# Patient Record
Sex: FEMALE | Race: WHITE | Employment: OTHER | URBAN - METROPOLITAN AREA
[De-identification: names, ages, dates, MRNs, and addresses within clinical notes are randomized per-mention and may not be internally consistent; named-entity substitution may affect disease eponyms.]

---

## 2017-02-07 ENCOUNTER — TELEPHONE (OUTPATIENT)
Dept: NEUROLOGY | Age: 67
End: 2017-02-07

## 2017-02-07 DIAGNOSIS — G35 MULTIPLE SCLEROSIS (HCC): Primary | ICD-10-CM

## 2017-02-07 RX ORDER — DIMETHYL FUMARATE 240 MG/1
240 CAPSULE ORAL 2 TIMES DAILY
Qty: 60 CAP | Refills: 0 | Status: SHIPPED | OUTPATIENT
Start: 2017-02-07 | End: 2017-02-28 | Stop reason: SDUPTHER

## 2017-02-07 NOTE — TELEPHONE ENCOUNTER
Patient has follow up 2/28  Please review script and sign if approved      Fax to [address] [phone]

## 2017-02-07 NOTE — TELEPHONE ENCOUNTER
Pt will need to get labs checked before her visit. Placed those orders. Please print and mail to patient or she can  from office.   Will authorize 1 month Rx Tecfidera

## 2017-02-08 ENCOUNTER — TELEPHONE (OUTPATIENT)
Dept: NEUROLOGY | Age: 67
End: 2017-02-08

## 2017-02-08 NOTE — TELEPHONE ENCOUNTER
Called pharmacy gave refill  Called patient will send labs via mail  Someone will call to get mri scheduled   Patient states understanding

## 2017-02-08 NOTE — TELEPHONE ENCOUNTER
Patient wants to know if she can get her MRI at [facility] since she is closer to there

## 2017-02-21 ENCOUNTER — TELEPHONE (OUTPATIENT)
Dept: NEUROLOGY | Age: 67
End: 2017-02-21

## 2017-02-21 NOTE — TELEPHONE ENCOUNTER
Refaxed MRI order to [address] [place] [address] Coordinator with demographics attached.  Contacted patient and made her aware order was resent, also provided her with the service coordinators phone # [phone]

## 2017-02-28 ENCOUNTER — OFFICE VISIT (OUTPATIENT)
Dept: NEUROLOGY | Age: 67
End: 2017-02-28

## 2017-02-28 ENCOUNTER — DOCUMENTATION ONLY (OUTPATIENT)
Dept: NEUROLOGY | Age: 67
End: 2017-02-28

## 2017-02-28 VITALS
WEIGHT: 144 LBS | BODY MASS INDEX: 27.19 KG/M2 | HEIGHT: 61 IN | OXYGEN SATURATION: 98 % | DIASTOLIC BLOOD PRESSURE: 70 MMHG | HEART RATE: 62 BPM | SYSTOLIC BLOOD PRESSURE: 128 MMHG

## 2017-02-28 DIAGNOSIS — G35 MULTIPLE SCLEROSIS (HCC): Primary | ICD-10-CM

## 2017-02-28 DIAGNOSIS — R20.0 NUMBNESS IN BOTH LEGS: ICD-10-CM

## 2017-02-28 DIAGNOSIS — R25.2 MUSCLE CRAMPING: ICD-10-CM

## 2017-02-28 RX ORDER — BUSPIRONE HYDROCHLORIDE 5 MG/1
TABLET ORAL
Refills: 0 | COMMUNITY
Start: 2016-12-26 | End: 2018-04-10

## 2017-02-28 RX ORDER — LOPERAMIDE HYDROCHLORIDE 2 MG/1
CAPSULE ORAL
Refills: 0 | COMMUNITY
Start: 2016-12-26

## 2017-02-28 RX ORDER — DIMETHYL FUMARATE 240 MG/1
240 CAPSULE ORAL 2 TIMES DAILY
Qty: 60 CAP | Refills: 6 | Status: SHIPPED | OUTPATIENT
Start: 2017-02-28 | End: 2017-10-31 | Stop reason: SDUPTHER

## 2017-02-28 RX ORDER — HYDROCHLOROTHIAZIDE 12.5 MG/1
CAPSULE ORAL
Refills: 5 | COMMUNITY
Start: 2017-02-10 | End: 2020-12-22 | Stop reason: SDUPTHER

## 2017-02-28 RX ORDER — MONTELUKAST SODIUM 10 MG/1
TABLET ORAL
Refills: 3 | COMMUNITY
Start: 2017-02-19 | End: 2020-12-22 | Stop reason: SDUPTHER

## 2017-02-28 RX ORDER — POTASSIUM CHLORIDE 20 MEQ/1
TABLET, EXTENDED RELEASE ORAL
Refills: 0 | COMMUNITY
Start: 2017-02-10 | End: 2018-11-20

## 2017-02-28 RX ORDER — BACLOFEN 10 MG/1
TABLET ORAL
Qty: 40 TAB | Refills: 2 | Status: SHIPPED | OUTPATIENT
Start: 2017-02-28 | End: 2017-03-31 | Stop reason: SDUPTHER

## 2017-02-28 NOTE — PROGRESS NOTES
Interval HPI: This is a 61 y.o. female who is following up for MS    Chief Complaint   Patient presents with    Multiple Sclerosis     having leg cramps in thighs 2-3 times a week     Last visit: August 2016    Describes having frequent muscle cramping in left anterior thigh, can happen either when sitting or walking, seems worse at night, waking her up out of sleep. She recalls that Baclofen helped her calf cramps and lower back spasms in the past.  Otherwise, no interval episodes of vision loss, eye pain, new extremity weakness or numbness, or worsening of gait. Had MRI Brain (+/- contrast) done at [facility] few days ago, but no report or CD available today to review. Also reports having her CBC, CMP done at [facility] about 1 month ago. Continues taking Tecfidera 240 mg BID. Had MRI Brain in March 2016 (+/- contrast), no change in supratentorial lesion burden. Separately, patient asking to discontinue Keppra as she has not had her staring spells in at least 6 years and the episodes she had in the past (\"mini-seizures, spacing-out episodes) were never proven to be true seizures (multiple routine EEGs have been normal). She reports that she was off her keppra for months recently and didn't have any recurrence of staring episodes. Chronic MS symptoms:   gait unsteadiness/ falls  bladder urgency  bowel urgency   spasticity (treated in past with baclofen)  \"mini-seizure\" (episodes where she \"spaces out\", EEG in past normal)    MS Flare Hx:  : Pseudo-exacerbation due to UTI    Labs:  6-25-16: CMP (normal Cr 0.85, Na 141)  7-1-16: WBC 5.2k, abs Lymphocyte 1.4k, % lymphocyte 27.8 (all within normal range)      =====================================  Brief Hx:   61 y.o. female with MS, Hep C, dx with MS around 2530-2725. Initial symptoms was numbness in legs, unsteady gait, speech difficulty.  Recalls having MRI Brain and LP that were abnormal. Tried Rebif, Avonex, Copaxone, and Betaseron in the past, but she's not sure why they were discontinued. She denies having any side effects to any of the injections, she's not sure if they were changed because of disease progression or relapses. She recalls being hospitalized in March of 2014 for an MS relapse (confirmed by MRI Brain, and ? C-spine) and getting a course of IV solumedrol. The last MS med she was started on was Tecfidera and says no problems with that. Prior testing  EEG: normal awake/ drowsy  MRI Brain 3-2016: no significant changes from prior MRI Brain  MRI Brain 5-13-15: moderate white matter lesion burden consistent with Multiple Sclerosis  MRI C-spine 5-13-15: no cord lesions; moderate stenosis at C5-6  MRI T-spine done in 11/ 2013 (outside facility; see media section) didn't show any spinal stenosis or cord lesions  NMO antibodies were negative. Brief ROS: as above  There have been no significant changes in PMHx, PSHx, SHx except as noted above. Allergies   Allergen Reactions    Iodinated Contrast Media - Oral And Iv Dye Anaphylaxis     Current Outpatient Prescriptions   Medication Sig Dispense Refill    busPIRone (BUSPAR) 5 mg tablet TK 1 T PO TID  0    montelukast (SINGULAIR) 10 mg tablet TK 1 T PO  D  3    hydroCHLOROthiazide (MICROZIDE) 12.5 mg capsule TK 1 C PO D  5    loperamide (IMODIUM) 2 mg capsule TK ONE C PO  BID PRN  0    potassium chloride (K-DUR, KLOR-CON) 20 mEq tablet TK 1 T PO  DAILY PRN. TAKE WITH LASIX  0    dimethyl fumarate (TECFIDERA) 240 mg cpDR Take 240 mg by mouth two (2) times a day. 60 Cap 6    baclofen (LIORESAL) 10 mg tablet Can take one tab up to 2 times a day if needed for muscle cramping. May cause fatigue or general muscle weakness. 40 Tab 2    omeprazole (PRILOSEC) 20 mg capsule Take 20 mg by mouth daily.  albuterol (PROAIR HFA) 90 mcg/actuation inhaler Take  by inhalation.       fluticasone-salmeterol (ADVAIR DISKUS) 250-50 mcg/dose diskus inhaler Take 1 Puff by inhalation every twelve (12) hours.  conjugated estrogens (PREMARIN) 0.625 mg tablet Take  by mouth.  metoprolol succinate (TOPROL-XL) 50 mg XL tablet Take  by mouth daily.  amLODIPine (NORVASC) 5 mg tablet Take 5 mg by mouth daily. Physical Exam  Blood pressure 128/70, pulse 62, height 5' 1\" (1.549 m), weight 65.3 kg (144 lb), SpO2 98 %. No acute distress  Neck: no stiffness  CV: regular heart rate  Lungs: clear    Focused Neurological Exam     Mental status: Alert and oriented to person, place situation. Language: normal fluency and comprehension; no dysarthria. CNs:   Visual fields grossly normal  Extraocular movements intact, no nystagmus  Face appears symmetric and facial strength normal.    Hearing is intact to casual conversation. Sensory:  Reduced pinprick in both feet and up slightly above both ankles  Intact temperature in both feet and legs    Motor:   Upper extremities: 5/ 5 bilaterally  Right lower ext: 5/ 5  Left lower ext: 5/ 5     Reflexes:  2+ patellars    Gait: stands independently, ambulates with slight gait unsteadiness    Impression    ICD-10-CM ICD-9-CM    1. Multiple sclerosis (HCC) G35 340    2. Numbness in both legs R20.0 782. 0 EMG LIMITED      NCV SENSORY OR MIXED   3. Muscle cramping R25.2 729.82 baclofen (LIORESAL) 10 mg tablet        61 y.o. yo female with MS, Hepatitis C (seeing GI/ hepatology)    Pt signed BELINDA so I can get most recent MRI Brain report and most recent labs. She will also drop off the MRI CD so I can review the images. Renewed Rx for Tecfidera 240 mg BID. Check NCV both legs, EMG left leg for c/o left leg cramping and finding of sensory loss in both lower legs. Rx'd Baclofen 10 mg up to twice a day prn for muscle cramping (back, calves, and left thigh). Common SEFx discussed. Hx of staring, unresponsive episodes in the remote past, and EEGs have been normal (done at times outside of her spells).   Patient wishes to stop her anti-seizure medication since it's been more than 5 years since a spell. We discussed the pros/ cons of doing so but because she hasn't had a spell in so many years and it's not clear they are/ were true seizures to begin with, she is comfortable with the decision to stop Keppra. Advised her to only take 1 tab at bedtime x 2 weeks, then to stop taking it. She understands that if she has a seizure then she would not be able to drive for 6 month from that point.       Follow up in 6 months

## 2017-02-28 NOTE — MR AVS SNAPSHOT
Visit Information Date & Time Provider Department Dept. Phone Encounter #  
 2/28/2017  9:40 AM Maddison Fleming MD [address] [place] [address] Neurology Clinic [phone] [id number] Follow-up Instructions Return in about 6 months (around 8/28/2017). Upcoming Health Maintenance Date Due Hepatitis C Screening 1950 DTaP/Tdap/Td series (1 - Tdap) 9/19/1973 PAP AKA CERVICAL CYTOLOGY 9/19/1973 BREAST CANCER SCRN MAMMOGRAM 9/19/2002 FOBT Q 1 YEAR AGE 50-75 9/19/2002 ZOSTER VACCINE AGE 60> 9/19/2012 INFLUENZA AGE 9 TO ADULT 8/1/2016 Allergies as of 2/28/2017  Review Complete On: 2/28/2017 By: Gary Mensah LPN Severity Noted Reaction Type Reactions Iodinated Contrast Media - Oral And Iv Dye High 04/23/2015    Anaphylaxis Current Immunizations  Never Reviewed No immunizations on file. Not reviewed this visit You Were Diagnosed With   
  
 Codes Comments Multiple sclerosis ([facility]    -  Primary ICD-10-CM: G35 
ICD-9-CM: 497 Numbness in both legs     ICD-10-CM: R20.0 ICD-9-CM: 782.0 Muscle cramping     ICD-10-CM: R25.2 ICD-9-CM: 729.82 Vitals BP  
  
  
  
  
  
 128/70 (BP 1 Location: Left arm, BP Patient Position: Sitting) BMI and BSA Data Body Mass Index Body Surface Area  
 27.21 kg/m 2 1.68 m 2 Preferred Pharmacy Pharmacy Name Phone [facility] [address] ([phone] Your Updated Medication List  
  
   
This list is accurate as of: 2/28/17 10:10 AM.  Always use your most recent med list.  
  
  
  
  
 [facility] [place] [facility] 250-50 mcg/dose diskus inhaler Generic drug:  fluticasone-salmeterol Take 1 Puff by inhalation every twelve (12) hours. amLODIPine 5 mg tablet Commonly known as:  Jone Alstrom Take 5 mg by mouth daily. baclofen 10 mg tablet Commonly known as:  LIORESAL  
 Can take one tab up to 2 times a day if needed for muscle cramping. May cause fatigue or general muscle weakness. busPIRone 5 mg tablet Commonly known as:  BUSPAR TK 1 T PO TID  
  
 dimethyl fumarate 240 mg Cpdr  
Commonly known as:  Sade Flicker Take 240 mg by mouth two (2) times a day. hydroCHLOROthiazide 12.5 mg capsule Commonly known as:  [place] TK 1 C PO D  
  
 loperamide 2 mg capsule Commonly known as:  IMODIUM TK ONE C PO  BID PRN  
  
 metoprolol succinate 50 mg XL tablet Commonly known as:  TOPROL-XL Take  by mouth daily. montelukast 10 mg tablet Commonly known as:  SINGULAIR TK 1 T PO  D  
  
 omeprazole 20 mg capsule Commonly known as:  PRILOSEC Take 20 mg by mouth daily. potassium chloride 20 mEq tablet Commonly known as:  K-DUR, KLOR-CON TK 1 T PO  DAILY PRN. TAKE WITH LASIX PREMARIN 0.625 mg tablet Generic drug:  conjugated estrogens Take  by mouth. PROAIR HFA 90 mcg/actuation inhaler Generic drug:  albuterol Take  by inhalation. Prescriptions Printed Refills  
 dimethyl fumarate (TECFIDERA) 240 mg cpDR 6 Sig: Take 240 mg by mouth two (2) times a day. Class: Print Route: Oral  
  
Prescriptions Sent to Pharmacy Refills  
 baclofen (LIORESAL) 10 mg tablet 2 Sig: Can take one tab up to 2 times a day if needed for muscle cramping. May cause fatigue or general muscle weakness. Class: Normal  
 Pharmacy: [facility] [address] #: [phone] Follow-up Instructions Return in about 6 months (around 8/28/2017). To-Do List   
 02/28/2017 Neurology:  EMG LIMITED   
  
 02/28/2017 Neurology:  NCV SENSORY OR MIXED Patient Instructions Take Keppra 500 mg at night for 2 weeks, then stop taking it.  
 
Please drop off MRI Brain CD 
 
 Can take Baclofen 10 mg up to twice a day for the thigh cramping Will check nerve test of both legs regarding numbness in feet and cramping in left leg We will get your last MRI Brain report and labwork from [facility] See you back in 6 months Introducing [facility] [place] [facility]! Joann Fu introduces [employer] patient portal. Now you can access parts of your medical record, email your doctor's office, and request medication refills online. 1. In your internet browser, go to [email or web address] 2. Click on the First Time User? Click Here link in the Sign In box. You will see the New Member Sign Up page. 3. Enter your [employer] Access Code exactly as it appears below. You will not need to use this code after youve completed the sign-up process. If you do not sign up before the expiration date, you must request a new code. · [employer] Access Code: [email or web address] Expires: 5/9/2017 10:48 AM 
 
4. Enter the last four digits of your Social Security Number (xxxx) and Date of Birth (mm/dd/yyyy) as indicated and click Submit. You will be taken to the next sign-up page. 5. Create a [employer] ID. This will be your [employer] login ID and cannot be changed, so think of one that is secure and easy to remember. 6. Create a [employer] password. You can change your password at any time. 7. Enter your Password Reset Question and Answer. This can be used at a later time if you forget your password. 8. Enter your e-mail address. You will receive e-mail notification when new information is available in [address] 9. Click Sign Up. You can now view and download portions of your medical record. 10. Click the Download Summary menu link to download a portable copy of your medical information. If you have questions, please visit the Frequently Asked Questions section of the [employer] website. Remember, [employer] is NOT to be used for urgent needs. For medical emergencies, dial 911. Now available from your iPhone and Android! Please provide this summary of care documentation to your next provider. Your primary care clinician is listed as Stephie ARRINGTON. If you have any questions after today's visit, please call [phone].

## 2017-02-28 NOTE — PATIENT INSTRUCTIONS
Take Keppra 500 mg at night for 2 weeks, then stop taking it.     Please drop off MRI Brain CD    Can take Baclofen 10 mg up to twice a day for the thigh cramping    Will check nerve test of both legs regarding numbness in feet and cramping in left leg    We will get your last MRI Brain report and labwork from [address]    See you back in 6 months

## 2017-02-28 NOTE — PROGRESS NOTES
Faxed release of medical records to [facility] records department to have them send MRI report from [facility].

## 2017-03-08 ENCOUNTER — TELEPHONE (OUTPATIENT)
Dept: NEUROLOGY | Age: 67
End: 2017-03-08

## 2017-03-08 NOTE — TELEPHONE ENCOUNTER
Aurora [address] back and spoke with pharmacist Amirah Orr. Provided verbal order for patients tecfidera.

## 2017-03-15 DIAGNOSIS — G35 MULTIPLE SCLEROSIS (HCC): ICD-10-CM

## 2017-03-20 ENCOUNTER — OFFICE VISIT (OUTPATIENT)
Dept: NEUROLOGY | Age: 67
End: 2017-03-20

## 2017-03-20 DIAGNOSIS — M54.16 CHRONIC LEFT LUMBAR RADICULOPATHY: Primary | ICD-10-CM

## 2017-03-20 NOTE — PROCEDURES
EMG/ NCS Report  [facility]  [address]  Ph. [phone]  Test Date:  3/20/2017    Patient: Niurka Pettit : 1950 Physician: Amirah Abdullahi M.D.   ID#: [id number] SEX: Female Ref. Phys: Amirah Abdullahi M.D. Patient History  CC: left lateral leg numbness/ pain, tingling in feet    EMG & NCV Findings:  Evaluation of the Left peroneal motor nerve showed normal distal onset latency (3.9 ms), reduced amplitude (1.5 mV), decreased conduction velocity (B Fib-Ankle, 39 m/s), and normal conduction velocity (Poplt-B Fib, 42 m/s). The Right peroneal motor nerve showed normal distal onset latency (3.9 ms), reduced amplitude (1.1 mV), normal conduction velocity (B Fib-Ankle, 43 m/s), and decreased conduction velocity (Poplt-B Fib, 34 m/s). The Left peroneal (TA) motor and the Right peroneal (TA) motor nerves showed normal distal onset latency (L3.4, R3.4 ms) and normal amplitude (L3.7, R4.3 mV). The Left tibial motor nerve showed normal distal onset latency (4.1 ms), normal amplitude (4.4 mV), and normal conduction velocity (Knee-Ankle, 40 m/s). The Right tibial motor nerve showed normal distal onset latency (3.7 ms), normal amplitude (4.8 mV), and decreased conduction velocity (Knee-Ankle, 38 m/s). The Left median sensory nerve showed normal distal peak latency (3.5 ms), normal amplitude (35.7 µV), and decreased conduction velocity (Wrist-2nd Digit, 46 m/s). The Left radial sensory nerve showed normal distal peak latency (2.3 ms), normal amplitude (11.6 µV), and normal conduction velocity [facility] 1st Digit, 53 m/s). The Left superficial peroneal sensory nerve showed normal distal peak latency (3.7 ms), normal amplitude (8.3 µV), and decreased conduction velocity (Lower leg-Lat ankle, 32 m/s).   The Right superficial peroneal sensory nerve showed normal distal peak latency (2.8 ms), normal amplitude (8.6 µV), and normal conduction velocity (Lower leg-Lat ankle, 40 m/s). The Left sural sensory nerve showed normal distal peak latency (3.7 ms), reduced amplitude (2.0 µV), and normal conduction velocity (Calf-Lat Mall, 44 m/s). The Right sural sensory nerve showed no response (Calf) and no response (Site 2). All F Wave latencies were within normal limits. Needle evaluation of the Left extensor digitorum brevis and the Left posterior tibialis muscles showed increased motor unit amplitude and slightly increased polyphasic potentials. The Left peroneus longus muscle showed diminished recruitment, increased motor unit amplitude, and slightly increased polyphasic potentials. All remaining muscles (as indicated in the following table) showed no evidence of electrical instability. Reduced peroneal motor response at EDBs (low amplitude) but normal at tibialis anterior, consistent with axonal loss. Impression:  Mild chronic left L5 radiculopathy. No electrodiagnostic findings of a symmetric length-dependent peripheral neuropathy, though that does not exclude the possibility of a small fiber neuropathy.   Consider skin-punch biopsy if indicated.      __________________________  Gilbert Ahumada M.D.      Nerve Conduction Studies  Anti Sensory Summary Table     Site NR Peak (ms) Norm Peak (ms) P-T Amp (µV) Norm P-T Amp Site1 Site2 Dist (cm)   Left Median Anti Sensory (2nd Digit)  30.3°C   Wrist    3.5 <3.8 35.7 >10 Wrist 2nd Digit 13.0   Elbow    3.4  37.8  Elbow Wrist 0.0   Left Radial Anti Sensory (Base 1st Digit)  30.5°C   Wrist    2.3 <2.8 11.6 >10 Wrist Base 1st Digit 10.0   Site 2    2.5  5.5       Site 3    2.3  9.5       Left Sup Peron Anti Sensory (Lat ankle)  32°C   Lower leg    3.7 <4.6 8.3 >3 Lower leg Lat ankle 10.0   Site 2    3.3  4.1       Site 3    3.2  6.5       Site 4    3.3  5.5       Right Sup Peron Anti Sensory (Lat ankle)  33.8°C   Lower leg    2.8 <4.6 8.6 >3 Lower leg Lat ankle 10.0   Site 2    2.8  10.8       Site 3    2.8  5.7       Left Sural Anti Sensory (Lat Mall)  31.5°C   Calf    3.7 <4.6 2.0 >3.0 Calf Lat Mall 14.0   Site 2    3.7  0.1       Site 3    3.8  0.5       Right Sural Anti Sensory (Lat Mall)  34.9°C   Calf NR  <4.6  >3.0 Calf Lat Mall 14.0   Site 2 NR            Motor Summary Table     Site NR Onset (ms) Norm Onset (ms) O-P Amp (mV) Norm O-P Amp Site1 Site2 Dist (cm) Jimmie (m/s)   Left Peroneal Motor (Ext Dig Brev)  32°C   Ankle    3.9 <6 1.5 >2.5 Ankle Ext Dig Brev 7.0    B Fib    11.4  1.3  B Fib Ankle 29.0 39   Poplt    13.8  1.2  Poplt B Fib 10.0 42   Right Peroneal Motor (Ext Dig Brev)  33.5°C   Ankle    3.9 <6 1.1 >2.5 Ankle Ext Dig Brev 7.0    B Fib    10.8  1.0  B Fib Ankle 29.5 43   Poplt    13.7  1.0  Poplt B Fib 10.0 34   Left Peroneal TA Motor (Tib Ant)  32°C   Fib Head    3.4 <4.5 3.7 >3.0 Fib Head Poplit 10.0 91   Poplit    4.5  6.3        Right Peroneal TA Motor (Tib Ant)  33°C   Fib Head    3.4 <4.5 4.3 >3.0 Fib Head Poplit 10.0 91   Poplit    4.5  6.6        Left Tibial Motor (Abd Peace Brev)  32°C   Ankle    4.1 <6.0 4.4 >4.0 Ankle Abd Peace Brev 8.0    Knee    13.0  3.6  Knee Ankle 36.0 40   Right Tibial Motor (Abd Peace Brev)  32.8°C   Ankle    3.7 <6.0 4.8 >4.0 Ankle Abd Peace Brev 8.0    Knee    13.4  3.2  Knee Ankle 36.5 38     F Wave Studies     NR F-Lat (ms) Lat Norm (ms) L-R F-Lat (ms) L-R Lat Norm   Left Tibial (Mrkrs) (Abd Hallucis)  31.9°C      53.96 <56 0.00 <5.7   Right Tibial (Mrkrs) (Abd Hallucis)  32.4°C      53.96 <56 0.00 <5.7     H Reflex Studies     NR H-Lat (ms) L-R H-Lat (ms) L-R Lat Norm   Left Tibial (Gastroc)  31.8°C      47.45 0.00 <2.0   Right Tibial (Gastroc)  32.3°C      47.45 0.00 <2.0       EMG     Side Muscle Nerve Root Ins Act Fibs Psw Recrt Duration Amp Poly Comment   Left Ext Dig Brev Dp Br Peron L5, S1 Nml Nml Nml Nml Nml Incr 1+    Left PostTibialis Tibial L5, S1 Nml Nml Nml Nml Nml Incr 1+    Left MedGastroc Tibial S1-2 Nml Nml Nml Nml Nml Nml Nml    Left AntTibialis Dp Br Peron L4-5 Nml Nml Nml Nml Nml Nml Nml    Left GluteusMax InfGluteal L5-S2 Nml Nml Nml Nml Nml Nml Nml    Left GluteusMed SupGluteal L4-S1 Nml Nml Nml Nml Nml Nml Nml    Left Mid Lumb Parasp Rami L4,5 Nml Nml Nml Nml Nml Nml Nml    Left Lower Lumb Parasp Rami L5,S1 Nml Nml Nml Nml Nml Nml Nml    Left Peroneus Long Sup Br Peron L5-S1 Nml Nml Nml Reduced Nml Incr 1+      Waveforms:

## 2017-03-21 PROBLEM — M54.16 CHRONIC LEFT LUMBAR RADICULOPATHY: Status: ACTIVE | Noted: 2017-03-21

## 2017-03-23 ENCOUNTER — TELEPHONE (OUTPATIENT)
Dept: NEUROLOGY | Age: 67
End: 2017-03-23

## 2017-03-23 NOTE — TELEPHONE ENCOUNTER
----- Message from Harish Pardo sent at 3/23/2017  2:42 PM EDT -----  Regarding: Dr. Earl Litten, (P) [phone], was in on 03/20/17 for EMG and was inquiring about the results. Also needed to let staff know that the \"Baclofen 10 mg\" was not working. [facility] in [place]) [phone].

## 2017-03-24 NOTE — TELEPHONE ENCOUNTER
Contacted patient and informed her of her EMG results per Dr. Alma Russell and advised her to [facility] 2 tablets, two to 3 times a day for the muscle cramps. If that dose is helping, then she can call back and I'll send her in a new prescription\". Mrs. Lexus Rodrigez voiced understanding and will call back if any further questions or concerns.

## 2017-03-31 ENCOUNTER — TELEPHONE (OUTPATIENT)
Dept: NEUROLOGY | Age: 67
End: 2017-03-31

## 2017-03-31 DIAGNOSIS — R25.2 MUSCLE CRAMPING: ICD-10-CM

## 2017-03-31 DIAGNOSIS — G35 MULTIPLE SCLEROSIS (HCC): Primary | ICD-10-CM

## 2017-03-31 RX ORDER — BACLOFEN 20 MG/1
TABLET ORAL
Qty: 90 TAB | Refills: 1 | Status: SHIPPED | OUTPATIENT
Start: 2017-03-31 | End: 2017-05-18 | Stop reason: SDUPTHER

## 2017-03-31 NOTE — TELEPHONE ENCOUNTER
Contacted patient back and informed her the pinched nerve was at the left L5. Also informed her the baclofen was sent to her pharmacy. Patient voiced understanding and will call back if any further questions or concerns.

## 2017-03-31 NOTE — TELEPHONE ENCOUNTER
----- Message from Shilo Law sent at 3/31/2017 12:01 PM EDT -----  Regarding: Dr. Efrain Guerrero  Pt stated she was told her \"Bacaflin\" was supposed to be doubled. She stated she only has a 10 day supply if she doubles it, she is requesting her medication to be called into the pharmacy because she won't have enough. Pt stated she also wants to know where her pinced nerve is. Best contact [phone].

## 2017-04-03 ENCOUNTER — TELEPHONE (OUTPATIENT)
Dept: NEUROLOGY | Age: 67
End: 2017-04-03

## 2017-04-03 NOTE — TELEPHONE ENCOUNTER
----- Message from [facility] sent at 3/31/2017  2:59 PM EDT -----  Regarding: /Refill  Pt was told her \"Bacaflin\" was supposed to be doubled. Pt only has a 10 day supply if she doubles it, pt requesting her medication be called into the pharmacy because she will not have enough. Pt also wants to know where her pinched nerve is. Best contact [phone].

## 2017-06-27 ENCOUNTER — OFFICE VISIT (OUTPATIENT)
Dept: NEUROLOGY | Age: 67
End: 2017-06-27

## 2017-06-27 VITALS
HEART RATE: 97 BPM | WEIGHT: 144 LBS | DIASTOLIC BLOOD PRESSURE: 80 MMHG | SYSTOLIC BLOOD PRESSURE: 138 MMHG | BODY MASS INDEX: 27.19 KG/M2 | HEIGHT: 61 IN | OXYGEN SATURATION: 63 %

## 2017-06-27 DIAGNOSIS — G35 MULTIPLE SCLEROSIS (HCC): Primary | ICD-10-CM

## 2017-06-27 DIAGNOSIS — M62.838 MUSCLE SPASM: ICD-10-CM

## 2017-06-27 RX ORDER — CHOLESTYRAMINE 4 G/9G
POWDER, FOR SUSPENSION ORAL
Refills: 0 | COMMUNITY
Start: 2017-06-12 | End: 2018-11-20

## 2017-06-27 RX ORDER — CLONAZEPAM 0.25 MG/1
0.25 TABLET, ORALLY DISINTEGRATING ORAL
Qty: 30 TAB | Refills: 0 | Status: SHIPPED | OUTPATIENT
Start: 2017-06-27 | End: 2017-07-24 | Stop reason: SDUPTHER

## 2017-06-27 NOTE — PROGRESS NOTES
Patient is here to discuss issues shes been experiencing related to her MS. Shes been on tecfidera for 2 years. Patient states for the past 5-6 days her stomach has been hurting bad. She states she saw in her MS magazine the ad for Tecfidera, and it had side effects listed. She is curious whether the stomach issues are stemming from the [address] since it is listed as a side effect. Another issue she is having are muscle spasms. She states the spasms start from her neck to her tail bone. She also gets them in her legs but she is \"able to walk them out\".

## 2017-06-27 NOTE — MR AVS SNAPSHOT
Visit Information Date & Time Provider Department Dept. Phone Encounter #  
 6/27/2017 11:40 AM Dimitry Koenig [place] [address] [facility] [phone] [id number] Your Appointments 10/24/2017 11:40 AM  
Follow Up with Ritika Campos MD  
[address] [facility] [address]) Appt Note: follow up MS; follow up [facility] [phone] [address]  
Rossiter Luis [address] [address] Upcoming Health Maintenance Date Due Hepatitis C Screening 1950 DTaP/Tdap/Td series (1 - Tdap) 9/19/1973 PAP AKA CERVICAL CYTOLOGY 9/19/1973 BREAST CANCER SCRN MAMMOGRAM 9/19/2002 FOBT Q 1 YEAR AGE 50-75 9/19/2002 ZOSTER VACCINE AGE 60> 9/19/2012 INFLUENZA AGE 9 TO ADULT 8/1/2017 Allergies as of 6/27/2017  Review Complete On: 6/27/2017 By: Josie Wyatt LPN Severity Noted Reaction Type Reactions Iodinated Contrast- Oral And Iv Dye High 04/23/2015    Anaphylaxis Current Immunizations  Never Reviewed No immunizations on file. Not reviewed this visit You Were Diagnosed With   
  
 Codes Comments [facility]    -  Primary ICD-10-CM: G35 
ICD-9-CM: 537 Muscle spasm     ICD-10-CM: X10.643 ICD-9-CM: 728.85 Vitals BP Pulse Height(growth percentile) Weight(growth percentile) SpO2 BMI  
 138/80 (BP 1 Location: Left arm, BP Patient Position: Sitting) 97 5' 1\" (1.549 m) 144 lb (65.3 kg) (!) 63% 27.21 kg/m2 OB Status Smoking Status Hysterectomy Never Smoker BMI and BSA Data Body Mass Index Body Surface Area  
 27.21 kg/m 2 1.68 m 2 Preferred Pharmacy Pharmacy Name Phone [facility] [address], [address] [facility] AT [address] [phone] Your Updated Medication List  
  
   
 This list is accurate as of: 6/27/17 12:42 PM.  Always use your most recent med list.  
  
  
  
  
 Indira Gaster 250-50 mcg/dose diskus inhaler Generic drug:  fluticasone-salmeterol Take 1 Puff by inhalation every twelve (12) hours. amLODIPine 5 mg tablet Commonly known as:  Christiano Cordial Take 5 mg by mouth daily. busPIRone 5 mg tablet Commonly known as:  BUSPAR TK 1 T PO TID  
  
 cholestyramine-sucrose 4 gram powder MIX AND DRINK 4 GRAMS PO BID  
  
 clonazePAM 0.25 mg disintegrating tablet Commonly known as:  Thelma Payer Take 1 Tab by mouth nightly. Max Daily Amount: 0.25 mg. For muscle spasms. dimethyl fumarate 240 mg Cpdr  
Commonly known as:  Dexter Loots Take 240 mg by mouth two (2) times a day. hydroCHLOROthiazide 12.5 mg capsule Commonly known as:  Gustavo Speaks TK 1 C PO D  
  
 loperamide 2 mg capsule Commonly known as:  IMODIUM TK ONE C PO  BID PRN  
  
 metoprolol succinate 50 mg XL tablet Commonly known as:  TOPROL-XL Take  by mouth daily. montelukast 10 mg tablet Commonly known as:  SINGULAIR TK 1 T PO  D  
  
 omeprazole 20 mg capsule Commonly known as:  PRILOSEC Take 20 mg by mouth daily. potassium chloride 20 mEq tablet Commonly known as:  K-DUR, KLOR-CON TK 1 T PO  DAILY PRN. TAKE WITH LASIX PREMARIN 0.625 mg tablet Generic drug:  conjugated estrogens Take  by mouth. PROAIR HFA 90 mcg/actuation inhaler Generic drug:  albuterol Take  by inhalation. SPIRIVA WITH HANDIHALER 18 mcg inhalation capsule Generic drug:  tiotropium Take 1 Cap by inhalation daily. Prescriptions Printed Refills  
 clonazePAM (KLONOPIN) 0.25 mg disintegrating tablet 0 Sig: Take 1 Tab by mouth nightly. Max Daily Amount: 0.25 mg. For muscle spasms. Class: Print Route: Oral  
  
We Performed the Following CBC WITH AUTOMATED DIFF [49843 CPT(R)] METABOLIC PANEL, COMPREHENSIVE [15389 CPT(R)] Introducing [facility]! Jae Jose introduces [employer] patient portal. Now you can access parts of your medical record, email your doctor's office, and request medication refills online. 1. In your internet browser, go to [email or web address] 2. Click on the First Time User? Click Here link in the Sign In box. You will see the New Member Sign Up page. 3. Enter your [employer] Access Code exactly as it appears below. You will not need to use this code after youve completed the sign-up process. If you do not sign up before the expiration date, you must request a new code. · [employer] Access Code: [email or web address] Expires: 9/25/2017 12:15 PM 
 
4. Enter the last four digits of your Social Security Number (xxxx) and Date of Birth (mm/dd/yyyy) as indicated and click Submit. You will be taken to the next sign-up page. 5. Create a [employer] ID. This will be your [employer] login ID and cannot be changed, so think of one that is secure and easy to remember. 6. Create a [employer] password. You can change your password at any time. 7. Enter your Password Reset Question and Answer. This can be used at a later time if you forget your password. 8. Enter your e-mail address. You will receive e-mail notification when new information is available in [address] 9. Click Sign Up. You can now view and download portions of your medical record. 10. Click the Download Summary menu link to download a portable copy of your medical information. If you have questions, please visit the Frequently Asked Questions section of the [employer] website. Remember, [employer] is NOT to be used for urgent needs. For medical emergencies, dial 911. Now available from your iPhone and Android! Please provide this summary of care documentation to your next provider. Your primary care clinician is listed as Na Nagy. If you have any questions after today's visit, please call [phone].

## 2017-06-27 NOTE — PROGRESS NOTES
Interval HPI: This is a 61 y.o. female who is following up for MS    Chief Complaint   Patient presents with    Multiple Sclerosis     Interval Hx    Having abdominal pain x 5-6 days has plans to see GI about that. Had EMG (left leg) for c/o left lateral leg numbness, tingling. Shows a mild, chronic left L5 radiculopathy. No electrodiagnostic evidence of typical length-dependent peripheral neuropathy, thought that doesn't exclude possibility of small fiber neuropathy. Consider skin-punch biopsy if indicated. Still having frequent muscle cramping, not alleviated by Baclofen. Doesn't recall trying other muscle relaxers (flexeril or zanaflex) or clonazepam.    Continues taking Tecfidera 240 mg BID. No interval episodes of vision loss, eye pain, new extremity weakness or numbness, or worsening of gait. Reviewed report of MRI Brain (+/- contrast) done at [facility] on 2-23-17. They didn't have a prior MRI to compare it to (compared to a prior CT head) but report indicates scattered areas of T2 hyperintensity consistent with hx of MS, no abnormal enhancement. Requested labs done at [facility] a few months back but never got any lab results. Chronic MS symptoms:   gait unsteadiness/ falls  bladder urgency  bowel urgency   spasticity (treated in past with baclofen)  \"mini-seizure\" (episodes where she \"spaces out\", EEG in past normal, Keppra d/c'd, no recent spells)    MS Flare Hx:  : Pseudo-exacerbation due to UTI    Labs:  6-25-16: CMP (normal Cr 0.85, Na 141)  7-1-16: WBC 5.2k, abs Lymphocyte 1.4k, % lymphocyte 27.8 (all within normal range)      =====================================  Brief Hx:   61 y.o. female with MS, Hep C, dx with MS around 9696-1366. Initial symptoms was numbness in legs, unsteady gait, speech difficulty. Recalls having MRI Brain and LP that were abnormal. Tried Rebif, Avonex, Copaxone, and Betaseron in the past, but she's not sure why they were discontinued.  She denies having any side effects to any of the injections, she's not sure if they were changed because of disease progression or relapses. She recalls being hospitalized in March of 2014 for an MS relapse (confirmed by MRI Brain, and ? C-spine) and getting a course of IV solumedrol. The last MS med she was started on was Tecfidera and says no problems with that. Prior testing  EEG: normal awake/ drowsy  MRI Brain 3-2016: no significant changes from prior MRI Brain  MRI Brain 5-13-15: moderate white matter lesion burden consistent with Multiple Sclerosis  MRI C-spine 5-13-15: no cord lesions; moderate stenosis at C5-6  MRI T-spine done in 11/ 2013 (outside facility; see media section) didn't show any spinal stenosis or cord lesions  NMO antibodies were negative. Brief ROS: as above  There have been no significant changes in PMHx, PSHx, SHx except as noted above. Allergies   Allergen Reactions    Iodinated Contrast- Oral And Iv Dye Anaphylaxis     Current Outpatient Prescriptions   Medication Sig Dispense Refill    cholestyramine-sucrose 4 gram powder MIX AND DRINK 4 GRAMS PO BID  0    tiotropium (SPIRIVA WITH HANDIHALER) 18 mcg inhalation capsule Take 1 Cap by inhalation daily.  clonazePAM (KLONOPIN) 0.25 mg disintegrating tablet Take 1 Tab by mouth nightly. Max Daily Amount: 0.25 mg. For muscle spasms. 30 Tab 0    busPIRone (BUSPAR) 5 mg tablet TK 1 T PO TID  0    montelukast (SINGULAIR) 10 mg tablet TK 1 T PO  D  3    hydroCHLOROthiazide (MICROZIDE) 12.5 mg capsule TK 1 C PO D  5    loperamide (IMODIUM) 2 mg capsule TK ONE C PO  BID PRN  0    potassium chloride (K-DUR, KLOR-CON) 20 mEq tablet TK 1 T PO  DAILY PRN. TAKE WITH LASIX  0    dimethyl fumarate (TECFIDERA) 240 mg cpDR Take 240 mg by mouth two (2) times a day. 60 Cap 6    omeprazole (PRILOSEC) 20 mg capsule Take 20 mg by mouth daily.  albuterol (PROAIR HFA) 90 mcg/actuation inhaler Take  by inhalation.       fluticasone-salmeterol (ADVAIR DISKUS) 250-50 mcg/dose diskus inhaler Take 1 Puff by inhalation every twelve (12) hours.  conjugated estrogens (PREMARIN) 0.625 mg tablet Take  by mouth.  metoprolol succinate (TOPROL-XL) 50 mg XL tablet Take  by mouth daily.  amLODIPine (NORVASC) 5 mg tablet Take 5 mg by mouth daily. Physical Exam  Blood pressure 138/80, pulse 97, height 5' 1\" (1.549 m), weight 65.3 kg (144 lb), SpO2 (!) 63 %. No acute distress  Neck: no stiffness  CV: regular heart rate  Lungs: clear    Focused Neurological Exam     Mental status: Alert and oriented to person, place situation. Language: normal fluency and comprehension; no dysarthria. CNs:   Visual fields grossly normal  Extraocular movements intact, no nystagmus  Face appears symmetric and facial strength normal.    Hearing is intact to casual conversation. Sensory: Mild reduced pinprick in both feet, intact in lower legs  Motor: 5/ 5 in all extremities    Reflexes:  2+ patellars    Gait: slight unsteadiness    Impression    ICD-10-CM ICD-9-CM    1. Multiple sclerosis (HCC) G35 340 CBC WITH AUTOMATED DIFF      METABOLIC PANEL, COMPREHENSIVE      clonazePAM (KLONOPIN) 0.25 mg disintegrating tablet   2. Muscle spasm M62.838 728. 85 clonazePAM (KLONOPIN) 0.25 mg disintegrating tablet        61 y.o. yo female with MS, Hepatitis C (seeing GI/ hepatology)    1) Multiple Sclerosis  No recent MS exacerbations; continue on Tecfidera one cap BID. Most recent Brain MRI done about 4 months ago didn't show any active lesions. Hard to comment about new lesions as pt had it done at a different facility, and no comparison. Asked her to get scans [facility] at one [facility] in the future so I and Radiology can compare to prior studies to see if any new lesions. Given lab slips to check CBC, CMP as haven't gotten any lab results from [facility] despite release of information at last visit.       2) Muscle cramping  D/c baclofen. Try clonazepam 0.25 mg QHS prn muscle cramps. Given 1 month Rx. Pt to call back if it's helping and she wants a refill.     3) Follow-up in 4 months

## 2017-07-24 DIAGNOSIS — M62.838 MUSCLE SPASM: ICD-10-CM

## 2017-07-24 DIAGNOSIS — G35 MULTIPLE SCLEROSIS (HCC): ICD-10-CM

## 2017-07-24 RX ORDER — CLONAZEPAM 0.25 MG/1
0.25 TABLET, ORALLY DISINTEGRATING ORAL
Qty: 30 TAB | Refills: 2 | Status: SHIPPED | OUTPATIENT
Start: 2017-07-24 | End: 2017-10-24 | Stop reason: SDUPTHER

## 2017-07-24 NOTE — TELEPHONE ENCOUNTER
Requested Prescriptions     Pending Prescriptions Disp Refills    clonazePAM (KLONOPIN) 0.25 mg disintegrating tablet 30 Tab 0     Sig: Take 1 Tab by mouth nightly. Max Daily Amount: 0.25 mg. For muscle spasms.

## 2017-10-24 ENCOUNTER — OFFICE VISIT (OUTPATIENT)
Dept: NEUROLOGY | Age: 67
End: 2017-10-24

## 2017-10-24 VITALS
OXYGEN SATURATION: 96 % | BODY MASS INDEX: 27.19 KG/M2 | DIASTOLIC BLOOD PRESSURE: 78 MMHG | HEART RATE: 69 BPM | WEIGHT: 144 LBS | SYSTOLIC BLOOD PRESSURE: 134 MMHG | HEIGHT: 61 IN

## 2017-10-24 DIAGNOSIS — M62.838 MUSCLE SPASM: ICD-10-CM

## 2017-10-24 DIAGNOSIS — G35 MULTIPLE SCLEROSIS (HCC): Primary | ICD-10-CM

## 2017-10-24 RX ORDER — CLONAZEPAM 0.25 MG/1
0.25 TABLET, ORALLY DISINTEGRATING ORAL
Qty: 30 TAB | Refills: 0 | Status: SHIPPED | OUTPATIENT
Start: 2017-10-24 | End: 2017-11-21 | Stop reason: SDUPTHER

## 2017-10-24 NOTE — PROGRESS NOTES
Interval HPI: This is a 74 y.o. female who is following up for MS    Chief Complaint   Patient presents with    Multiple Sclerosis     Interval Hx    Last visit was c/o frequent muscle cramping in left leg. Baclofen didn't help. Tried Clonazepam 0.25 mg QHS and she says not as much cramping in legs (was left >> right). Today she reports having chronic intermittent spasms from top of spine to bottom, happens suddenly, takes her breath away, describes it as something like severe tightness or maybe electric shock, not related to movement or turning her neck in any direction. She recalls trying Flexeril in the past, not sure if it helped. Never tried Zanaflex or Robaxin. She is planning on moving to [facility] in the next 2 weeks. Continues taking Tecfidera 240 mg BID. No interval episodes of vision loss, eye pain, new extremity weakness or numbness, or worsening of gait. She signed BELINDA at last visit so I could look at most recent lab work from [facility] but didn't get any records. Chronic MS symptoms:   gait unsteadiness/ falls  bladder urgency  bowel urgency   spasticity (treated in past with baclofen)  \"mini-seizure\" (episodes where she \"spaces out\", EEG in past normal, Keppra d/c'd, no recent spells)    MS Flare Hx:  : Pseudo-exacerbation due to UTI    Labs (last reviewed):  6-25-16: CMP (normal Cr 0.85, Na 141)  7-1-16: WBC 5.2k, abs Lymphocyte 1.4k, % lymphocyte 27.8 (all within normal range)      =====================================  Brief Hx:   74 y.o. female with MS, Hep C, dx with MS around 7929-1497 while living in [facility]. Initial symptoms was numbness in legs, unsteady gait, speech difficulty. Recalls having MRI Brain and LP that were abnormal. Tried Rebif, Avonex, Copaxone, and Betaseron in the past, but she's not sure why they were discontinued.  She denies having any side effects to any of the injections, she's not sure if they were changed because of disease progression or relapses. She recalls being hospitalized in March of 2014 for an MS relapse (confirmed by MRI Brain, and ? C-spine) and getting a course of IV solumedrol. The last MS med she was started on was Tecfidera and says no problems with that. Prior testing  EEG: normal awake/ drowsy. MRI Brain 3-2016: no significant changes from prior MRI Brain. MRI Brain 5-13-15: moderate white matter lesion burden consistent with Multiple Sclerosis. MRI C-spine 5-13-15: no cord lesions; moderate stenosis at C5-6. MRI T-spine done in 11/ 2013 (outside facility; see media section) didn't show any spinal stenosis or cord lesions. NMO antibodies were negative. MRI Brain (+/- contrast) done at [facility] on 2-23-17. They didn't have a prior MRI to compare it to (compared to a prior CT head) but report indicates scattered areas of T2 hyperintensity consistent with hx of MS, no abnormal enhancement. EMG (left leg) for c/o left lateral leg numbness, tingling. Shows a mild, chronic left L5 radiculopathy. No electrodiagnostic evidence of typical length-dependent peripheral neuropathy, thought that doesn't exclude possibility of small fiber neuropathy. Consider skin-punch biopsy if indicated. Brief ROS: as above  There have been no significant changes in PMHx, PSHx, SHx except as noted above. Allergies   Allergen Reactions    Iodinated Contrast- Oral And Iv Dye Anaphylaxis     Current Outpatient Prescriptions   Medication Sig Dispense Refill    clonazePAM (KLONOPIN) 0.25 mg disintegrating tablet Take 1 Tab by mouth nightly. Max Daily Amount: 0.25 mg. For MS related muscle spasm 30 Tab 0    tiotropium (SPIRIVA WITH HANDIHALER) 18 mcg inhalation capsule Take 1 Cap by inhalation daily.       busPIRone (BUSPAR) 5 mg tablet TK 1 T PO TID  0    montelukast (SINGULAIR) 10 mg tablet TK 1 T PO  D  3    hydroCHLOROthiazide (MICROZIDE) 12.5 mg capsule TK 1 C PO D  5    loperamide (IMODIUM) 2 mg capsule TK ONE C PO  BID PRN  0    potassium chloride (K-DUR, KLOR-CON) 20 mEq tablet TK 1 T PO  DAILY PRN. TAKE WITH LASIX  0    dimethyl fumarate (TECFIDERA) 240 mg cpDR Take 240 mg by mouth two (2) times a day. 60 Cap 6    omeprazole (PRILOSEC) 20 mg capsule Take 20 mg by mouth daily.  albuterol (PROAIR HFA) 90 mcg/actuation inhaler Take  by inhalation.  fluticasone-salmeterol (ADVAIR DISKUS) 250-50 mcg/dose diskus inhaler Take 1 Puff by inhalation every twelve (12) hours.  metoprolol succinate (TOPROL-XL) 50 mg XL tablet Take  by mouth daily.  amLODIPine (NORVASC) 5 mg tablet Take 5 mg by mouth daily.  cholestyramine-sucrose 4 gram powder MIX AND DRINK 4 GRAMS PO BID  0    conjugated estrogens (PREMARIN) 0.625 mg tablet Take  by mouth. Physical Exam  Blood pressure 134/78, pulse 69, height 5' 1\" (1.549 m), weight 65.3 kg (144 lb), SpO2 96 %. No acute distress  Neck: no stiffness  No shooting pain down spine with neck flexion/ extension/ facet loading to either side    CV: regular heart rate  Lungs: clear    Focused Neurological Exam     Mental status: Alert and oriented to person, place situation. Language: normal fluency and comprehension; no dysarthria. CNs:   Visual fields grossly normal  Extraocular movements intact, no nystagmus  Face appears symmetric and facial strength normal.    Hearing is intact to casual conversation. Sensory: intact LT in both legs  Motor: 5/ 5 in all extremities    Reflexes:  2+ patellars    Gait: slight unsteadiness    Impression    ICD-10-CM ICD-9-CM    1. Multiple sclerosis (HCC) G35 340 clonazePAM (KLONOPIN) 0.25 mg disintegrating tablet   2. Muscle spasm M62.838 728. 85 clonazePAM (KLONOPIN) 0.25 mg disintegrating tablet        74 y.o. yo female with MS, Hepatitis C (followed by GI/ hepatology)    1) Multiple Sclerosis  No recent exacerbations; continue on Tecfidera one cap BID.      Pt to bring copy of lab results so I can addend this clinic note with those results  She will also sign BELINDA at that time to get her neurology notes for her next provider    2) Muscle cramping  Continue Clonazepam 0.25 mg QHS for left leg cramping. Didn't increase dose today as pt moving soon. The pain shooting down her spine may be MS related and her next Neurologist can consider how to address that (possibly increase clonazepam if they think that's appropriate). 3) No follow up as pt moving.

## 2017-10-24 NOTE — MR AVS SNAPSHOT
Visit Information Date & Time Provider Department Dept. Phone Encounter #  
 10/24/2017 11:40 AM Gilbert Ahumada, [address] [facility] [phone] [id number] Follow-up Instructions Return if symptoms worsen or fail to improve. Upcoming Health Maintenance Date Due Hepatitis C Screening 1950 DTaP/Tdap/Td series (1 - Tdap) 9/19/1973 BREAST CANCER SCRN MAMMOGRAM 9/19/2002 FOBT Q 1 YEAR AGE 50-75 9/19/2002 ZOSTER VACCINE AGE 60> 7/19/2012 INFLUENZA AGE 9 TO ADULT 8/1/2017 GLAUCOMA SCREENING Q2Y 9/19/2017 OSTEOPOROSIS SCREENING (DEXA) 9/19/2017 Pneumococcal 65+ Low/Medium Risk (1 of 2 - PCV13) 9/19/2017 MEDICARE YEARLY EXAM 9/19/2017 Allergies as of 10/24/2017  Review Complete On: 10/24/2017 By: Manjula Magallanes LPN Severity Noted Reaction Type Reactions Iodinated Contrast- Oral And Iv Dye High 04/23/2015    Anaphylaxis Current Immunizations  Never Reviewed No immunizations on file. Not reviewed this visit You Were Diagnosed With   
  
 Codes Comments Multiple sclerosis ([facility] [address]    -  Primary ICD-10-CM: G35 
ICD-9-CM: 088 Muscle spasm     ICD-10-CM: I08.949 ICD-9-CM: 728.85 Vitals BP Pulse Height(growth percentile) Weight(growth percentile) SpO2 BMI  
 134/78 (BP 1 Location: Left arm, BP Patient Position: Sitting) 69 5' 1\" (1.549 m) 144 lb (65.3 kg) 96% 27.21 kg/m2 OB Status Smoking Status Hysterectomy Never Smoker BMI and BSA Data Body Mass Index Body Surface Area  
 27.21 kg/m 2 1.68 m 2 Preferred Pharmacy Pharmacy Name Phone [facility] [address], [address] [facility] Kristen Hankins AT [address] [phone] Your Updated Medication List  
  
   
This list is accurate as of: 10/24/17 12:05 PM.  Always use your most recent med list.  
  
  
  
  
 Luis Antonio Hoyles 250-50 mcg/dose diskus inhaler Generic drug:  fluticasone-salmeterol Take 1 Puff by inhalation every twelve (12) hours. amLODIPine 5 mg tablet Commonly known as:  Alejandra Pace Take 5 mg by mouth daily. busPIRone 5 mg tablet Commonly known as:  BUSPAR TK 1 T PO TID  
  
 cholestyramine-sucrose 4 gram powder MIX AND DRINK 4 GRAMS PO BID  
  
 clonazePAM 0.25 mg disintegrating tablet Commonly known as:  Namita Hunter Take 1 Tab by mouth nightly. Max Daily Amount: 0.25 mg. For MS related muscle spasm  
  
 dimethyl fumarate 240 mg Cpdr  
Commonly known as:  Duke Mote Take 240 mg by mouth two (2) times a day. hydroCHLOROthiazide 12.5 mg capsule Commonly known as:  Dominga Sensor TK 1 C PO D  
  
 loperamide 2 mg capsule Commonly known as:  IMODIUM TK ONE C PO  BID PRN  
  
 metoprolol succinate 50 mg XL tablet Commonly known as:  TOPROL-XL Take  by mouth daily. montelukast 10 mg tablet Commonly known as:  SINGULAIR TK 1 T PO  D  
  
 omeprazole 20 mg capsule Commonly known as:  PRILOSEC Take 20 mg by mouth daily. potassium chloride 20 mEq tablet Commonly known as:  K-DUR, KLOR-CON TK 1 T PO  DAILY PRN. TAKE WITH LASIX PREMARIN 0.625 mg tablet Generic drug:  conjugated estrogens Take  by mouth. PROAIR HFA 90 mcg/actuation inhaler Generic drug:  albuterol Take  by inhalation. SPIRIVA WITH HANDIHALER 18 mcg inhalation capsule Generic drug:  tiotropium Take 1 Cap by inhalation daily. Prescriptions Printed Refills  
 clonazePAM (KLONOPIN) 0.25 mg disintegrating tablet 0 Sig: Take 1 Tab by mouth nightly. Max Daily Amount: 0.25 mg. For MS related muscle spasm Class: Print Route: Oral  
  
Follow-up Instructions Return if symptoms worsen or fail to improve. Patient Instructions 1. Get a copy of your last labs from [address] for both your own records, and drop me off a copy so I can enter it your clinic note. 2. Stop by neurology office on 10-31-17 to  your neurology notes to take to your next neurologist.   
 
3. Good luck in [facility] Introducing [facility]! Katrin Rudolph introduces [employer] patient portal. Now you can access parts of your medical record, email your doctor's office, and request medication refills online. 1. In your internet browser, go to [email or web address]. [email or web address] 2. Click on the First Time User? Click Here link in the Sign In box. You will see the New Member Sign Up page. 3. Enter your [employer] Access Code exactly as it appears below. You will not need to use this code after youve completed the sign-up process. If you do not sign up before the expiration date, you must request a new code. · [employer] Access Code: [email or web address] Expires: 1/22/2018 11:40 AM 
 
4. Enter the last four digits of your Social Security Number (xxxx) and Date of Birth (mm/dd/yyyy) as indicated and click Submit. You will be taken to the next sign-up page. 5. Create a [employer] ID. This will be your [employer] login ID and cannot be changed, so think of one that is secure and easy to remember. 6. Create a [employer] password. You can change your password at any time. 7. Enter your Password Reset Question and Answer. This can be used at a later time if you forget your password. 8. Enter your e-mail address. You will receive e-mail notification when new information is available in [address] 9. Click Sign Up. You can now view and download portions of your medical record. 10. Click the Download Summary menu link to download a portable copy of your medical information. If you have questions, please visit the Frequently Asked Questions section of the [employer] website. Remember, [employer] is NOT to be used for urgent needs. For medical emergencies, dial 911. Now available from your iPhone and Android! Please provide this summary of care documentation to your next provider. Your primary care clinician is listed as Miguel Ruiz. If you have any questions after today's visit, please call [phone].

## 2017-10-24 NOTE — PATIENT INSTRUCTIONS
1. Get a copy of your last labs from [facility] for both your own records, and drop me off a copy so I can enter it your clinic note.     2. Stop by neurology office on 10-31-17 to  your neurology notes to take to your next neurologist.      3. Good luck in [facility]

## 2017-10-31 RX ORDER — DIMETHYL FUMARATE 240 MG/1
240 CAPSULE ORAL 2 TIMES DAILY
Qty: 60 CAP | Refills: 2 | Status: SHIPPED | OUTPATIENT
Start: 2017-10-31 | End: 2018-04-26 | Stop reason: SDUPTHER

## 2017-10-31 NOTE — TELEPHONE ENCOUNTER
Requested Prescriptions     Pending Prescriptions Disp Refills    dimethyl fumarate (TECFIDERA) 240 mg cpDR 60 Cap 6     Sig: Take 240 mg by mouth two (2) times a day.

## 2017-11-10 RX ORDER — BACLOFEN 10 MG/1
TABLET ORAL
Refills: 2 | COMMUNITY
Start: 2017-10-19 | End: 2017-11-21

## 2017-11-10 NOTE — TELEPHONE ENCOUNTER
Requested Prescriptions     Pending Prescriptions Disp Refills    baclofen (LIORESAL) 10 mg tablet 40 Tab 0     Sig: TK 1 TAB UP TO BID PRN MUSCLE CRAMPING . Rutledge Junk MAY CAUSE FATIGUE OR GENERAL MUSCLE WEAKNESS

## 2017-11-13 RX ORDER — BACLOFEN 10 MG/1
TABLET ORAL
Qty: 40 TAB | Refills: 0 | OUTPATIENT
Start: 2017-11-13

## 2017-11-17 ENCOUNTER — TELEPHONE (OUTPATIENT)
Dept: NEUROLOGY | Age: 67
End: 2017-11-17

## 2017-11-20 ENCOUNTER — TELEPHONE (OUTPATIENT)
Dept: NEUROLOGY | Age: 67
End: 2017-11-20

## 2017-11-20 NOTE — TELEPHONE ENCOUNTER
----- Message from Noemí Bustos sent at 11/20/2017  2:40 PM EST -----  Regarding: Dr. Ramu Rivera: [phone]  Pt is requesting that the office call [facility] to obtain test results. Pt also stated that she is not moving, and would like medication \"Zaxaphram\" refills to start at [facility] in [facility] ( [phone]) . The best contact number for the pt is [phone].

## 2017-11-21 DIAGNOSIS — G35 MULTIPLE SCLEROSIS (HCC): ICD-10-CM

## 2017-11-21 DIAGNOSIS — M62.838 MUSCLE SPASM: ICD-10-CM

## 2017-11-21 RX ORDER — CLONAZEPAM 0.5 MG/1
0.5 TABLET, ORALLY DISINTEGRATING ORAL
Qty: 30 TAB | Refills: 0 | Status: SHIPPED | OUTPATIENT
Start: 2017-11-21 | End: 2018-01-16

## 2017-11-21 NOTE — TELEPHONE ENCOUNTER
Contacted patient. She states she is no longer moving to [facility]. She needs a refill on baclofen sent to her new pharmacy in [facility]. She stated she thinks you all discussed increasing the dose. Please advise.

## 2017-11-21 NOTE — TELEPHONE ENCOUNTER
She had tried Baclofen but said it didn't help so I had given her the clonazepam instead. We may have talked about increasing that but didn't because she was moving. Does she want to increase it now?

## 2017-11-21 NOTE — TELEPHONE ENCOUNTER
Increased dose to 0.5 mg, printed Rx (you may need to reprint). Will need to see her within 30 days to for further renewals.

## 2017-11-22 ENCOUNTER — TELEPHONE (OUTPATIENT)
Dept: NEUROLOGY | Age: 67
End: 2017-11-22

## 2017-11-22 NOTE — TELEPHONE ENCOUNTER
Faxed [email or web address] order to pharmacy. Attempted to contact patient by phone. No answer, left call back number on voice mail.

## 2017-11-22 NOTE — TELEPHONE ENCOUNTER
Contacted patient and scheduled her follow up Friday, December 15, 2017 11:00 AM. Provided her the address for the [facility] office. Patient voiced understanding and will call back if any further questions or concerns.

## 2017-11-22 NOTE — TELEPHONE ENCOUNTER
----- Message from Sis Pablo sent at 11/22/2017 10:57 AM EST -----  Regarding: /Telephone  Pt returning call from office. Best contact number is [phone].

## 2018-01-15 ENCOUNTER — TELEPHONE (OUTPATIENT)
Dept: NEUROLOGY | Age: 68
End: 2018-01-15

## 2018-01-15 NOTE — TELEPHONE ENCOUNTER
----- Message from Leydi Aguila sent at 1/15/2018 10:33 AM EST -----  Regarding: Dr. Stephy Singh would like a call back regarding pain in her upper arm. Pt has had this pain for 3 weeks now. Pt needs a call back today. Pt can be reached at [phone].

## 2018-01-15 NOTE — TELEPHONE ENCOUNTER
Contacted patient. She states she has been having muscle pain in her left arm. She states she went to the ED and it was not heart related.  Scheduled her follow up for 1/16/18 at 8:40am

## 2018-01-16 ENCOUNTER — OFFICE VISIT (OUTPATIENT)
Dept: NEUROLOGY | Age: 68
End: 2018-01-16

## 2018-01-16 VITALS
BODY MASS INDEX: 27.19 KG/M2 | WEIGHT: 144 LBS | HEART RATE: 83 BPM | DIASTOLIC BLOOD PRESSURE: 80 MMHG | HEIGHT: 61 IN | SYSTOLIC BLOOD PRESSURE: 142 MMHG | OXYGEN SATURATION: 95 %

## 2018-01-16 DIAGNOSIS — Z91.199 NO-SHOW FOR APPOINTMENT: Primary | ICD-10-CM

## 2018-01-16 DIAGNOSIS — G35 MULTIPLE SCLEROSIS (HCC): Primary | ICD-10-CM

## 2018-01-16 DIAGNOSIS — M79.2 RADICULAR PAIN IN LEFT ARM: ICD-10-CM

## 2018-01-16 DIAGNOSIS — M79.602 LEFT ARM PAIN: ICD-10-CM

## 2018-01-16 RX ORDER — CLONAZEPAM 0.5 MG/1
0.5 TABLET ORAL
Qty: 60 TAB | Refills: 2 | Status: SHIPPED | OUTPATIENT
Start: 2018-01-16 | End: 2018-01-22 | Stop reason: SDUPTHER

## 2018-01-16 RX ORDER — ESCITALOPRAM OXALATE 20 MG/1
TABLET ORAL
Refills: 0 | COMMUNITY
Start: 2017-10-23 | End: 2018-04-10

## 2018-01-16 RX ORDER — BACLOFEN 20 MG/1
20 TABLET ORAL 3 TIMES DAILY
COMMUNITY
End: 2018-01-16

## 2018-01-16 RX ORDER — MELOXICAM 15 MG/1
15 TABLET ORAL DAILY
COMMUNITY
End: 2018-11-20

## 2018-01-16 RX ORDER — FUROSEMIDE 20 MG/1
TABLET ORAL
Refills: 3 | COMMUNITY
Start: 2017-10-19 | End: 2018-11-20

## 2018-01-16 NOTE — MR AVS SNAPSHOT
[address] [address] 
[phone] Patient: Charlotte White MRN: [id number] WHF:[id number] Visit Information Date & Time Provider Department Dept. Phone Encounter #  
 1/16/2018  9:20 AM Jorge Moore, [address] Neurology Clinic [phone] [id number] Follow-up Instructions Return in about 3 months (around 4/16/2018). Upcoming Health Maintenance Date Due Hepatitis C Screening 1950 DTaP/Tdap/Td series (1 - Tdap) 9/19/1973 BREAST CANCER SCRN MAMMOGRAM 9/19/2002 FOBT Q 1 YEAR AGE 50-75 9/19/2002 ZOSTER VACCINE AGE 60> 7/19/2012 Influenza Age 5 to Adult 8/1/2017 GLAUCOMA SCREENING Q2Y 9/19/2017 OSTEOPOROSIS SCREENING (DEXA) 9/19/2017 Pneumococcal 65+ Low/Medium Risk (1 of 2 - PCV13) 9/19/2017 MEDICARE YEARLY EXAM 9/19/2017 Allergies as of 1/16/2018  Review Complete On: 1/16/2018 By: Berhane Dawson LPN Severity Noted Reaction Type Reactions Iodinated Contrast- Oral And Iv Dye High 04/23/2015    Anaphylaxis Current Immunizations  Never Reviewed No immunizations on file. Not reviewed this visit You Were Diagnosed With   
  
 Codes Comments Multiple sclerosis ([facility] [address]    -  Primary ICD-10-CM: G35 
ICD-9-CM: 250 Radicular pain in left arm     ICD-10-CM: M79.2 ICD-9-CM: 729.2 Left arm pain     ICD-10-CM: M79.602 ICD-9-CM: 729.5 Vitals BP Pulse Height(growth percentile) Weight(growth percentile) SpO2 BMI  
 142/80 (BP 1 Location: Right arm, BP Patient Position: Sitting) 83 5' 1\" (1.549 m) 144 lb (65.3 kg) 95% 27.21 kg/m2 OB Status Smoking Status Hysterectomy Never Smoker BMI and BSA Data Body Mass Index Body Surface Area  
 27.21 kg/m 2 1.68 m 2 Preferred Pharmacy Pharmacy Name Phone  [facility] [address] [address] [address] [facility] [address] [place] [address] [address] [phone] Your Updated Medication List  
  
   
This list is accurate as of: 1/16/18 10:16 AM.  Always use your most recent med list.  
  
  
  
  
 Indy Arciniegaa 250-50 mcg/dose diskus inhaler Generic drug:  fluticasone-salmeterol Take 1 Puff by inhalation every twelve (12) hours. amLODIPine 5 mg tablet Commonly known as:  Allena Jennifer Take 5 mg by mouth daily. busPIRone 5 mg tablet Commonly known as:  BUSPAR TK 1 T PO TID  
  
 cholestyramine-sucrose 4 gram powder MIX AND DRINK 4 GRAMS PO BID  
  
 clonazePAM 0.5 mg tablet Commonly known as:  Raymona Chao Take 1 Tab by mouth nightly. Max Daily Amount: 0.5 mg. Spasms related to Multiple Sclerosis  
  
 dimethyl fumarate 240 mg Cpdr  
Commonly known as:  Laverta Andes Take 240 mg by mouth two (2) times a day. escitalopram oxalate 20 mg tablet Commonly known as:  Rawland Palestine TK 1 T PO D  
  
 furosemide 20 mg tablet Commonly known as:  LASIX TK 1 T PO QD PRN  
  
 hydroCHLOROthiazide 12.5 mg capsule Commonly known as:  Catherne Afshan TK 1 C PO D  
  
 loperamide 2 mg capsule Commonly known as:  IMODIUM TK ONE C PO  BID PRN  
  
 meloxicam 15 mg tablet Commonly known as:  MOBIC Take 15 mg by mouth daily. metoprolol succinate 50 mg XL tablet Commonly known as:  TOPROL-XL Take  by mouth daily. montelukast 10 mg tablet Commonly known as:  SINGULAIR TK 1 T PO  D  
  
 omeprazole 20 mg capsule Commonly known as:  PRILOSEC Take 20 mg by mouth daily. potassium chloride 20 mEq tablet Commonly known as:  K-DUR, KLOR-CON TK 1 T PO  DAILY PRN. TAKE WITH LASIX PREMARIN 0.625 mg tablet Generic drug:  conjugated estrogens Take  by mouth. PROAIR HFA 90 mcg/actuation inhaler Generic drug:  albuterol Take  by inhalation. SPIRIVA WITH HANDIHALER 18 mcg inhalation capsule Generic drug:  tiotropium Take 1 Cap by inhalation daily. Prescriptions Printed Refills  
 clonazePAM (KLONOPIN) 0.5 mg tablet 2 Sig: Take 1 Tab by mouth nightly. Max Daily Amount: 0.5 mg. Spasms related to Multiple Sclerosis Class: Print Route: Oral  
  
Follow-up Instructions Return in about 3 months (around 4/16/2018). To-Do List   
 01/16/2018 Neurology:  EMG LIMITED   
  
 01/16/2018 Imaging:  MRI BRAIN W WO CONT   
  
 01/16/2018 Imaging:  MRI CERV SPINE WO CONT Introducing [facility]! [employer] introduces [employer] patient portal. Now you can access parts of your medical record, email your doctor's office, and request medication refills online. 1. In your internet browser, go to [email or web address] 2. Click on the First Time User? Click Here link in the Sign In box. You will see the New Member Sign Up page. 3. Enter your [employer] Access Code exactly as it appears below. You will not need to use this code after youve completed the sign-up process. If you do not sign up before the expiration date, you must request a new code. · [employer] Access Code: [email or web address] Expires: 1/22/2018 10:40 AM 
 
4. Enter the last four digits of your Social Security Number (xxxx) and Date of Birth (mm/dd/yyyy) as indicated and click Submit. You will be taken to the next sign-up page. 5. Create a [employer] ID. This will be your [employer] login ID and cannot be changed, so think of one that is secure and easy to remember. 6. Create a [employer] password. You can change your password at any time. 7. Enter your Password Reset Question and Answer. This can be used at a later time if you forget your password. 8. Enter your e-mail address. You will receive e-mail notification when new information is available in [address] 9. Click Sign Up. You can now view and download portions of your medical record. 10. Click the Download Summary menu link to download a portable copy of your medical information. If you have questions, please visit the Frequently Asked Questions section of the [employer] website. Remember, [employer] is NOT to be used for urgent needs. For medical emergencies, dial 911. Now available from your iPhone and Android! Please provide this summary of care documentation to your next provider. Your primary care clinician is listed as Aubrey Segovia. If you have any questions after today's visit, please call [phone].

## 2018-01-19 DIAGNOSIS — G35 MULTIPLE SCLEROSIS (HCC): ICD-10-CM

## 2018-01-21 ENCOUNTER — HOSPITAL ENCOUNTER (OUTPATIENT)
Dept: MRI IMAGING | Age: 68
Discharge: HOME | End: 2018-01-21
Attending: PSYCHIATRY & NEUROLOGY
Payer: MEDICARE

## 2018-01-21 VITALS — WEIGHT: 160 LBS | BODY MASS INDEX: 30.23 KG/M2

## 2018-01-21 DIAGNOSIS — G35 MULTIPLE SCLEROSIS (HCC): ICD-10-CM

## 2018-01-21 DIAGNOSIS — M79.2 RADICULAR PAIN IN LEFT ARM: ICD-10-CM

## 2018-01-21 LAB — CREAT BLD-MCNC: 0.7 MG/DL (ref 0.6–1.3)

## 2018-01-21 PROCEDURE — 82565 ASSAY OF CREATININE: CPT

## 2018-01-21 PROCEDURE — 72156 MRI NECK SPINE W/O & W/DYE: CPT

## 2018-01-21 PROCEDURE — 70553 MRI BRAIN STEM W/O & W/DYE: CPT

## 2018-01-21 PROCEDURE — A9576 INJ PROHANCE MULTIPACK: HCPCS | Performed by: PSYCHIATRY & NEUROLOGY

## 2018-01-21 PROCEDURE — 74011250636 HC RX REV CODE- 250/636: Performed by: PSYCHIATRY & NEUROLOGY

## 2018-01-21 RX ADMIN — GADOTERIDOL 15 ML: 279.3 INJECTION, SOLUTION INTRAVENOUS at 15:00

## 2018-01-22 ENCOUNTER — OFFICE VISIT (OUTPATIENT)
Dept: NEUROLOGY | Age: 68
End: 2018-01-22

## 2018-01-22 DIAGNOSIS — G35 MULTIPLE SCLEROSIS (HCC): Primary | ICD-10-CM

## 2018-01-22 DIAGNOSIS — M79.602 LEFT ARM PAIN: Primary | ICD-10-CM

## 2018-01-22 RX ORDER — CLONAZEPAM 0.5 MG/1
TABLET, ORALLY DISINTEGRATING ORAL
Qty: 30 TAB | Refills: 0 | OUTPATIENT
Start: 2018-01-22

## 2018-01-22 RX ORDER — CLONAZEPAM 0.5 MG/1
0.5 TABLET ORAL
Qty: 60 TAB | Refills: 2 | Status: SHIPPED | OUTPATIENT
Start: 2018-01-22 | End: 2018-04-10 | Stop reason: SDUPTHER

## 2018-01-22 NOTE — PROCEDURES
EMG/ NCS Report  [facility], [address]  Ph: [phone]/ [phone]  [address] [facility] [address]: [phone]/ [phone]  Test Date:  2018    Patient: Zenaida Matias : 1950 Physician: Monse Hammond M.D. Sex: Female Height: ' \" Ref Phys: Monse Hammond M.D.   ID#: [id number] Weight:  lbs. Technician: Chevy Calabrese     Patient History:  cc: left arm pain x 3 weeks    EMG & NCV Findings:  Evaluation of the left median motor nerve showed normal distal onset latency (4.3 ms), normal amplitude (5.1 mV), and decreased conduction velocity (Elbow-Wrist, 44 m/s). The left ulnar motor nerve showed no response (Med Elbow), normal distal onset latency (2.9 ms), reduced amplitude (6.4 mV), normal conduction velocity (B Elbow-Wrist, 57 m/s), and normal conduction velocity (A Elbow-B Elbow, 53 m/s). The left median sensory and the left ulnar sensory nerves showed normal distal peak latency (L3.7, L3.1 ms) and normal amplitude (L31.5, L29.3 µV). The left radial sensory nerve showed no response (Wrist). All F Wave latencies were within normal limits. Needle evaluation of the left first dorsal interosseous muscle showed diminished recruitment and increased motor unit amplitude. All remaining muscles (as indicated in the following table) showed no evidence of electrical instability. Impression:  No left median neuropathy, ulnar neuropathy, or cervical radiculopathy. Absent left radial sensory response of unclear clinical significance. Will have patient return to do repeat left radial sensory testing, and do left radial motor as well as right radial sensory/ motor response to evaluate for possible left radial neuropathy.         ___________________________  Monse Hammond M.D.      Nerve Conduction Studies  Anti Sensory Summary Table     Stim Site NR Peak (ms) Norm Peak (ms) P-T Amp (µV) Norm P-T Amp Site1 Site2 Dist (cm)   Left Median Anti Sensory (2nd Digit)  33.7°C   Wrist 3.7 <4 31.5 >13 Wrist 2nd Digit 14.0   Elbow    3.9  29.2  Elbow Wrist 0.0   Left Radial Anti Sensory (Base 1st Digit)  31.9°C   Wrist NR  <2.8  >11 Wrist Base 1st Digit 10.0   Left Ulnar Anti Sensory (5th Digit)  34.4°C   Wrist    3.1 <4.0 29.3 >9 Wrist 5th Digit 14.0   B Elbow    3.1  27.3  B Elbow Wrist 0.0     Motor Summary Table     Stim Site NR Onset (ms) Norm Onset (ms) O-P Amp (mV) Norm O-P Amp Amp (Prev) (%) Site1 Site2 Dist (cm) Jimmie (m/s) Norm Jimmie (m/s)   Left Median Motor (Abd Poll Brev)  32.2°C   Wrist    4.3 <4.5 5.1 >4.1 100.0 Wrist Abd Poll Brev 8.0     Elbow    7.9  4.7  92.2 Elbow Wrist 16.0 44 >49   Left Ulnar Motor (Abd Dig Minimi)  31.3°C   Wrist    2.9 <3.1 6.4 >7.0 100.0 Wrist Abd Dig Minimi 8.0  >50   B Elbow    5.9  6.1  95.3 B Elbow Wrist 17.0 57 >50   A Elbow    7.8  6.1  100.0 A Elbow B Elbow 10.0 53 >50     F Wave Studies     NR F-Lat (ms) Lat Norm (ms) L-R F-Lat (ms) L-R Lat Norm   Left Ulnar (Mrkrs) (Abd Dig Min)  31.5°C      25.73 <32  <2.5     EMG     Side Muscle Nerve Root Ins Act Fibs Psw Recrt Duration Amp Poly Comment   Left 1stDorInt Ulnar C8-T1 Nml Nml Nml Reduced Nml Incr Nml mild reduced rcruit   Left ExtIndicis Radial (Post Int) C7-8 Nml Nml Nml Nml Nml Nml Nml    Left PronatorTeres Median C6-7 Nml Nml Nml Nml Nml Nml Nml    Left Biceps Musculocut C5-6 Nml Nml Nml Nml Nml Nml Nml    Left Triceps Radial C6-7-8 Nml Nml Nml Nml Nml Nml Nml    Left Supraspinatus SupraScap C5-6 Nml Nml Nml Nml Nml Nml Nml    Left Deltoid Axillary C5-6 Nml Nml Nml Nml Nml Nml Nml    Left Mid Cerv Parasp Rami C5,6 Nml Nml Nml Nml Nml Nml Nml    Left Lower Cerv Parasp Rami C7,T1 Nml Nml Nml Nml Nml Nml Nml      Waveforms:

## 2018-01-23 ENCOUNTER — DOCUMENTATION ONLY (OUTPATIENT)
Dept: NEUROLOGY | Age: 68
End: 2018-01-23

## 2018-01-31 PROBLEM — M79.602 LEFT ARM PAIN: Status: ACTIVE | Noted: 2018-01-31

## 2018-02-15 ENCOUNTER — TELEPHONE (OUTPATIENT)
Dept: NEUROLOGY | Age: 68
End: 2018-02-15

## 2018-02-15 NOTE — TELEPHONE ENCOUNTER
Patient's daughter (HIPAA verified) called asking if she can be seen by Dr. Roxane Ibrahim or NP as soon as possible. Patient is experience severe muscle spasms that render her unable to walk. I stated Dr. Saurabh Colbert availability as well as the NP's. Earliest appt would be Feb 26th with NP Adalgisa, but told patient I would send message to nurse in the event Dr. Roxane Ibrahim may have room to fit her into schedule. Told patient I could not guarantee Dr Roxane Ibrahim or NP could see her any sooner than Feb 26th date. Daughter stated understanding.  Also stated patient may need to go to ER if symptoms persist. Daughter can be reached at [phone]

## 2018-02-16 ENCOUNTER — OFFICE VISIT (OUTPATIENT)
Dept: NEUROLOGY | Age: 68
End: 2018-02-16

## 2018-02-16 VITALS
HEIGHT: 61 IN | HEART RATE: 87 BPM | WEIGHT: 160 LBS | BODY MASS INDEX: 30.21 KG/M2 | SYSTOLIC BLOOD PRESSURE: 142 MMHG | OXYGEN SATURATION: 96 % | DIASTOLIC BLOOD PRESSURE: 90 MMHG

## 2018-02-16 DIAGNOSIS — M79.602 LEFT ARM PAIN: ICD-10-CM

## 2018-02-16 DIAGNOSIS — G35 MULTIPLE SCLEROSIS (HCC): Primary | ICD-10-CM

## 2018-02-16 DIAGNOSIS — M79.2 NEUROPATHIC PAIN: ICD-10-CM

## 2018-02-16 DIAGNOSIS — M79.602 LEFT ARM PAIN: Primary | ICD-10-CM

## 2018-02-16 RX ORDER — GABAPENTIN 100 MG/1
200 CAPSULE ORAL 3 TIMES DAILY
Qty: 180 CAP | Refills: 1 | Status: SHIPPED | OUTPATIENT
Start: 2018-02-16 | End: 2018-03-18

## 2018-02-16 NOTE — PROGRESS NOTES
Patient reports increased muscle spasms in her neck down to tail bone that have worsened in the past few weeks. She states she has been having trouble walking too.

## 2018-02-16 NOTE — PROGRESS NOTES
Interval HPI: This is a 74 y.o. female who is following up for MS    Chief Complaint   Patient presents with    Multiple Sclerosis    Spasms     Interval Hx    1) Left arm pain:  Underwent EMG NCS left arm: no CTS, ulnar neuropathy, or cervical radiculopathy. Repeated MRI C-spine as prior study was > 1 year ago (and had showed disc bulge/ extrusion at C5-6 causing bilateral foraminal narrowing). The updated MRI C-spine shows mild disc-osteophyte complex with mild central stenosis and mild narrowing of neural foramina at C5-6, similar to prior study. No other significant abnormalites of C-spine, no lesions in spinal cord. Pt says the left arm pain (lateral arm/ triceps area) is much better compared to last visit, but still there to some degree. 2) Muscle spasms  Reports leg cramping not as bad as last visit. Now taking Klonopin 0.5 mg QHS  Tried/ failed: baclofen, flexeril    3) Multiple sclerosis  Daughter accompanies her today and pt denies any recent flares/ MS exacerbation. Continues taking Tecfidera 240 mg BID. Reports having recent labs done with PCP (will have them sent). No interval episodes of vision loss, eye pain, or extremity weakness. Says she loses her balance more easily    Chronic MS symptoms:   gait unsteadiness/ falls  bladder urgency  bowel urgency   spasticity (treated in past with baclofen)  \"mini-seizure\" (episodes where she \"spaces out\", EEG in past normal, Keppra d/c'd, no recent spells)    4) Spine pain  Patient reports that since last visit, having sudden severe pain shooting from neck down her spine, lasting for few minutes at a time, happening 1-3 times a day. Occurs in any position. Not clearly triggered by head/ neck position.   Doesn't think it's same as her leg cramping or back spasms she was describing in the past.  She has describe this pain in the past.      MS Flare Hx:  : Pseudo-exacerbation due to UTI    Labs (last reviewed):  6-25-16: CMP (normal Cr 0.85, Na 141)  7-1-16: WBC 5.2k, abs Lymphocyte 1.4k, % lymphocyte 27.8 (all within normal range)      =====================================  Brief Hx:   74 y.o. female with MS, Hep C, dx with MS around 9148-9836 while living in [place]. Initial symptoms was numbness in legs, unsteady gait, speech difficulty. Recalls having MRI Brain and LP that were abnormal. Tried Rebif, Avonex, Copaxone, and Betaseron in the past, but she's not sure why they were discontinued. She denies having any side effects to any of the injections, she's not sure if they were changed because of disease progression or relapses. She recalls being hospitalized in March of 2014 for an MS relapse (confirmed by MRI Brain, and ? C-spine) and getting a course of IV solumedrol. The last MS med she was started on was Tecfidera and says no problems with that. Prior testing  EEG: normal awake/ drowsy. MRI Brain 3-2016: no significant changes from prior MRI Brain. MRI Brain 5-13-15: moderate white matter lesion burden consistent with Multiple Sclerosis. MRI C-spine 5-13-15: no cord lesions; moderate stenosis at C5-6. MRI T-spine done in 11/ 2013 (outside facility; see media section) didn't show any spinal stenosis or cord lesions. NMO antibodies were negative. MRI Brain (+/- contrast) done at [facility] on 2-23-17. They didn't have a prior MRI to compare it to (compared to a prior CT head) but report indicates scattered areas of T2 hyperintensity consistent with hx of MS, no abnormal enhancement. EMG (left leg) for c/o left lateral leg numbness, tingling. Shows a mild, chronic left L5 radiculopathy. No electrodiagnostic evidence of typical length-dependent peripheral neuropathy, thought that doesn't exclude possibility of small fiber neuropathy. Consider skin-punch biopsy if indicated. Brief ROS: as above  There have been no significant changes in PMHx, PSHx, SHx except as noted above.      Allergies   Allergen Reactions    Iodinated Contrast- Oral And Iv Dye Anaphylaxis     Current Outpatient Prescriptions   Medication Sig Dispense Refill    gabapentin (NEURONTIN) 100 mg capsule Take 2 Caps by mouth three (3) times daily for 30 days. Indications: NEUROPATHIC PAIN 180 Cap 1    clonazePAM (KLONOPIN) 0.5 mg tablet Take 1 Tab by mouth nightly. Max Daily Amount: 0.5 mg. Spasms related to Multiple Sclerosis 60 Tab 2    escitalopram oxalate (LEXAPRO) 20 mg tablet TK 1 T PO D  0    furosemide (LASIX) 20 mg tablet TK 1 T PO QD PRN  3    meloxicam (MOBIC) 15 mg tablet Take 15 mg by mouth daily.  dimethyl fumarate (TECFIDERA) 240 mg cpDR Take 240 mg by mouth two (2) times a day. 60 Cap 2    tiotropium (SPIRIVA WITH HANDIHALER) 18 mcg inhalation capsule Take 1 Cap by inhalation daily.  montelukast (SINGULAIR) 10 mg tablet TK 1 T PO  D  3    hydroCHLOROthiazide (MICROZIDE) 12.5 mg capsule TK 1 C PO D  5    loperamide (IMODIUM) 2 mg capsule TK ONE C PO  BID PRN  0    potassium chloride (K-DUR, KLOR-CON) 20 mEq tablet TK 1 T PO  DAILY PRN. TAKE WITH LASIX  0    omeprazole (PRILOSEC) 20 mg capsule Take 20 mg by mouth daily.  albuterol (PROAIR HFA) 90 mcg/actuation inhaler Take  by inhalation.  fluticasone-salmeterol (ADVAIR DISKUS) 250-50 mcg/dose diskus inhaler Take 1 Puff by inhalation every twelve (12) hours.  metoprolol succinate (TOPROL-XL) 50 mg XL tablet Take  by mouth daily.  amLODIPine (NORVASC) 5 mg tablet Take 5 mg by mouth daily.  cholestyramine-sucrose 4 gram powder MIX AND DRINK 4 GRAMS PO BID  0    busPIRone (BUSPAR) 5 mg tablet TK 1 T PO TID  0    conjugated estrogens (PREMARIN) 0.625 mg tablet Take  by mouth. Physical Exam  Blood pressure 142/90, pulse 87, height 5' 1\" (1.549 m), weight 72.6 kg (160 lb), SpO2 96 %. No acute distress  Neck: no stiffness  Exts: no edema    Focused Neurological Exam     Mental status: Alert and oriented to person, place situation.     Language: normal fluency and comprehension; no dysarthria. CNs:   Visual fields grossly normal  Extraocular movements intact, no nystagmus  Face appears symmetric and facial strength normal.    Hearing is intact to casual conversation. Sensory: intact LT in both legs  Motor: 5/5 in arms, 4+/ 5 in legs (reduced muscle mass)    Reflexes:  2+ patellars    Gait: mild unsteady    Impression    ICD-10-CM ICD-9-CM    1. Multiple sclerosis (HCC) G35 340    2. Neuropathic pain M79.2 729.2 gabapentin (NEURONTIN) 100 mg capsule   3. Left arm pain M79.602 729.5 gabapentin (NEURONTIN) 100 mg capsule        74 y.o. yo female with MS, Hepatitis C (followed by GI/ hepatology)    1) Multiple Sclerosis  No recent exacerbations  MRI C-spine without lesions, MRI Brain with unchanged lesion burden compared to 3-2016.     Continue on Tecfidera one cap BID    2) Leg cramping, insomnia  Continue Klonopin 0.5 mg QHS     3) Pain radiating down spine  Non-positional, not triggered by anything pt can tell  Likely neuropathic in nature  Try Gabapentin 100 mg capsule, 2 caps TID   If no SEFx but not helping, will increase it to 300 TID  Common SEFx discussed with pt-daughter    4) Left arm radicular pain  Normal left upper extremity EMG  MRI C-spine with disc bulge-osteophyte complex at C5-6, unchanged compared to last scan    5) F/u in 3 months

## 2018-02-16 NOTE — MR AVS SNAPSHOT
[facility] 
 
 
 [place] [address] [place] [address] [phone] Patient: Preeti Santillan MRN: [id number] SAP:[id number] Visit Information Date & Time Provider Department Dept. Phone Encounter #  
 2/16/2018  9:00 AM Eduard Alonzo MD [facility] Neurology [facility] [phone] [id number] Follow-up Instructions Return in about 3 months (around 5/16/2018). Your Appointments 2/16/2018  2:00 PM  
PROCEDURE with EMG SCHEDULE 1991 [facility] Appt Note: EMG  
 [place] [address] [place] [address] [phone]  
  
   
 [place] [address] [address] [address] [place] 2/19/2018  2:00 PM  
PROCEDURE with EMG SCHEDULE 1991 [facility] ([facility]) Appt Note: lt. radial sensory/radial motor; lt. radial sensory/radial motor [place] [address] [place] [address] [phone] 4/17/2018 10:40 AM  
Follow Up with Eduard Alonzo MD  
[address] [place] [address] Neurology Clinic [facility]) Appt Note: f/up results/MS  
 [address] [address]  
[facility] [address] [address] [facility] [address] Upcoming Health Maintenance Date Due Hepatitis C Screening 1950 DTaP/Tdap/Td series (1 - Tdap) 9/19/1973 BREAST CANCER SCRN MAMMOGRAM 9/19/2002 FOBT Q 1 YEAR AGE 50-75 9/19/2002 ZOSTER VACCINE AGE 60> 7/19/2012 Influenza Age 5 to Adult 8/1/2017 GLAUCOMA SCREENING Q2Y 9/19/2017 OSTEOPOROSIS SCREENING (DEXA) 9/19/2017 Pneumococcal 65+ Low/Medium Risk (1 of 2 - PCV13) 9/19/2017 MEDICARE YEARLY EXAM 9/19/2017 Allergies as of 2/16/2018  Review Complete On: 2/16/2018 By: Marcelle Person, LPN Severity Noted Reaction Type Reactions Iodinated Contrast- Oral And Iv Dye High 04/23/2015    Anaphylaxis Current Immunizations  Never Reviewed No immunizations on file. Not reviewed this visit You Were Diagnosed With   
  
 Codes Comments [facility] sclerosis ([facility]    -  Primary ICD-10-CM: G35 
ICD-9-CM: 995 Neuropathic pain     ICD-10-CM: M79.2 ICD-9-CM: 729.2 Left arm pain     ICD-10-CM: M79.602 ICD-9-CM: 729.5 Vitals BP Pulse Height(growth percentile) Weight(growth percentile) SpO2 BMI  
 142/90 (BP 1 Location: Left arm, BP Patient Position: Sitting) 87 5' 1\" (1.549 m) 160 lb (72.6 kg) 96% 30.23 kg/m2 OB Status Smoking Status Hysterectomy Never Smoker BMI and BSA Data Body Mass Index Body Surface Area  
 30.23 kg/m 2 1.77 m 2 Preferred Pharmacy Pharmacy Name Phone [address] [place] [address] [facility]  [facility] [phone] Your Updated Medication List  
  
   
This list is accurate as of: 2/16/18 10:52 AM.  Always use your most recent med list.  
  
  
  
  
 ADVAIR DISKUS 250-50 mcg/dose diskus inhaler Generic drug:  fluticasone-salmeterol Take 1 Puff by inhalation every twelve (12) hours. amLODIPine 5 mg tablet Commonly known as:  Joel Tejeda Take 5 mg by mouth daily. busPIRone 5 mg tablet Commonly known as:  BUSPAR TK 1 T PO TID  
  
 cholestyramine-sucrose 4 gram powder MIX AND DRINK 4 GRAMS PO BID  
  
 clonazePAM 0.5 mg tablet Commonly known as:  Yen Morgan Take 1 Tab by mouth nightly. Max Daily Amount: 0.5 mg. Spasms related to Multiple Sclerosis  
  
 dimethyl fumarate 240 mg Cpdr  
Commonly known as:  Bradford Raspberry Take 240 mg by mouth two (2) times a day. escitalopram oxalate 20 mg tablet Commonly known as:  Lopez Peters TK 1 T PO D  
  
 furosemide 20 mg tablet Commonly known as:  LASIX TK 1 T PO QD PRN  
  
 gabapentin 100 mg capsule Commonly known as:  NEURONTIN Take 2 Caps by mouth three (3) times daily for 30 days. Indications: NEUROPATHIC PAIN  
  
 hydroCHLOROthiazide 12.5 mg capsule Commonly known as:  Trula  TK 1 C PO D  
  
 loperamide 2 mg capsule Commonly known as:  IMODIUM TK ONE C PO  BID PRN  
  
 meloxicam 15 mg tablet Commonly known as:  MOBIC Take 15 mg by mouth daily. metoprolol succinate 50 mg XL tablet Commonly known as:  TOPROL-XL Take  by mouth daily. montelukast 10 mg tablet Commonly known as:  SINGULAIR TK 1 T PO  D  
  
 omeprazole 20 mg capsule Commonly known as:  PRILOSEC Take 20 mg by mouth daily. potassium chloride 20 mEq tablet Commonly known as:  K-DUR, KLOR-CON TK 1 T PO  DAILY PRN. TAKE WITH LASIX PREMARIN 0.625 mg tablet Generic drug:  conjugated estrogens Take  by mouth. PROAIR HFA 90 mcg/actuation inhaler Generic drug:  albuterol Take  by inhalation. SPIRIVA WITH HANDIHALER 18 mcg inhalation capsule Generic drug:  tiotropium Take 1 Cap by inhalation daily. Prescriptions Sent to Pharmacy Refills  
 gabapentin (NEURONTIN) 100 mg capsule 1 Sig: Take 2 Caps by mouth three (3) times daily for 30 days. Indications: NEUROPATHIC PAIN Class: Normal  
 Pharm[facility] [place] [address] #: [phone] Route: Oral  
  
Follow-up Instructions Return in about 3 months (around 5/16/2018). Introducing [facility]! [facility] introduces [employer] patient portal. Now you can access parts of your medical record, email your doctor's office, and request medication refills online. 1. In your internet browser, go to [email or web address] 2. Click on the First Time User? Click Here link in the Sign In box. You will see the New Member Sign Up page. 3. Enter your [employer] Access Code exactly as it appears below. You will not need to use this code after youve completed the sign-up process. If you do not sign up before the expiration date, you must request a new code. · [employer] Access Code: [email or web address] Expires: 5/14/2018  4:35 PM 
 
4. Enter the last four digits of your Social Security Number (xxxx) and Date of Birth (mm/dd/yyyy) as indicated and click Submit. You will be taken to the next sign-up page. 5. Create a [employer]Me ID. This will be your [employer] login ID and cannot be changed, so think of one that is secure and easy to remember. 6. Create a [employer] password. You can change your password at any time. 7. Enter your Password Reset Question and Answer. This can be used at a later time if you forget your password. 8. Enter your e-mail address. You will receive e-mail notification when new information is available in [address]. 9. Click Sign Up. You can now view and download portions of your medical record. 10. Click the Download Summary menu link to download a portable copy of your medical information. If you have questions, please visit the Frequently Asked Questions section of the [employer] website. Remember, [employer]Me is NOT to be used for urgent needs. For medical emergencies, dial 911. Now available from your iPhone and Android! Please provide this summary of care documentation to your next provider. Your primary care clinician is listed as Phys Other. If you have any questions after today's visit, please call [phone].

## 2018-02-26 ENCOUNTER — TELEPHONE (OUTPATIENT)
Dept: NEUROLOGY | Age: 68
End: 2018-02-26

## 2018-02-26 NOTE — TELEPHONE ENCOUNTER
Contacted patient, advised her to fax BELINDA from [facility] ER for MRI reports. Once received, reports will be faxed. Patient voiced understanding and will call back if any further questions or concerns.

## 2018-02-26 NOTE — TELEPHONE ENCOUNTER
Pt is at [facility] seeing dr. Samanta Rosario? Not sure of the correct spelling. They need a copy of her mri, she said we referred her to them. Their fax number is [phone].

## 2018-03-22 ENCOUNTER — TELEPHONE (OUTPATIENT)
Dept: NEUROLOGY | Age: 68
End: 2018-03-22

## 2018-03-22 NOTE — TELEPHONE ENCOUNTER
Patient described similar symptoms in February. Do you want to see her earlier than 5/15/18 or is there something she can do prior to being seen?

## 2018-03-22 NOTE — TELEPHONE ENCOUNTER
----- Message from Natalee Christian sent at 3/22/2018  9:57 AM EDT -----  Regarding: Vahid Her/telephone  Pt would like a call back from the nurse regarding getting an earlier appt date. Pt is having muscle spams. The gabapentin is not working. Pt is currently scheduled to be seen on 5/15/18. Pt would like to be seen as soon as possible. Pt is in a lot of pain. The pain in at the top of neck all the way to her spine, it is so bad that she can feel it in her chest and the front of her body. Pt can be just sitting down and the pain occurs. Pt can be reached at  [phone].

## 2018-03-26 DIAGNOSIS — M54.2 NECK PAIN: Primary | ICD-10-CM

## 2018-03-26 DIAGNOSIS — M79.2 NEUROPATHIC PAIN: ICD-10-CM

## 2018-03-26 RX ORDER — PREDNISONE 10 MG/1
TABLET ORAL
Qty: 24 TAB | Refills: 0 | Status: SHIPPED | OUTPATIENT
Start: 2018-03-26 | End: 2018-04-10

## 2018-03-26 RX ORDER — GABAPENTIN 600 MG/1
TABLET ORAL
Qty: 90 TAB | Refills: 0 | Status: SHIPPED | OUTPATIENT
Start: 2018-03-26 | End: 2018-05-24 | Stop reason: SDUPTHER

## 2018-03-26 NOTE — TELEPHONE ENCOUNTER
Called pt back. She says pain is severe, radiating from neck down her spine. Has been taking Clonazepam 0.5 mg QHS to help with insomnia and leg spasms, says that was helping. Added Gabapentin 200 mg TID last visit, not helping. Advised her to take 3 of her 100 mg Gabapentin capsules THREE times a day (300 mg TID) and I'd send in a new Rx for Gabapentin 600 mg TID. If she doesn't notice reduction in her pain over the next week, advised her to take TWO of her Clonazepam 0.5 mg tablets at bedtime. Will also send in Rx for Prednisone taper x 6 days to help with her pain, whether it be from her cervical disc issues or from her MS. Will have nurse change f/u visit to 2 weeks from now.

## 2018-03-26 NOTE — TELEPHONE ENCOUNTER
Contacted patient and scheduled follow up at [facility] for Tuesday, April 10, 2018 01:00 PM. Patient voiced understanding and will call back if any further questions or concerns.

## 2018-03-27 ENCOUNTER — OP HISTORICAL/CONVERTED ENCOUNTER (OUTPATIENT)
Dept: OTHER | Age: 68
End: 2018-03-27

## 2018-04-10 ENCOUNTER — OFFICE VISIT (OUTPATIENT)
Dept: NEUROLOGY | Age: 68
End: 2018-04-10

## 2018-04-10 ENCOUNTER — DOCUMENTATION ONLY (OUTPATIENT)
Dept: NEUROLOGY | Age: 68
End: 2018-04-10

## 2018-04-10 VITALS
OXYGEN SATURATION: 98 % | WEIGHT: 167 LBS | HEIGHT: 61 IN | DIASTOLIC BLOOD PRESSURE: 88 MMHG | SYSTOLIC BLOOD PRESSURE: 158 MMHG | BODY MASS INDEX: 31.53 KG/M2 | HEART RATE: 101 BPM

## 2018-04-10 DIAGNOSIS — G35 MULTIPLE SCLEROSIS (HCC): Primary | ICD-10-CM

## 2018-04-10 RX ORDER — CLONAZEPAM 0.5 MG/1
0.5 TABLET ORAL
Qty: 30 TAB | Refills: 2 | Status: SHIPPED | OUTPATIENT
Start: 2018-04-10 | End: 2018-05-10

## 2018-04-10 NOTE — PROGRESS NOTES
Interval HPI: This is a 74 y.o. female who is following up for MS    Chief Complaint   Patient presents with    Spasms    Multiple Sclerosis     Interval Hx    Pt called back on 3-22-18 saying the pain she had in her neck radiating down her spine (complained of it before) was severe and not relieved with Gabapentin 200 mg TID. She had been taking Clonazepam 0.5 mg QHS and reported that it helped with insomnia and leg spasms. Instructed her to increase Gabapentin to 300 mg, three times a day then fill new Rx for Gabapentin 600 mg HALF tablet TID. Also sent in prednisone taper (6 days) in case some of the pain was coming from her known cervical ddd. Advised her that if pain wasn't controlled, to increase her Clonazepam to two 0.5 mg tabs at bedtime. Says since increasing the gabapentin to 300 mg TID not having as many episodes of severe pain radiating down spine, not as intense as they were. Not happening daily, now a few days a week. No SEFx (weight gain, fluid retention, or other symptoms) from increasing gabapentin that she can tell. Still taking Clonazepam 0.5 mg one tab QHS, occasionally 2 tabs QHS. 2) Left arm radicular pain has resolved  3) Muscle spasms controlled with the Klonopin. Tried/ failed: baclofen, flexeril  4) Multiple sclerosis  Denies any recent MS flares/ exacerbations. No interval episodes of vision loss, eye pain, or extremity weakness. Says she loses her balance more easily. Continues taking Tecfidera 240 mg BID. Reports having recent labs done with Hepatologist (for Hx of Chronic Hep C).       Chronic MS symptoms: gait unsteadiness/ falls, bladder urgency, bowel urgency , spasticity (treated in past with baclofen), \"mini-seizure\" (episodes where she \"spaces out\", EEG in past normal, Keppra d/c'd, no recent spells)     MS Flare Hx:  : Pseudo-exacerbation due to UTI      Brief ROS: as above  There have been no significant changes in PMHx, PSHx, SHx except as noted above.     =====================================  Brief Hx:   74 y.o. female with MS, Hep C, dx with MS around 1644-3864 while living in [facility]. Initial symptoms was numbness in legs, unsteady gait, speech difficulty. Recalls having MRI Brain and LP that were abnormal. Tried Rebif, Avonex, Copaxone, and Betaseron in the past, but she's not sure why they were discontinued. She denies having any side effects to any of the injections, she's not sure if they were changed because of disease progression or relapses. She recalls being hospitalized in March of 2014 for an MS relapse (confirmed by MRI Brain, and ? C-spine) and getting a course of IV solumedrol. The last MS med she was started on was Tecfidera and says no problems with that. Prior testing  EEG: normal awake/ drowsy. MRI Brain 3-2016: no significant changes from prior MRI Brain. MRI Brain 5-13-15: moderate white matter lesion burden consistent with Multiple Sclerosis. MRI C-spine 5-13-15: no cord lesions; moderate stenosis at C5-6. MRI T-spine done in 11/ 2013 (outside facility; see media section) didn't show any spinal stenosis or cord lesions. NMO antibodies were negative. MRI Brain (+/- contrast) done at [facility] on 2-23-17. They didn't have a prior MRI to compare it to (compared to a prior CT head) but report indicates scattered areas of T2 hyperintensity consistent with hx of MS, no abnormal enhancement. EMG (left leg) for c/o left lateral leg numbness, tingling. Shows a mild, chronic left L5 radiculopathy. No electrodiagnostic evidence of typical length-dependent peripheral neuropathy, thought that doesn't exclude possibility of small fiber neuropathy. Consider skin-punch biopsy if indicated. EMG (left arm) for c/o left arm pain: no CTS, ulnar neuropathy, or cervical radiculopathy. Repeated MRI C-spine as prior study was > 1 year ago (and had showed disc bulge/ extrusion at C5-6 causing bilateral foraminal narrowing).   The updated MRI C-spine shows mild disc-osteophyte complex with mild central stenosis and mild narrowing of neural foramina at C5-6, similar to prior study. No other significant abnormalites of C-spine, no lesions in spinal cord. Allergies   Allergen Reactions    Iodinated Contrast- Oral And Iv Dye Anaphylaxis     Current Outpatient Prescriptions   Medication Sig Dispense Refill    clonazePAM (KLONOPIN) 0.5 mg tablet Take 1 Tab by mouth nightly for 30 days. Max Daily Amount: 0.5 mg. Spasms related to Multiple Sclerosis 30 Tab 2    gabapentin (NEURONTIN) 600 mg tablet Take HALF tablet 8 AM, Noon, and 6 PM.  For neuropathic pain related to Multiple Sclerosis. Caution: may cause drowsiness. 90 Tab 0    meloxicam (MOBIC) 15 mg tablet Take 15 mg by mouth daily.  dimethyl fumarate (TECFIDERA) 240 mg cpDR Take 240 mg by mouth two (2) times a day. 60 Cap 2    tiotropium (SPIRIVA WITH HANDIHALER) 18 mcg inhalation capsule Take 1 Cap by inhalation daily.  montelukast (SINGULAIR) 10 mg tablet TK 1 T PO  D  3    hydroCHLOROthiazide (MICROZIDE) 12.5 mg capsule TK 1 C PO D  5    loperamide (IMODIUM) 2 mg capsule TK ONE C PO  BID PRN  0    potassium chloride (K-DUR, KLOR-CON) 20 mEq tablet TK 1 T PO  DAILY PRN. TAKE WITH LASIX  0    omeprazole (PRILOSEC) 20 mg capsule Take 20 mg by mouth daily.  albuterol (PROAIR HFA) 90 mcg/actuation inhaler Take  by inhalation.  fluticasone-salmeterol (ADVAIR DISKUS) 250-50 mcg/dose diskus inhaler Take 1 Puff by inhalation every twelve (12) hours.  metoprolol succinate (TOPROL-XL) 50 mg XL tablet Take  by mouth daily.  amLODIPine (NORVASC) 5 mg tablet Take 5 mg by mouth daily.  furosemide (LASIX) 20 mg tablet TK 1 T PO QD PRN  3    cholestyramine-sucrose 4 gram powder MIX AND DRINK 4 GRAMS PO BID  0       Physical Exam  Blood pressure 158/88, pulse (!) 101, height 5' 1\" (1.549 m), weight 75.8 kg (167 lb), SpO2 98 %.     No acute distress, appears comfortable  Neck: no stiffness  Exts: no edema    Focused Neurological Exam     Mental status: Alert and oriented to person, place situation. Language: normal fluency and comprehension; no dysarthria. CNs:   Visual fields grossly normal  Extraocular movements intact, no nystagmus  Face appears symmetric and facial strength normal.    Hearing is intact to casual conversation. Sensory: intact LT in both legs  Motor: 5/5 in arms, 4+/ 5 in legs (reduced muscle mass)    Reflexes:  2+ patellars    Gait: mild unsteady (unchanged)    Impression    ICD-10-CM ICD-9-CM    1. Multiple sclerosis (HCC) I62 618 METABOLIC PANEL, BASIC      CBC WITH AUTOMATED DIFF      clonazePAM (KLONOPIN) 0.5 mg tablet        74 y.o. yo female with MS, Hepatitis C (followed by GI/ hepatology)    1) Multiple Sclerosis  No recent exacerbations. MRI Brain and C-spine in Jan 2018 show stable CNS lesion burden compared to prior studies in March 2016. Continue on Tecfidera 240 mg one tab BID. Pt signed BELINDA so I can get last lab results from Hepatologist.  If no labs received then pt will get CBC and CMP done (given lab slips). 2) Leg cramping, insomnia  Continue Klonopin 0.5 mg QHS     3) Pain radiating down spine/ neuropathic pain  Continue with gabapentin 600 mg HALF tablet TID    4) Left arm radicular pain (resolved)  Normal left upper extremity EMG.   MRI C-spine with disc bulge-osteophyte complex at C5-6, unchanged compared to last scan    5) F/u in 3 months      Signed By: Higinio Ferguson MD     April 10, 2018

## 2018-04-10 NOTE — MR AVS SNAPSHOT
[address] [address] [facility] [address] 
[phone] Patient: Russ Benton MRN: [id number] QTY:3/23/6644 Visit Information Date & Time Provider Department Dept. Phone Encounter #  
 4/10/2018  1:00 PM Marisel Upton MD [facility] [phone] [id number] Follow-up Instructions Return in about 3 months (around 7/10/2018). Your Appointments 5/15/2018  9:40 AM  
Follow Up with Marisel Upton MD  
[address] [place] [address] [facility]: follow up [facility] [address] [address]  
[place] [address] [address] [address] Upcoming Health Maintenance Date Due Hepatitis C Screening 1950 DTaP/Tdap/Td series (1 - Tdap) 9/19/1973 BREAST CANCER SCRN MAMMOGRAM 9/19/2002 FOBT Q 1 YEAR AGE 50-75 9/19/2002 ZOSTER VACCINE AGE 60> 7/19/2012 Influenza Age 5 to Adult 8/1/2017 GLAUCOMA SCREENING Q2Y 9/19/2017 Bone Densitometry (Dexa) Screening 9/19/2017 Pneumococcal 65+ Low/Medium Risk (1 of 2 - PCV13) 9/19/2017 MEDICARE YEARLY EXAM 3/26/2018 Allergies as of 4/10/2018  Review Complete On: 4/10/2018 By: Chris Abbasi LPN Severity Noted Reaction Type Reactions Iodinated Contrast- Oral And Iv Dye High 04/23/2015    Anaphylaxis Current Immunizations  Never Reviewed No immunizations on file. Not reviewed this visit You Were Diagnosed With   
  
 Codes Comments [facility] sclerosis ([facility]    -  Primary ICD-10-CM: G35 
ICD-9-CM: 055 Vitals BP Pulse Height(growth percentile) Weight(growth percentile) SpO2 BMI  
 158/88 (BP 1 Location: Left arm, BP Patient Position: Sitting) (!) 101 5' 1\" (1.549 m) 167 lb (75.8 kg) 98% 31.55 kg/m2 OB Status Smoking Status Hysterectomy Never Smoker Vitals History BMI and BSA Data Body Mass Index Body Surface Area 31.55 kg/m 2 1.81 m 2 Preferred Pharmacy Pharmacy Name Phone [facility] [facility]  Freddy judd [phone] Your Updated Medication List  
  
   
This list is accurate as of 4/10/18  1:34 PM.  Always use your most recent med list.  
  
  
  
  
 Donalda Saulo 250-50 mcg/dose diskus inhaler Generic drug:  fluticasone-salmeterol Take 1 Puff by inhalation every twelve (12) hours. amLODIPine 5 mg tablet Commonly known as:  Cathie Matar Take 5 mg by mouth daily. cholestyramine-sucrose 4 gram powder MIX AND DRINK 4 GRAMS PO BID  
  
 clonazePAM 0.5 mg tablet Commonly known as:  Evlyn Adriano Take 1 Tab by mouth nightly for 30 days. Max Daily Amount: 0.5 mg. Spasms related to Multiple Sclerosis  
  
 dimethyl fumarate 240 mg Cpdr  
Commonly known as:  Angela Saint Johns Take 240 mg by mouth two (2) times a day. furosemide 20 mg tablet Commonly known as:  LASIX TK 1 T PO QD PRN  
  
 gabapentin 600 mg tablet Commonly known as:  NEURONTIN Take HALF tablet 8 AM, Noon, and 6 PM.  For neuropathic pain related to Multiple Sclerosis. Caution: may cause drowsiness. hydroCHLOROthiazide 12.5 mg capsule Commonly known as:  Liberty Donate TK 1 C PO D  
  
 loperamide 2 mg capsule Commonly known as:  IMODIUM TK ONE C PO  BID PRN  
  
 meloxicam 15 mg tablet Commonly known as:  MOBIC Take 15 mg by mouth daily. metoprolol succinate 50 mg XL tablet Commonly known as:  TOPROL-XL Take  by mouth daily. montelukast 10 mg tablet Commonly known as:  SINGULAIR TK 1 T PO  D  
  
 omeprazole 20 mg capsule Commonly known as:  PRILOSEC Take 20 mg by mouth daily. potassium chloride 20 mEq tablet Commonly known as:  K-DUR, KLOR-CON TK 1 T PO  DAILY PRN. TAKE WITH LASIX PROAIR HFA 90 mcg/actuation inhaler Generic drug:  albuterol Take  by inhalation. SPIRIVA WITH HANDIHALER 18 mcg inhalation capsule Generic drug:  tiotropium Take 1 Cap by inhalation daily. Prescriptions Printed Refills  
 clonazePAM (KLONOPIN) 0.5 mg tablet 2 Sig: Take 1 Tab by mouth nightly for 30 days. Max Daily Amount: 0.5 mg. Spasms related to Multiple Sclerosis Class: Print Route: Oral  
  
We Performed the Following CBC WITH AUTOMATED DIFF [13652 CPT(R)] METABOLIC PANEL, BASIC [68285 CPT(R)] Follow-up Instructions Return in about 3 months (around 7/10/2018). Introducing [facility] & HEALTH SERVICES! Lois Peña introduces [employer] patient portal. Now you can access parts of your medical record, email your doctor's office, and request medication refills online. 1. In your internet browser, go to [email or web address] 2. Click on the First Time User? Click Here link in the Sign In box. You will see the New Member Sign Up page. 3. Enter your [employer] Access Code exactly as it appears below. You will not need to use this code after youve completed the sign-up process. If you do not sign up before the expiration date, you must request a new code. · [employer] Access Code: [email or web address] Expires: 5/14/2018  5:35 PM 
 
4. Enter the last four digits of your Social Security Number (xxxx) and Date of Birth (mm/dd/yyyy) as indicated and click Submit. You will be taken to the next sign-up page. 5. Create a [employer] ID. This will be your [employer] login ID and cannot be changed, so think of one that is secure and easy to remember. 6. Create a [employer] password. You can change your password at any time. 7. Enter your Password Reset Question and Answer. This can be used at a later time if you forget your password. 8. Enter your e-mail address. You will receive e-mail notification when new information is available in [address] 9. Click Sign Up. You can now view and download portions of your medical record. 10. Click the Download Summary menu link to download a portable copy of your medical information. If you have questions, please visit the Frequently Asked Questions section of the [employer] website. Remember, [employer] is NOT to be used for urgent needs. For medical emergencies, dial 911. Now available from your iPhone and Android! Please provide this summary of care documentation to your next provider. Your primary care clinician is listed as Phys Other. If you have any questions after today's visit, please call [phone].

## 2018-04-25 ENCOUNTER — IP HISTORICAL/CONVERTED ENCOUNTER (OUTPATIENT)
Dept: OTHER | Age: 68
End: 2018-04-25

## 2018-04-26 DIAGNOSIS — G35 MULTIPLE SCLEROSIS (HCC): ICD-10-CM

## 2018-04-26 DIAGNOSIS — G35 MS (MULTIPLE SCLEROSIS) (HCC): Primary | ICD-10-CM

## 2018-04-26 DIAGNOSIS — M79.2 NEUROPATHIC PAIN: ICD-10-CM

## 2018-04-26 RX ORDER — DIMETHYL FUMARATE 240 MG/1
1 CAPSULE ORAL 2 TIMES DAILY
Qty: 180 CAP | Refills: 1 | Status: SHIPPED | OUTPATIENT
Start: 2018-04-26 | End: 2019-02-20 | Stop reason: SDUPTHER

## 2018-04-26 NOTE — TELEPHONE ENCOUNTER
Received refill requests (not sure if it's auto refill). Ask pt if she needs refills of her Gabapentin 600 mg tablet or the Clonazepam 0.5 mg tablets. Thanks.

## 2018-05-02 RX ORDER — CLONAZEPAM 0.5 MG/1
0.5 TABLET ORAL
Qty: 90 TAB | Refills: 0 | OUTPATIENT
Start: 2018-05-02

## 2018-05-02 RX ORDER — GABAPENTIN 600 MG/1
TABLET ORAL
Qty: 135 TAB | Refills: 0 | OUTPATIENT
Start: 2018-05-02

## 2018-05-09 LAB
BASOPHILS # BLD AUTO: 0.1 X10E3/UL (ref 0–0.2)
BASOPHILS NFR BLD AUTO: 1 %
BUN SERPL-MCNC: 18 MG/DL (ref 8–27)
BUN/CREAT SERPL: 27 (ref 12–28)
CALCIUM SERPL-MCNC: 9.7 MG/DL (ref 8.7–10.3)
CHLORIDE SERPL-SCNC: 105 MMOL/L (ref 96–106)
CO2 SERPL-SCNC: 26 MMOL/L (ref 18–29)
CREAT SERPL-MCNC: 0.67 MG/DL (ref 0.57–1)
EOSINOPHIL # BLD AUTO: 0.1 X10E3/UL (ref 0–0.4)
EOSINOPHIL NFR BLD AUTO: 3 %
ERYTHROCYTE [DISTWIDTH] IN BLOOD BY AUTOMATED COUNT: 17.6 % (ref 12.3–15.4)
GFR SERPLBLD CREATININE-BSD FMLA CKD-EPI: 107 ML/MIN/1.73
GFR SERPLBLD CREATININE-BSD FMLA CKD-EPI: 93 ML/MIN/1.73
GLUCOSE SERPL-MCNC: 264 MG/DL (ref 65–99)
HCT VFR BLD AUTO: 37.2 % (ref 34–46.6)
HGB BLD-MCNC: 11.6 G/DL (ref 11.1–15.9)
IMM GRANULOCYTES # BLD: 0 X10E3/UL (ref 0–0.1)
IMM GRANULOCYTES NFR BLD: 0 %
LYMPHOCYTES # BLD AUTO: 1.5 X10E3/UL (ref 0.7–3.1)
LYMPHOCYTES NFR BLD AUTO: 31 %
MCH RBC QN AUTO: 25.3 PG (ref 26.6–33)
MCHC RBC AUTO-ENTMCNC: 31.2 G/DL (ref 31.5–35.7)
MCV RBC AUTO: 81 FL (ref 79–97)
MONOCYTES # BLD AUTO: 0.4 X10E3/UL (ref 0.1–0.9)
MONOCYTES NFR BLD AUTO: 8 %
NEUTROPHILS # BLD AUTO: 2.7 X10E3/UL (ref 1.4–7)
NEUTROPHILS NFR BLD AUTO: 57 %
PLATELET # BLD AUTO: 244 X10E3/UL (ref 150–379)
POTASSIUM SERPL-SCNC: 4.4 MMOL/L (ref 3.5–5.2)
RBC # BLD AUTO: 4.58 X10E6/UL (ref 3.77–5.28)
SODIUM SERPL-SCNC: 146 MMOL/L (ref 134–144)
WBC # BLD AUTO: 4.7 X10E3/UL (ref 3.4–10.8)

## 2018-05-11 NOTE — PROCEDURES
EMG/ NCS Report  DRUG REHABILITATION  - DAY ONE RESIDENCE  [facility] [place]  [facility], [address] [place]    Ph: [phone]   FAX: [phone]/ [phone]  Test Date:  2018    Patient: Graciela Judd : 1950 Physician: Jacquelyn Knowles M.D.   ID#: [id number] SEX: Female Ref. Phys: Jacquelyn Knowles M.D. Patient History:  CC: left arm pain; follow up to previous EMG/ NCS done on 18, to evaluate for radial neuropathy. Check bilateral radial sensory and motor NCS. NCV Findings:  Evaluation of the left radial motor and the right radial motor nerves showed normal distal onset latency (L0.9, R2.0 ms), normal amplitude (L2.4, R2.7 mV), and normal conduction velocity (Up Arm-Elbow, L50, R52 m/s). The left radial sensory and the right radial sensory nerves showed normal distal peak latency (L2.4, R2.1 ms) and normal amplitude (L11.0, R26.1 µV). Impressions: Moderately reduced amplitude (> 50% reduction) of left radial sensory response compared to right but left radial motor onset latency is faster (not slower) right side so rules out left radial neuropathy.       ___________________________  Jacquelyn Knowles M.D.    Nerve Conduction Studies  Anti Sensory Summary Table     Stim Site NR Peak (ms) Norm Peak (ms) P-T Amp (µV) Norm P-T Amp Site1 Site2 Dist (cm)   Left Radial Anti Sensory (Base 1st Digit)  31°C   Wrist    2.4 <2.8 11.0 >11 Wrist Base 1st Digit 10.0   Right Radial Anti Sensory (Base 1st Digit)  34.5°C   Wrist    2.1 <2.8 26.1 >11 Wrist Base 1st Digit 10.0   Site 2    2.1  24.9         Motor Summary Table     Stim Site NR Onset (ms) Norm Onset (ms) O-P Amp (mV) Norm O-P Amp Amp (Prev) (%) Site1 Site2 Dist (cm) Jimmie (m/s) Norm Jimmie (m/s)   Left Radial Motor (Ext Ind Prop)  30.1°C   4cm    0.9 <2.0 2.4 >2.0 100.0 Elbow 4cm 0.0  >50   Elbow    3.5  2.2  91.7 Up Arm Elbow 12.0 50 >50   Up Arm    5.9  2.0  90.9        Right Radial Motor (Ext Ind Prop)  32.4°C   4cm 2. 0 <2.0 2.7 >2.0 100.0 Elbow 4cm 0.0  >50   Elbow    4.7  2.5  92.6 Up Arm Elbow 12.0 52 >50   Up Arm    7.0  2.4  96.0            Waveforms:

## 2018-05-15 ENCOUNTER — TELEPHONE (OUTPATIENT)
Dept: NEUROLOGY | Age: 68
End: 2018-05-15

## 2018-05-15 NOTE — TELEPHONE ENCOUNTER
----- Message from Ana Gomez MD sent at 5/13/2018 10:25 AM EDT -----  Let pt know that white blood cell counts are normal but her blood sugars are high (264); if not dx with diabetes, she should follow up with PCP to discuss why sugars are high.

## 2018-05-15 NOTE — TELEPHONE ENCOUNTER
Contacted patient and informed her of blood work. Patient voiced understanding and will call back if any further questions or concerns.

## 2018-06-11 ENCOUNTER — OFFICE VISIT (OUTPATIENT)
Dept: NEUROLOGY | Age: 68
End: 2018-06-11

## 2018-06-11 VITALS
BODY MASS INDEX: 30.96 KG/M2 | HEART RATE: 81 BPM | DIASTOLIC BLOOD PRESSURE: 80 MMHG | HEIGHT: 61 IN | SYSTOLIC BLOOD PRESSURE: 140 MMHG | WEIGHT: 164 LBS | RESPIRATION RATE: 20 BRPM | OXYGEN SATURATION: 96 %

## 2018-06-11 DIAGNOSIS — R25.2 MUSCLE CRAMPING: ICD-10-CM

## 2018-06-11 DIAGNOSIS — G35 MULTIPLE SCLEROSIS (HCC): Primary | ICD-10-CM

## 2018-06-11 DIAGNOSIS — M79.2 NEUROPATHIC PAIN: ICD-10-CM

## 2018-06-11 RX ORDER — DULOXETIN HYDROCHLORIDE 60 MG/1
CAPSULE, DELAYED RELEASE ORAL
Refills: 3 | COMMUNITY
Start: 2018-05-27 | End: 2018-11-20

## 2018-06-11 RX ORDER — CLOTRIMAZOLE AND BETAMETHASONE DIPROPIONATE 10; .64 MG/G; MG/G
CREAM TOPICAL
Refills: 0 | COMMUNITY
Start: 2018-06-05 | End: 2019-09-05

## 2018-06-11 RX ORDER — AMLODIPINE BESYLATE 10 MG/1
TABLET ORAL
Refills: 3 | COMMUNITY
Start: 2018-05-09 | End: 2020-07-30

## 2018-06-11 RX ORDER — CLONAZEPAM 0.5 MG/1
0.5 TABLET ORAL AS NEEDED
Refills: 2 | COMMUNITY
Start: 2018-05-29 | End: 2018-09-19 | Stop reason: SDUPTHER

## 2018-06-11 NOTE — MR AVS SNAPSHOT
[address] [place] [address] [facility] [address] 
[phone] Patient: Nayeli Aldana MRN: [id number] IJU:2[birth year] Visit Information Date & Time Provider Department Dept. Phone Encounter #  
 6/11/2018 11:40 AM Reji Cole  [facility] [phone] [id number] Follow-up Instructions Return in about 3 months (around 9/11/2018). Upcoming Health Maintenance Date Due Hepatitis C Screening 1950 DTaP/Tdap/Td series (1 - Tdap) 9/19/1973 BREAST CANCER SCRN MAMMOGRAM 9/19/2002 FOBT Q 1 YEAR AGE 50-75 9/19/2002 ZOSTER VACCINE AGE 60> 7/19/2012 GLAUCOMA SCREENING Q2Y 9/19/2017 Bone Densitometry (Dexa) Screening 9/19/2017 Pneumococcal 65+ Low/Medium Risk (1 of 2 - PCV13) 9/19/2017 Influenza Age 5 to Adult 8/1/2018 Allergies as of 6/11/2018  Review Complete On: 4/10/2018 By: Enrique Sood LPN Severity Noted Reaction Type Reactions Iodinated Contrast- Oral And Iv Dye High 04/23/2015    Anaphylaxis Current Immunizations  Never Reviewed No immunizations on file. Not reviewed this visit You Were Diagnosed With   
  
 Codes Comments Multiple sclerosis ([facility]    -  Primary ICD-10-CM: G35 
ICD-9-CM: 970 Neuropathic pain     ICD-10-CM: M79.2 ICD-9-CM: 729.2 Muscle cramping     ICD-10-CM: R25.2 ICD-9-CM: 729.82 Vitals BP Pulse Resp Height(growth percentile) Weight(growth percentile) SpO2  
 140/80 81 20 5' 1\" (1.549 m) 164 lb (74.4 kg) 96% BMI OB Status Smoking Status 30.99 kg/m2 Hysterectomy Never Smoker BMI and BSA Data Body Mass Index Body Surface Area 30.99 kg/m 2 1.79 m 2 Preferred Pharmacy Pharmacy Name Phone [facility], [address] [place] [address] [phone] Your Updated Medication List  
  
   
 This list is accurate as of 6/11/18 12:12 PM.  Always use your most recent med list.  
  
  
  
  
 ADVAIR DISKUS 250-50 mcg/dose diskus inhaler Generic drug:  fluticasone-salmeterol Take 1 Puff by inhalation every twelve (12) hours. * amLODIPine 5 mg tablet Commonly known as:  Jayme Batistas Take 5 mg by mouth daily. * amLODIPine 10 mg tablet Commonly known as:  Jayme Batistas TK 1 T PO QD  
  
 cholestyramine-sucrose 4 gram powder MIX AND DRINK 4 GRAMS PO BID  
  
 clonazePAM 0.5 mg tablet Commonly known as:  KlonoPIN  
  
 clotrimazole-betamethasone topical cream  
Commonly known as:  LOTRISONE  
ARTURO EXT AA BID  
  
 dimethyl fumarate 240 mg Cpdr  
Commonly known as:  Karma Bobby Take 1 Cap by mouth two (2) times a day. Indications: relapsing form of multiple sclerosis DULoxetine 60 mg capsule Commonly known as:  CYMBALTA TK ONE C PO  QD.  
  
 furosemide 20 mg tablet Commonly known as:  LASIX TK 1 T PO QD PRN  
  
 gabapentin 600 mg tablet Commonly known as:  NEURONTIN  
TAKE HALF TABLET 8 AM,NOON AND 6PM.  
  
 hydroCHLOROthiazide 12.5 mg capsule Commonly known as:  Bill Knights TK 1 C PO D  
  
 loperamide 2 mg capsule Commonly known as:  IMODIUM TK ONE C PO  BID PRN  
  
 meloxicam 15 mg tablet Commonly known as:  MOBIC Take 15 mg by mouth daily. metoprolol succinate 50 mg XL tablet Commonly known as:  TOPROL-XL Take  by mouth daily. montelukast 10 mg tablet Commonly known as:  SINGULAIR TK 1 T PO  D  
  
 omeprazole 20 mg capsule Commonly known as:  PRILOSEC Take 20 mg by mouth daily. potassium chloride 20 mEq tablet Commonly known as:  K-DUR, KLOR-CON TK 1 T PO  DAILY PRN. TAKE WITH LASIX PROAIR HFA 90 mcg/actuation inhaler Generic drug:  albuterol Take  by inhalation. SPIRIVA WITH HANDIHALER 18 mcg inhalation capsule Generic drug:  tiotropium Take 1 Cap by inhalation daily. * Notice: This list has 2 medication(s) that are the same as other medications prescribed for you. Read the directions carefully, and ask your doctor or other care provider to review them with you. Follow-up Instructions Return in about 3 months (around 9/11/2018). Patient Instructions PRESCRIPTION REFILL POLICY [facility] Statement to Patients April 1, 2014 In an effort to ensure the large volume of patient prescription refills is processed in the most efficient and expeditious manner, we are asking our patients to assist us by calling your Pharmacy for all prescription refills, this will include also your  Mail Order Pharmacy. The pharmacy will contact our office electronically to continue the refill process. Please do not wait until the last minute to call your pharmacy. We need at least 48 hours (2days) to fill prescriptions. We also encourage you to call your pharmacy before going to  your prescription to make sure it is ready. With regard to controlled substance prescription refill requests (narcotic refills) that need to be picked up at our office, we ask your cooperation by providing us with at least 72 hours (3days) notice that you will need a refill. We will not refill narcotic prescription refill requests after 4:00pm on any weekday, Monday through Thursday, or after 2:00pm on Fridays, or on the weekends. We encourage everyone to explore another way of getting your prescription refill request processed using [employer], our patient web portal through our electronic medical record system. [employer] is an efficient and effective way to communicate your medication request directly to the office and  downloadable as an nadine on your smart phone . [employer] also features a review functionality that allows you to view your medication list as well as leave messages for your physician. Are you ready to get connected?  If so please review the attatched instructions or speak to any of our staff to get you set up right away! Thank you so much for your cooperation. Should you have any questions please contact our Practice Administrator. The Physicians and Staff,  [facility] Patient Instruction Plan/ Result Policy If we have ordered testing for you, know that; \"NO NEWS IS GOOD NEWS! \" It is our policy that we know longer call patients with results, nor do we  give test results over the phone. We schedule follow up appointments so that your results can be discussed in person. This allows you to address any questions you have regarding the results. If something of concern is revealed on your test, we will contact you to discuss the matter and if needed schedule a sooner follow up appointment. Additionally, results may be found by using the My Chart feature and one of our patient service representatives at the  can give you instructions on how to access this feature to utilize our electronic medical record system. Thank you for your understanding. Patient Instructions/Plans: For gabapentin 600mg tabs: Take 1/2 tab in AM, 1/2 at noon, and 1 tab at night for two weeks then if needed Take 1 tab in AM, 1/2 at noon, and 1 tab at night for two weeks then if needed Take 1 tab three times aday. Introducing [facility] HE[facility] SERVICES! Jaci Ruano introduces [employer] patient portal. Now you can access parts of your medical record, email your doctor's office, and request medication refills online. 1. In your internet browser, go to [email or web address] 2. Click on the First Time User? Click Here link in the Sign In box. You will see the New Member Sign Up page. 3. Enter your [employer] Access Code exactly as it appears below. You will not need to use this code after youve completed the sign-up process. If you do not sign up before the expiration date, you must request a new code. · [employer] Access Code: [email or web address] Expires: 9/9/2018 12:12 PM 
 
4. Enter the last four digits of your Social Security Number (xxxx) and Date of Birth (mm/dd/yyyy) as indicated and click Submit. You will be taken to the next sign-up page. 5. Create a [employer] ID. This will be your [employer] login ID and cannot be changed, so think of one that is secure and easy to remember. 6. Create a [employer] password. You can change your password at any time. 7. Enter your Password Reset Question and Answer. This can be used at a later time if you forget your password. 8. Enter your e-mail address. You will receive e-mail notification when new information is available in [address] 9. Click Sign Up. You can now view and download portions of your medical record. 10. Click the Download Summary menu link to download a portable copy of your medical information. If you have questions, please visit the Frequently Asked Questions section of the [employer] website. Remember, [employer] is NOT to be used for urgent needs. For medical emergencies, dial 911. Now available from your iPhone and Android! Please provide this summary of care documentation to your next provider. Your primary care clinician is listed as Phys Other. If you have any questions after today's visit, please call [phone].

## 2018-06-11 NOTE — PROGRESS NOTES
Chief Complaint   Patient presents with    Spasms     back     1. Have you been to the ER, urgent care clinic since your last visit? Hospitalized since your last visit? No    2. Have you seen or consulted any other health care providers outside of the [address] [place] [address] since your last visit? Include any pap smears or colon screening.  No     Visit Vitals    /80    Pulse 81    Resp 20    Ht 5' 1\" (1.549 m)    Wt 74.4 kg (164 lb)    SpO2 96%    BMI 30.99 kg/m2

## 2018-06-11 NOTE — PATIENT INSTRUCTIONS
10 [facility]   Statement to Patients  April 1, 2014      In an effort to ensure the large volume of patient prescription refills is processed in the most efficient and expeditious manner, we are asking our patients to assist us by calling your Pharmacy for all prescription refills, this will include also your  Mail Order Pharmacy. The pharmacy will contact our office electronically to continue the refill process. Please do not wait until the last minute to call your pharmacy. We need at least 48 hours (2days) to fill prescriptions. We also encourage you to call your pharmacy before going to  your prescription to make sure it is ready. With regard to controlled substance prescription refill requests (narcotic refills) that need to be picked up at our office, we ask your cooperation by providing us with at least 72 hours (3days) notice that you will need a refill. We will not refill narcotic prescription refill requests after 4:00pm on any weekday, Monday through Thursday, or after 2:00pm on Fridays, or on the weekends. We encourage everyone to explore another way of getting your prescription refill request processed using [employer], our patient web portal through our electronic medical record system. [employer] is an efficient and effective way to communicate your medication request directly to the office and  downloadable as an nadine on your smart phone . [employer] also features a review functionality that allows you to view your medication list as well as leave messages for your physician. Are you ready to get connected? If so please review the attatched instructions or speak to any of our staff to get you set up right away! Thank you so much for your cooperation. Should you have any questions please contact our Practice Administrator.     The Physicians and Staff,  [facility]   Patient Instruction Plan/ Result Policy    If we have ordered testing for you, know that; \"NO NEWS IS GOOD NEWS! \" It is our policy that we know longer call patients with results, nor do we  give test results over the phone. We schedule follow up appointments so that your results can be discussed in person. This allows you to address any questions you have regarding the results. If something of concern is revealed on your test, we will contact you to discuss the matter and if needed schedule a sooner follow up appointment. Additionally, results may be found by using the My Chart feature and one of our patient service representatives at the  can give you instructions on how to access this feature to utilize our electronic medical record system. Thank you for your understanding. Patient Instructions/Plans: For gabapentin 600mg tabs: Take 1/2 tab in AM, 1/2 at noon, and 1 tab at night for two weeks then if needed  Take 1 tab in AM, 1/2 at noon, and 1 tab at night for two weeks then if needed  Take 1 tab three times aday.

## 2018-06-11 NOTE — PROGRESS NOTES
Interval HPI: This is a 74 y.o. female who is following up for MS    Chief Complaint   Patient presents with    Spasms     back     Interval Hx    She has a history of MS. She also has some muscle spasms that are severe and her biggest concern today. She is a new patient for me today. She normally follows with Dr. Zaynab Schaefer. She has been seen by me due to coming to the wrong location. She has recently become homeless and is sleeping on her son's couch. This has given her a lot of stress. She is on disability. She uses her son's address for her meds. She is also getting severe spasms. She gets this in her back. This happens from her neck to her tailbone. Pain will be severe for 4-5 seconds and then can reoccur. Bending down will trigger it. She also can't stand for long periods of time. Dr. Zaynab Schaefer tried to increase her gabapentin 300mg TID. She feels like it helped at first but now isn't. She is not having any side effects. She does have DM. She has good control of this and her BP managed by her PCP. For MS she is on Tecfidera 240mg BID. She has had no side effects. No recent flares. Lab work done in May was stable. Reviewed imaging patient had in January. No increase in white matter disease. She does have some cervical stenosis. She also follows with GI for hepatitis C. This is stable. Her liver enzymes are doing well. Recap:   Pt called back on 3-22-18 saying the pain she had in her neck radiating down her spine (complained of it before) was severe and not relieved with Gabapentin 200 mg TID. She had been taking Clonazepam 0.5 mg QHS and reported that it helped with insomnia and leg spasms. Instructed her to increase Gabapentin to 300 mg, three times a day then fill new Rx for Gabapentin 600 mg HALF tablet TID. Also sent in prednisone taper (6 days) in case some of the pain was coming from her known cervical ddd.  Advised her that if pain wasn't controlled, to increase her Clonazepam to two 0.5 mg tabs at bedtime. Says since increasing the gabapentin to 300 mg TID not having as many episodes of severe pain radiating down spine, not as intense as they were. Not happening daily, now a few days a week. No SEFx (weight gain, fluid retention, or other symptoms) from increasing gabapentin that she can tell. Still taking Clonazepam 0.5 mg one tab QHS, occasionally 2 tabs QHS. 2) Left arm radicular pain has resolved  3) Muscle spasms controlled with the Klonopin. Tried/ failed: baclofen, flexeril  4) Multiple sclerosis  Denies any recent MS flares/ exacerbations. No interval episodes of vision loss, eye pain, or extremity weakness. Says she loses her balance more easily. Continues taking Tecfidera 240 mg BID. Reports having recent labs done with Hepatologist (for Hx of Chronic Hep C). Chronic MS symptoms: gait unsteadiness/ falls, bladder urgency, bowel urgency , spasticity (treated in past with baclofen), \"mini-seizure\" (episodes where she \"spaces out\", EEG in past normal, Keppra d/c'd, no recent spells)     MS Flare Hx:  : Pseudo-exacerbation due to UTI      Brief ROS: as above  There have been no significant changes in PMHx, PSHx, SHx except as noted above.     =====================================  Brief Hx:   74 y.o. female with MS, Hep C, dx with MS around 3190-1305 while living in [facility]. Initial symptoms was numbness in legs, unsteady gait, speech difficulty. Recalls having MRI Brain and LP that were abnormal. Tried Rebif, Avonex, Copaxone, and Betaseron in the past, but she's not sure why they were discontinued. She denies having any side effects to any of the injections, she's not sure if they were changed because of disease progression or relapses. She recalls being hospitalized in March of 2014 for an MS relapse (confirmed by MRI Brain, and ? C-spine) and getting a course of IV solumedrol.  The last MS med she was started on was Tecfidera and says no problems with that. Prior testing  EEG: normal awake/ drowsy. MRI Brain 3-2016: no significant changes from prior MRI Brain. MRI Brain 5-13-15: moderate white matter lesion burden consistent with Multiple Sclerosis. MRI C-spine 5-13-15: no cord lesions; moderate stenosis at C5-6. MRI T-spine done in 11/ 2013 (outside facility; see media section) didn't show any spinal stenosis or cord lesions. NMO antibodies were negative. MRI Brain (+/- contrast) done at [facility] on 2-23-17. They didn't have a prior MRI to compare it to (compared to a prior CT head) but report indicates scattered areas of T2 hyperintensity consistent with hx of MS, no abnormal enhancement. EMG (left leg) for c/o left lateral leg numbness, tingling. Shows a mild, chronic left L5 radiculopathy. No electrodiagnostic evidence of typical length-dependent peripheral neuropathy, thought that doesn't exclude possibility of small fiber neuropathy. Consider skin-punch biopsy if indicated. EMG (left arm) for c/o left arm pain: no CTS, ulnar neuropathy, or cervical radiculopathy. Repeated MRI C-spine as prior study was > 1 year ago (and had showed disc bulge/ extrusion at C5-6 causing bilateral foraminal narrowing). The updated MRI C-spine shows mild disc-osteophyte complex with mild central stenosis and mild narrowing of neural foramina at C5-6, similar to prior study. No other significant abnormalites of C-spine, no lesions in spinal cord.      Allergies   Allergen Reactions    Iodinated Contrast- Oral And Iv Dye Anaphylaxis     Current Outpatient Prescriptions   Medication Sig Dispense Refill    amLODIPine (NORVASC) 10 mg tablet TK 1 T PO QD  3    clonazePAM (KLONOPIN) 0.5 mg tablet   2    clotrimazole-betamethasone (LOTRISONE) topical cream ARTURO EXT AA BID  0    DULoxetine (CYMBALTA) 60 mg capsule TK ONE C PO  QD.  3    gabapentin (NEURONTIN) 600 mg tablet TAKE HALF TABLET 8 AM,NOON AND 6PM. 90 Tab 0    dimethyl fumarate (TECFIDERA) 240 mg cpDR Take 1 Cap by mouth two (2) times a day. Indications: relapsing form of multiple sclerosis 180 Cap 1    meloxicam (MOBIC) 15 mg tablet Take 15 mg by mouth daily.  cholestyramine-sucrose 4 gram powder MIX AND DRINK 4 GRAMS PO BID  0    tiotropium (SPIRIVA WITH HANDIHALER) 18 mcg inhalation capsule Take 1 Cap by inhalation daily.  montelukast (SINGULAIR) 10 mg tablet TK 1 T PO  D  3    hydroCHLOROthiazide (MICROZIDE) 12.5 mg capsule TK 1 C PO D  5    loperamide (IMODIUM) 2 mg capsule TK ONE C PO  BID PRN  0    omeprazole (PRILOSEC) 20 mg capsule Take 20 mg by mouth daily.  albuterol (PROAIR HFA) 90 mcg/actuation inhaler Take  by inhalation.  fluticasone-salmeterol (ADVAIR DISKUS) 250-50 mcg/dose diskus inhaler Take 1 Puff by inhalation every twelve (12) hours.  metoprolol succinate (TOPROL-XL) 50 mg XL tablet Take  by mouth daily.  furosemide (LASIX) 20 mg tablet TK 1 T PO QD PRN  3    potassium chloride (K-DUR, KLOR-CON) 20 mEq tablet TK 1 T PO  DAILY PRN. TAKE WITH LASIX  0    amLODIPine (NORVASC) 5 mg tablet Take 5 mg by mouth daily. Physical Exam  Blood pressure 140/80, pulse 81, resp. rate 20, height 5' 1\" (1.549 m), weight 74.4 kg (164 lb), SpO2 96 %. No acute distress, appears comfortable  Neck: no stiffness  Exts: no edema    Focused Neurological Exam     Mental status: Alert and oriented to person, place situation. Language: normal fluency and comprehension; no dysarthria. CNs:   Visual fields grossly normal  Extraocular movements intact, no nystagmus  Face appears symmetric and facial strength normal.    Hearing is intact to casual conversation.      Sensory: intact LT in both legs  Motor: 5/5 in arms, 4+/ 5 in legs (reduced muscle mass)    Reflexes:  2+ patellars    Gait: mild unsteady (unchanged)    Impression      74 y.o. yo female with MS, Hepatitis C (followed by GI/ hepatology)    1) Multiple Sclerosis  No recent exacerbations. MRI Brain and C-spine in Jan 2018 show stable CNS lesion burden compared to prior studies in March 2016. Continue on Tecfidera 240 mg one tab BID. Reviewed labs and they are stable. Will repeat in 3 months    2) Leg cramping, insomnia  Continue Klonopin 0.5 mg QHS     3) Pain radiating down spine/ neuropathic pain progressing  Schedule given to increase gabapentin to 300/300/600 and continue as needed up to 600mg three times a day    4) Left arm radicular pain (resolved)  Normal left upper extremity EMG. MRI C-spine with disc bulge-osteophyte complex at C5-6, unchanged compared to last scan    5) F/u in 3 months      Signed By: Elizabeth Oseguera MD     June 11, 2018      Medications and side effects discussed with patient in detail. With any new medications prescribed, patient was given instructions on administration and side effects. Written medication information was provided to the patient as well. This note was created using voice recognition software. Despite editing, there may be syntax errors. This note will not be viewable in [address]

## 2018-07-23 ENCOUNTER — TELEPHONE (OUTPATIENT)
Dept: NEUROLOGY | Age: 68
End: 2018-07-23

## 2018-07-23 NOTE — TELEPHONE ENCOUNTER
----- Message from Lisa Urbano sent at 7/23/2018  2:51 PM EDT -----  Regarding: Dr. Leopold Goring request  Pt would like a call back from the nurse regarding gabapentin. Pt is currently taking it 3 times a day,but it does not seem to working. Pt would like to know what to do. Pt is running out of medication and will need a refill soon. Pt is now using the [facility]  pharmacy located at [facility] (p) [phone]. Pt can be reached at [phone].

## 2018-07-24 NOTE — TELEPHONE ENCOUNTER
----- Message from [facility] sent at 7/24/2018  2:17 PM EDT -----  Regarding: Dr. Pawel Patel telephone  The pt returned the call she just missed.       Best contact number is [phone]

## 2018-08-16 DIAGNOSIS — M79.2 NEUROPATHIC PAIN: ICD-10-CM

## 2018-08-16 NOTE — TELEPHONE ENCOUNTER
----- Message from Francia Quinonez sent at 8/16/2018 10:23 AM EDT -----  Regarding: Dr Renne Kussmaul (p) [phone], pt said  The increased dosage of the 300 mg of Gabapentin, is not helping her back , she is still having sever back pain, she would like to know if she should try something else instead of getting a refill

## 2018-08-16 NOTE — TELEPHONE ENCOUNTER
Let pt know that Gabapentin can go up to 1200 mg three times a day. The alternative would be trying Lyrica but I'm not sure if her insurance will approve it. If she wants to try Lyrica instead of going up on Gabapentin, let me know.

## 2018-08-16 NOTE — TELEPHONE ENCOUNTER
Contacted patient. She states she is taking gabapentin 600 mg TID. It is not helping her pain. She is currently out of the mediation and does not want to fill it if theres an alternative that may help. Please advise.

## 2018-08-17 RX ORDER — GABAPENTIN 600 MG/1
1200 TABLET ORAL 3 TIMES DAILY
Qty: 180 TAB | Refills: 1 | Status: SHIPPED | OUTPATIENT
Start: 2018-08-17 | End: 2018-09-11

## 2018-09-11 ENCOUNTER — OFFICE VISIT (OUTPATIENT)
Dept: NEUROLOGY | Age: 68
End: 2018-09-11

## 2018-09-11 VITALS
BODY MASS INDEX: 30.21 KG/M2 | OXYGEN SATURATION: 98 % | DIASTOLIC BLOOD PRESSURE: 92 MMHG | HEART RATE: 88 BPM | WEIGHT: 160 LBS | SYSTOLIC BLOOD PRESSURE: 150 MMHG | HEIGHT: 61 IN

## 2018-09-11 DIAGNOSIS — M79.2 NEUROPATHIC PAIN: ICD-10-CM

## 2018-09-11 DIAGNOSIS — G35 MULTIPLE SCLEROSIS (HCC): Primary | ICD-10-CM

## 2018-09-11 RX ORDER — LISINOPRIL 40 MG/1
40 TABLET ORAL DAILY
COMMUNITY
End: 2020-12-22 | Stop reason: SDUPTHER

## 2018-09-11 RX ORDER — NORTRIPTYLINE HYDROCHLORIDE 25 MG/1
CAPSULE ORAL
Qty: 60 CAP | Refills: 0 | Status: SHIPPED | OUTPATIENT
Start: 2018-09-11 | End: 2018-09-19 | Stop reason: SDUPTHER

## 2018-09-11 NOTE — MR AVS SNAPSHOT
[address] [place] [address] 
[phone] Patient: Maryanne Shields MRN: [id number] TKE:[birth year] Visit Information Date & Time Provider Department Dept. Phone Encounter #  
 9/11/2018  1:20 PM Jake Mulligna MD [address] [place] [address] Neurology Clinic [phone] [id number] Follow-up Instructions Return in about 2 months (around 11/11/2018). Upcoming Health Maintenance Date Due Hepatitis C Screening 1950 DTaP/Tdap/Td series (1 - Tdap) 9/19/1973 BREAST CANCER SCRN MAMMOGRAM 9/19/2002 FOBT Q 1 YEAR AGE 50-75 9/19/2002 ZOSTER VACCINE AGE 60> 7/19/2012 GLAUCOMA SCREENING Q2Y 9/19/2017 Bone Densitometry (Dexa) Screening 9/19/2017 Pneumococcal 65+ Low/Medium Risk (1 of 2 - PCV13) 9/19/2017 Influenza Age 5 to Adult 8/1/2018 Allergies as of 9/11/2018  Review Complete On: 9/11/2018 By: Gerson Gibbs LPN Severity Noted Reaction Type Reactions Iodinated Contrast- Oral And Iv Dye High 04/23/2015    Anaphylaxis Current Immunizations  Never Reviewed No immunizations on file. Not reviewed this visit Vitals BP Pulse Height(growth percentile) Weight(growth percentile) SpO2 BMI  
 (!) 150/92 88 5' 1\" (1.549 m) 160 lb (72.6 kg) 98% 30.23 kg/m2 OB Status Smoking Status Hysterectomy Never Smoker BMI and BSA Data Body Mass Index Body Surface Area  
 30.23 kg/m 2 1.77 m 2 Preferred Pharmacy Pharmacy Name Phone [facility], [address] AT [facility]  [facility] [phone] Your Updated Medication List  
  
   
This list is accurate as of 9/11/18  1:57 PM.  Always use your most recent med list.  
  
  
  
  
 Alyx Reeves 250-50 mcg/dose diskus inhaler Generic drug:  fluticasone-salmeterol Take 1 Puff by inhalation every twelve (12) hours. amLODIPine 10 mg tablet Commonly known as:  Alexis Haven TK 1 T PO QD  
  
 CALCIUM 600 + D 600-125 mg-unit Tab Generic drug:  calcium-cholecalciferol (d3) Take  by mouth. cholestyramine-sucrose 4 gram powder MIX AND DRINK 4 GRAMS PO BID  
  
 clonazePAM 0.5 mg tablet Commonly known as:  Lori Slay Take 0.5 mg by mouth as needed. clotrimazole-betamethasone topical cream  
Commonly known as:  LOTRISONE  
ARTURO EXT AA BID  
  
 dimethyl fumarate 240 mg Cpdr  
Commonly known as:  Olivia Manna Take 1 Cap by mouth two (2) times a day. Indications: relapsing form of multiple sclerosis DULoxetine 60 mg capsule Commonly known as:  CYMBALTA TK ONE C PO  QD.  
  
 furosemide 20 mg tablet Commonly known as:  LASIX TK 1 T PO QD PRN HumuLIN 70/30 U-100 Insulin 100 unit/mL (70-30) injection Generic drug:  insulin NPH/insulin regular 22 Units by SubCUTAneous route Before breakfast and dinner. hydroCHLOROthiazide 12.5 mg capsule Commonly known as:  Augusta Dense TK 1 C PO D  
  
 lisinopril 40 mg tablet Commonly known as:  Nonah Sheridan Take 40 mg by mouth daily. loperamide 2 mg capsule Commonly known as:  IMODIUM TK ONE C PO  BID PRN  
  
 meloxicam 15 mg tablet Commonly known as:  MOBIC Take 15 mg by mouth daily. metoprolol succinate 50 mg XL tablet Commonly known as:  TOPROL-XL Take  by mouth daily. montelukast 10 mg tablet Commonly known as:  SINGULAIR TK 1 T PO  D  
  
 omeprazole 20 mg capsule Commonly known as:  PRILOSEC Take 20 mg by mouth daily. potassium chloride 20 mEq tablet Commonly known as:  K-DUR, KLOR-CON TK 1 T PO  DAILY PRN. TAKE WITH LASIX PROAIR HFA 90 mcg/actuation inhaler Generic drug:  albuterol Take  by inhalation. SPIRIVA WITH HANDIHALER 18 mcg inhalation capsule Generic drug:  tiotropium Take 1 Cap by inhalation daily. Follow-up Instructions Return in about 2 months (around 11/11/2018). Patient Instructions 1. High-dose gabapentin hasn't helped your neck pain (radiating down your spine). Reduce it to one tablet three times a day x 1 week, then HALF tablet 3 times a day x 1 week, then stop. 2. Start taking Nortriptyline 25 mg tablet (or capsule), one tablet at bedtime. If after 2 weeks, you're not noticing reduction in your pain down your neck, then start taking 2 nortriptyline tablets at bedtime. Common side effects: sleepiness, drowsiness, dry mouth. 3. Call me back in 4 weeks and let me know if the nortriptyline is working (for the leg cramps too). If not, we'll discuss either increasing it or changing you back to Clonazepam at bedtime. 4. Follow up in 2 months Introducing [facility]! [employer] introduces Powered Now patient portal. Now you can access parts of your medical record, email your doctor's office, and request medication refills online. 1. In your internet browser, go to [email or web address] 2. Click on the First Time User? Click Here link in the Sign In box. You will see the New Member Sign Up page. 3. Enter your Powered Now Access Code exactly as it appears below. You will not need to use this code after youve completed the sign-up process. If you do not sign up before the expiration date, you must request a new code. · Powered Now Access Code: [email or web address] Expires: 12/10/2018  1:57 PM 
 
4. Enter the last four digits of your Social Security Number (xxxx) and Date of Birth (mm/dd/yyyy) as indicated and click Submit. You will be taken to the next sign-up page. 5. Create a [employer]t ID. This will be your Powered Now login ID and cannot be changed, so think of one that is secure and easy to remember. 6. Create a Powered Now password. You can change your password at any time. 7. Enter your Password Reset Question and Answer.  This can be used at a later time if you forget your password. 8. Enter your e-mail address. You will receive e-mail notification when new information is available in [address] 9. Click Sign Up. You can now view and download portions of your medical record. 10. Click the Download Summary menu link to download a portable copy of your medical information. If you have questions, please visit the Frequently Asked Questions section of the [employer] website. Remember, [employer] is NOT to be used for urgent needs. For medical emergencies, dial 911. Now available from your iPhone and Android! Please provide this summary of care documentation to your next provider. Your primary care clinician is listed as Suraj Lucas. If you have any questions after today's visit, please call [phone].

## 2018-09-11 NOTE — PATIENT INSTRUCTIONS
1. High-dose gabapentin hasn't helped your neck pain (radiating down your spine). Reduce it to one tablet three times a day x 1 week, then HALF tablet 3 times a day x 1 week, then stop. 2. Start taking Nortriptyline 25 mg tablet (or capsule), one tablet at bedtime. If after 2 weeks, you're not noticing reduction in your pain down your neck, then start taking 2 nortriptyline tablets at bedtime. Common side effects: sleepiness, drowsiness, dry mouth. 3. Call me back in 4 weeks and let me know if the nortriptyline is working (for the leg cramps too). If not, we'll discuss either increasing it or changing you back to Clonazepam at bedtime.      4. Follow up in 2 months

## 2018-09-11 NOTE — PROGRESS NOTES
Spasms worse, even with increase of gabapentin 600 mg- 2 tab TID. She has been having pain in both of her feet and leg cramps.

## 2018-09-11 NOTE — PROGRESS NOTES
Interval HPI: This is a 79 y.o. female who is following up for MS    Chief Complaint   Patient presents with    Multiple Sclerosis    Pain (Chronic)     Interval Hx      4) Multiple sclerosis  Last CMP 5-8-18 with elevated blood sugars (260s); advised pt to d/w PCP  Last CBC 5-8-18 with normal WBC, H/h, and platelet count    Denies any recent MS flares/ exacerbations. No interval episodes of vision loss, eye pain, or extremity weakness. Says she loses her balance more easily. Continues taking Tecfidera 240 mg BID. Last MRI Brain and C-spine done in Jan 2018. Chronic MS symptoms: gait unsteadiness/ falls, bladder urgency, bowel urgency , spasticity (treated in past with baclofen), \"mini-seizure\" (episodes where she \"spaces out\", EEG in past normal, Keppra d/c'd, no recent spells)     MS Flare Hx:  : Pseudo-exacerbation due to UTI    2) Neck pain (neuropathic)  Daughter accompanies patient. Pt called back reporting increased pain shooting from neck down spine. Increased Gabapentin to 600 mg, two tabs three times a day. No noticeable side effects and pain isn't any better, pain feels worse to her. She continues to describe severe pain shooting from her neck down her spine. Can happen at any time, any position. She has Clonazepam 0.5 mg that she reports only taking occasionally, if she can't sleep. MRI C-spine showed disc-osteophyte complex, mild spinal stenosis at C5-6 (unchagned compared to prior), no spinal cord lesions. 3) Left arm radicular pain  Resolved. Normal left upper extremity EMG. MRI C-spine as noted above. 4) Muscle spasms/ cramping legs  Tried/ failed: baclofen, flexeril  Not taking the Klonopin as consistently as she was in the past  Not on muscle relaxer at this point      Brief ROS: as above    =====================================  Brief Hx:   79 y.o. female with MS, Hep C, dx with MS around [id number] while living in [facility].   Initial symptoms was numbness in legs, unsteady gait, speech difficulty. Recalls having MRI Brain and LP that were abnormal. Tried Rebif, Avonex, Copaxone, and Betaseron in the past, but she's not sure why they were discontinued. She denies having any side effects to any of the injections, she's not sure if they were changed because of disease progression or relapses. She recalls being hospitalized in March of 2014 for an MS relapse (confirmed by MRI Brain, and ? C-spine) and getting a course of IV solumedrol. The last MS med she was started on was Tecfidera and says no problems with that. Prior testing  EEG: normal awake/ drowsy. MRI Brain 3-2016: no significant changes from prior MRI Brain. MRI Brain 5-13-15: moderate white matter lesion burden consistent with Multiple Sclerosis. MRI C-spine 5-13-15: no cord lesions; moderate stenosis at C5-6. MRI T-spine done in 11/ 2013 (outside facility; see media section) didn't show any spinal stenosis or cord lesions. NMO antibodies were negative. MRI Brain (+/- contrast) done at [facility] on 2-23-17. They didn't have a prior MRI to compare it to (compared to a prior CT head) but report indicates scattered areas of T2 hyperintensity consistent with hx of MS, no abnormal enhancement. EMG (left leg) for c/o left lateral leg numbness, tingling. Shows a mild, chronic left L5 radiculopathy. No electrodiagnostic evidence of typical length-dependent peripheral neuropathy, thought that doesn't exclude possibility of small fiber neuropathy. Consider skin-punch biopsy if indicated. EMG (left arm) for c/o left arm pain: no CTS, ulnar neuropathy, or cervical radiculopathy. Repeated MRI C-spine as prior study was > 1 year ago (and had showed disc bulge/ extrusion at C5-6 causing bilateral foraminal narrowing). The updated MRI C-spine shows mild disc-osteophyte complex with mild central stenosis and mild narrowing of neural foramina at C5-6, similar to prior study.   No other significant abnormalites of C-spine, no lesions in spinal cord. Allergies   Allergen Reactions    Iodinated Contrast- Oral And Iv Dye Anaphylaxis     Current Outpatient Prescriptions   Medication Sig Dispense Refill    lisinopril (PRINIVIL, ZESTRIL) 40 mg tablet Take 40 mg by mouth daily.  calcium-cholecalciferol, d3, (CALCIUM 600 + D) 600-125 mg-unit tab Take  by mouth.  insulin NPH/insulin regular (HUMULIN 70/30 U-100 INSULIN) 100 unit/mL (70-30) injection 22 Units by SubCUTAneous route Before breakfast and dinner.  nortriptyline (PAMELOR) 25 mg capsule Week 1 and 2: one capsule at bedtime. Week 3 and after: 2 capsules at bedtime. Anti-depressant for neuropathic pain. 60 Cap 0    amLODIPine (NORVASC) 10 mg tablet TK 1 T PO QD  3    clonazePAM (KLONOPIN) 0.5 mg tablet Take 0.5 mg by mouth as needed. 2    clotrimazole-betamethasone (LOTRISONE) topical cream ARTURO EXT AA BID  0    DULoxetine (CYMBALTA) 60 mg capsule TK ONE C PO  QD.  3    dimethyl fumarate (TECFIDERA) 240 mg cpDR Take 1 Cap by mouth two (2) times a day. Indications: relapsing form of multiple sclerosis 180 Cap 1    furosemide (LASIX) 20 mg tablet TK 1 T PO QD PRN  3    meloxicam (MOBIC) 15 mg tablet Take 15 mg by mouth daily.  cholestyramine-sucrose 4 gram powder MIX AND DRINK 4 GRAMS PO BID  0    tiotropium (SPIRIVA WITH HANDIHALER) 18 mcg inhalation capsule Take 1 Cap by inhalation daily.  montelukast (SINGULAIR) 10 mg tablet TK 1 T PO  D  3    hydroCHLOROthiazide (MICROZIDE) 12.5 mg capsule TK 1 C PO D  5    loperamide (IMODIUM) 2 mg capsule TK ONE C PO  BID PRN  0    potassium chloride (K-DUR, KLOR-CON) 20 mEq tablet TK 1 T PO  DAILY PRN. TAKE WITH LASIX  0    omeprazole (PRILOSEC) 20 mg capsule Take 20 mg by mouth daily.  albuterol (PROAIR HFA) 90 mcg/actuation inhaler Take  by inhalation.  fluticasone-salmeterol (ADVAIR DISKUS) 250-50 mcg/dose diskus inhaler Take 1 Puff by inhalation every twelve (12) hours.       metoprolol succinate (TOPROL-XL) 50 mg XL tablet Take  by mouth daily. Past Medical History:   Diagnosis Date    Acid reflux     Arthritis     Asthma     Chronic obstructive pulmonary disease (HCC)     Diabetes ([address]     Headache     Hepatitis C     Hypertension     Seizures (HCC)     Sleep apnea        Past Surgical History:   Procedure Laterality Date    HX CHOLECYSTECTOMY      HX HYSTERECTOMY      HX TONSILLECTOMY         No family history on file. Social History     Social History    Marital status:      Spouse name: N/A    Number of children: N/A    Years of education: N/A     Occupational History    Not on file. Social History Main Topics    Smoking status: Never Smoker    Smokeless tobacco: Never Used    Alcohol use No    Drug use: Not on file    Sexual activity: Not on file     Other Topics Concern    Not on file     Social History Narrative     Physical Exam  Blood pressure (!) 150/92, pulse 88, height 5' 1\" (1.549 m), weight 72.6 kg (160 lb), SpO2 98 %. No acute distress, appears comfortable  Neck: no stiffness  Exts: no edema    Focused Neurological Exam     Mental status: Alert and oriented to person, place situation. Language: normal fluency and comprehension; no dysarthria. CNs:   Visual fields grossly normal  Extraocular movements intact, no nystagmus  Face appears symmetric and facial strength normal.    Hearing is intact to casual conversation. Sensory: intact LT in both legs  Motor: 5/5 in arms, 4+/ 5 in legs (reduced muscle mass)    Reflexes:  2+ patellars    Gait: mild unsteady (unchanged)    Impression    ICD-10-CM ICD-9-CM    1. Multiple sclerosis (HCC) G35 340 nortriptyline (PAMELOR) 25 mg capsule   2. Neuropathic pain M79.2 729.2 nortriptyline (PAMELOR) 25 mg capsule        79 y.o. yo female with MS, Hepatitis C (followed by GI/ hepatology)    1) Multiple Sclerosis  No recent exacerbations.   MRI Brain and C-spine in Jan 2018 show stable CNS lesion burden compared to prior studies in March 2016. Continue on Tecfidera 240 mg one tab BID. 2) Neuropathic, MS-related spinal pain (Lhermitte type)  High dose gabapentin hasn't helped. Gave her instructions on how to taper off over the course of 2 weeks. Instructed to go ahead and start taking Nortriptyline 25 mg QHS and if after 2 weeks she hasn't noticed any benefit, to increase it to 50 mg QHS. Common SEFx discussed. Advised her to call back in 4 weeks to update on whether the Nortripyline has helped spine pain and if not, will discuss increasing it further or changing back to Clonazepam QHS (may also help her intermittent leg cramping).      3) Intermittent leg cramps  See above    4) Left arm radicular pain (resolved)  See HPI     5) F/u in 2 months      Signed By: Rita Villalobos MD     September 19, 2018

## 2018-09-19 DIAGNOSIS — M79.2 NEUROPATHIC PAIN: ICD-10-CM

## 2018-09-19 DIAGNOSIS — G35 MULTIPLE SCLEROSIS (HCC): Primary | ICD-10-CM

## 2018-09-19 DIAGNOSIS — G35 MULTIPLE SCLEROSIS (HCC): ICD-10-CM

## 2018-09-19 RX ORDER — NORTRIPTYLINE HYDROCHLORIDE 25 MG/1
50 CAPSULE ORAL
Qty: 180 CAP | Refills: 0 | Status: SHIPPED | OUTPATIENT
Start: 2018-09-19 | End: 2018-11-20

## 2018-09-25 DIAGNOSIS — G35 MULTIPLE SCLEROSIS (HCC): ICD-10-CM

## 2018-09-25 RX ORDER — CLONAZEPAM 0.5 MG/1
TABLET ORAL
Qty: 30 TAB | Refills: 1 | Status: SHIPPED | OUTPATIENT
Start: 2018-09-25 | End: 2018-09-25 | Stop reason: SDUPTHER

## 2018-09-25 RX ORDER — CLONAZEPAM 0.5 MG/1
TABLET ORAL
Qty: 30 TAB | Refills: 1 | Status: SHIPPED | OUTPATIENT
Start: 2018-09-25 | End: 2018-11-20

## 2018-11-20 ENCOUNTER — OFFICE VISIT (OUTPATIENT)
Dept: NEUROLOGY | Age: 68
End: 2018-11-20

## 2018-11-20 VITALS
HEART RATE: 94 BPM | OXYGEN SATURATION: 97 % | WEIGHT: 160 LBS | HEIGHT: 61 IN | SYSTOLIC BLOOD PRESSURE: 126 MMHG | BODY MASS INDEX: 30.21 KG/M2 | DIASTOLIC BLOOD PRESSURE: 88 MMHG

## 2018-11-20 DIAGNOSIS — M79.2 NEUROPATHIC PAIN: ICD-10-CM

## 2018-11-20 DIAGNOSIS — G35 MULTIPLE SCLEROSIS (HCC): ICD-10-CM

## 2018-11-20 RX ORDER — NORTRIPTYLINE HYDROCHLORIDE 75 MG/1
75 CAPSULE ORAL
Qty: 90 CAP | Refills: 1 | Status: SHIPPED | OUTPATIENT
Start: 2018-11-20 | End: 2019-02-18

## 2018-11-20 NOTE — PROGRESS NOTES
Interval HPI: This is a 79 y.o. female who is following up for MS    Chief Complaint   Patient presents with    Multiple Sclerosis     Interval Hx    1) Multiple sclerosis   Denies any recent MS flares/ exacerbations. No interval episodes of vision loss, eye pain, or extremity weakness. Continues taking Tecfidera 240 mg BID. Last MRI Brain and C-spine done in Jan 2018. No Cervical spine lesions and no change in MRI Brain. Chronic MS symptoms: gait unsteadiness/ falls, bladder urgency, bowel urgency , spasticity (treated in past with baclofen), \"mini-seizure\" (episodes where she \"spaces out\", EEG in past normal, Keppra d/c'd, no recent spells)     MS Flare Hx:  : Pseudo-exacerbation due to UTI    2) Neck pain (neuropathic, Lhermitte-type)  MRI C-spine showed disc-osteophyte complex, mild spinal stenosis at C5-6 (unchagned compared to prior), no spinal cord lesions. Last visit, couldn't tell if (high-dose) gabapentin was helping. Advised her to taper off of it and see if pain was any worse. She tapered off, and can't say or tell if the pain is any worse. She stared Nortriptyline 50 mg QHS and says that's helped her sleep. Says still has pain radiating down spine, no change in frequency. Ran out of Klonopin 0.5 mg QHS but doesn't want to restart it, she's trying to minimize medications. Brief ROS: as above    =====================================  Brief Hx:   79 y.o. female with MS, Hep C, dx with MS around 5067-3619 while living in [place]. Initial symptoms was numbness in legs, unsteady gait, speech difficulty. Recalls having MRI Brain and LP that were abnormal. Tried Rebif, Avonex, Copaxone, and Betaseron in the past, but she's not sure why they were discontinued. She denies having any side effects to any of the injections, she's not sure if they were changed because of disease progression or relapses.  She recalls being hospitalized in March of 2014 for an MS relapse (confirmed by MRI Brain, and ? C-spine) and getting a course of IV solumedrol. The last MS med she was started on was Tecfidera and says no problems with that. Prior testing  EEG: normal awake/ drowsy. MRI Brain 3-2016: no significant changes from prior MRI Brain. MRI Brain 5-13-15: moderate white matter lesion burden consistent with Multiple Sclerosis. MRI C-spine 5-13-15: no cord lesions; moderate stenosis at C5-6. MRI T-spine done in 11/ 2013 (outside facility; see media section) didn't show any spinal stenosis or cord lesions. NMO antibodies were negative. MRI Brain (+/- contrast) done at [facility] on 2-23-17. They didn't have a prior MRI to compare it to (compared to a prior CT head) but report indicates scattered areas of T2 hyperintensity consistent with hx of MS, no abnormal enhancement. EMG (left leg) for c/o left lateral leg numbness, tingling. Shows a mild, chronic left L5 radiculopathy. No electrodiagnostic evidence of typical length-dependent peripheral neuropathy, thought that doesn't exclude possibility of small fiber neuropathy. Consider skin-punch biopsy if indicated. EMG (left arm) for c/o left arm pain: no CTS, ulnar neuropathy, or cervical radiculopathy. Repeated MRI C-spine as prior study was > 1 year ago (and had showed disc bulge/ extrusion at C5-6 causing bilateral foraminal narrowing). The updated MRI C-spine shows mild disc-osteophyte complex with mild central stenosis and mild narrowing of neural foramina at C5-6, similar to prior study. No other significant abnormalites of C-spine, no lesions in spinal cord. Allergies   Allergen Reactions    Iodinated Contrast- Oral And Iv Dye Anaphylaxis     Current Outpatient Medications   Medication Sig Dispense Refill    nortriptyline (PAMELOR) 75 mg capsule Take 1 Cap by mouth nightly for 90 days. For MS related neck, spine pain 90 Cap 1    lisinopril (PRINIVIL, ZESTRIL) 40 mg tablet Take 40 mg by mouth daily.       calcium-cholecalciferol, d3, (CALCIUM 600 + D) 600-125 mg-unit tab Take  by mouth.  insulin NPH/insulin regular (HUMULIN 70/30 U-100 INSULIN) 100 unit/mL (70-30) injection 22 Units by SubCUTAneous route Before breakfast and dinner.  amLODIPine (NORVASC) 10 mg tablet TK 1 T PO QD  3    dimethyl fumarate (TECFIDERA) 240 mg cpDR Take 1 Cap by mouth two (2) times a day. Indications: relapsing form of multiple sclerosis 180 Cap 1    tiotropium (SPIRIVA WITH HANDIHALER) 18 mcg inhalation capsule Take 1 Cap by inhalation daily.  montelukast (SINGULAIR) 10 mg tablet TK 1 T PO  D  3    hydroCHLOROthiazide (MICROZIDE) 12.5 mg capsule TK 1 C PO D  5    loperamide (IMODIUM) 2 mg capsule TK ONE C PO  BID PRN  0    omeprazole (PRILOSEC) 20 mg capsule Take 20 mg by mouth daily.  fluticasone-salmeterol (ADVAIR DISKUS) 250-50 mcg/dose diskus inhaler Take 1 Puff by inhalation every twelve (12) hours.  metoprolol succinate (TOPROL-XL) 50 mg XL tablet Take  by mouth daily.  clotrimazole-betamethasone (LOTRISONE) topical cream ARTURO EXT AA BID  0    albuterol (PROAIR HFA) 90 mcg/actuation inhaler Take  by inhalation. Past Medical History:   Diagnosis Date    Acid reflux     Arthritis     Asthma     Chronic obstructive pulmonary disease (HCC)     Diabetes ([address]     Headache     Hepatitis C     Hypertension     Seizures (HCC)     Sleep apnea        Past Surgical History:   Procedure Laterality Date    HX CHOLECYSTECTOMY      HX HYSTERECTOMY      HX TONSILLECTOMY         History reviewed. No pertinent family history.     Social History     Socioeconomic History    Marital status:      Spouse name: Not on file    Number of children: Not on file    Years of education: Not on file    Highest education level: Not on file   Social Needs    Financial resource strain: Not on file    Food insecurity - worry: Not on file    Food insecurity - inability: Not on file   [employer] needs - medical: Not on file   [employer] needs - non-medical: Not on file   Occupational History    Not on file   Tobacco Use    Smoking status: Never Smoker    Smokeless tobacco: Never Used   Substance and Sexual Activity    Alcohol use: No    Drug use: Not on file    Sexual activity: Not on file   Other Topics Concern    Not on file   Social History Narrative    Not on file     Physical Exam  Blood pressure 126/88, pulse 94, height 5' 1\" (1.549 m), weight 72.6 kg (160 lb), SpO2 97 %. No acute distress, appears comfortable  Neck: no stiffness  Exts: no edema    Focused Neurological Exam     Mental status: Alert and oriented to person, place situation. Language: normal fluency and comprehension; no dysarthria. CNs:   Visual fields grossly normal  Extraocular movements intact, no nystagmus  Face appears symmetric and facial strength normal.    Hearing is intact to casual conversation. Sensory: intact LT x 4 exts  Motor: 5/5 in arms, 4+/ 5 in legs   Reflexes:  2+ patellars    Gait: mild unsteady (unchanged)    Impression    ICD-10-CM ICD-9-CM    1. Multiple sclerosis (HCC) G35 340 nortriptyline (PAMELOR) 75 mg capsule   2. Neuropathic pain M79.2 729.2 nortriptyline (PAMELOR) 75 mg capsule        79 y.o. yo female with MS, Hepatitis C (followed by GI/ hepatology)    1) Multiple Sclerosis  No recent exacerbations. MRI Brain and C-spine in Jan 2018 show stable CNS lesion burden compared to prior studies in March 2016. No cervical cord lesions. Continue on Tecfidera 240 mg one tab BID. Asked pt to have PCP forward me most recent lab results so I can review. 2) Neuropathic, MS-related spinal pain (Lhermitte type)  Tried/ failed high-dose gabapentin. Unchanged on Nortriptyline 50 mg QHS. Increased it to 75 mg QHS.     3) F/u in 6 months    Signed By: Saad Uriarte MD     November 20, 2018

## 2019-01-14 DIAGNOSIS — M79.2 NEUROPATHIC PAIN: ICD-10-CM

## 2019-01-14 DIAGNOSIS — G35 MULTIPLE SCLEROSIS (HCC): ICD-10-CM

## 2019-02-20 DIAGNOSIS — G35 MS (MULTIPLE SCLEROSIS) (HCC): ICD-10-CM

## 2019-02-21 RX ORDER — DIMETHYL FUMARATE 240 MG/1
1 CAPSULE ORAL 2 TIMES DAILY
Qty: 180 CAP | Refills: 1 | Status: SHIPPED | OUTPATIENT
Start: 2019-02-21 | End: 2019-02-26 | Stop reason: SDUPTHER

## 2019-02-22 ENCOUNTER — TELEPHONE (OUTPATIENT)
Dept: NEUROLOGY | Age: 69
End: 2019-02-22

## 2019-02-22 NOTE — TELEPHONE ENCOUNTER
Contacted patient to let her know that we received a refill request for tecfidera and Shirley Goldman approved it and sent it into her pharmacy. Patient verbalized understanding.

## 2019-02-27 ENCOUNTER — OFFICE VISIT (OUTPATIENT)
Dept: ENDOCRINOLOGY | Age: 69
End: 2019-02-27

## 2019-02-27 VITALS
HEART RATE: 87 BPM | OXYGEN SATURATION: 99 % | WEIGHT: 171 LBS | DIASTOLIC BLOOD PRESSURE: 70 MMHG | BODY MASS INDEX: 32.28 KG/M2 | RESPIRATION RATE: 16 BRPM | SYSTOLIC BLOOD PRESSURE: 136 MMHG | HEIGHT: 61 IN | TEMPERATURE: 96.5 F

## 2019-02-27 DIAGNOSIS — I10 ESSENTIAL HYPERTENSION: ICD-10-CM

## 2019-02-27 DIAGNOSIS — E11.65 TYPE 2 DIABETES MELLITUS WITH HYPERGLYCEMIA, UNSPECIFIED WHETHER LONG TERM INSULIN USE (HCC): Primary | ICD-10-CM

## 2019-02-27 DIAGNOSIS — E11.65 TYPE 2 DIABETES MELLITUS WITH HYPERGLYCEMIA, WITHOUT LONG-TERM CURRENT USE OF INSULIN (HCC): Primary | ICD-10-CM

## 2019-02-27 RX ORDER — NORTRIPTYLINE HYDROCHLORIDE 75 MG/1
75 CAPSULE ORAL
COMMUNITY
End: 2019-09-05

## 2019-02-27 RX ORDER — DICLOFENAC SODIUM 75 MG/1
75 TABLET, DELAYED RELEASE ORAL 2 TIMES DAILY
COMMUNITY
End: 2020-07-30

## 2019-02-27 NOTE — PROGRESS NOTES
[facility] DIABETES AND ENDOCRINOLOGY Mery Ng MD 
 
    [address] [phone] Fax [phone] Patient Information Date:3/2/2019 Name : Davy Funez 79 y.o.    
YOB: 1950 Referred by: Richie Yen MD  
 
 
 
Chief Complaint Patient presents with  New Patient  
  referred by Dr. Richardson Russell for DM History of Present Illness: Davy Funez is a 79 y.o. female here for initial visit of  Type 2 Diabetes Mellitus. Type 2 Diabetes was diagnosed 2017  . End organ effects of diabetes: none. Cardiovascular risk factors: dyslipidemia, diabetes mellitus Monitoring frequency:2 /day and readings run 130 - 250 Last A1C was high and symptoms include no polyuria, polydipsia Hypoglycemia: yes She is on Humulin 70/30 22 units twice daily Weight trend: fluctuating a bit Prior visit with dietician: no 
Current diet: \"healthy\" diet  in general 
Current exercise: housecleaning, no regular exercise Wt Readings from Last 3 Encounters:  
02/27/19 171 lb (77.6 kg)  
11/20/18 160 lb (72.6 kg) 09/11/18 160 lb (72.6 kg) BP Readings from Last 3 Encounters:  
02/27/19 136/70  
11/20/18 126/88  
09/11/18 (!) 150/92 Past Medical History:  
Diagnosis Date  Acid reflux  Arthritis  Asthma  Chronic obstructive pulmonary disease [address]  Diabetes ([facility]  Headache  Hepatitis C   
 Hypertension  Seizures ([facility]  Sleep apnea Current Outpatient Medications Medication Sig  
 nortriptyline (PAMELOR) 75 mg capsule Take 75 mg by mouth nightly.  diclofenac EC (VOLTAREN) 75 mg EC tablet Take 75 mg by mouth two (2) times a day.  lisinopril (PRINIVIL, ZESTRIL) 40 mg tablet Take 40 mg by mouth daily.  calcium-cholecalciferol, d3, (CALCIUM 600 + D) 600-125 mg-unit tab Take  by mouth.   
 insulin NPH/insulin regular (HUMULIN 70/30 U-100 INSULIN) 100 unit/mL (70-30) injection 22 Units by SubCUTAneous route Before breakfast and dinner.  amLODIPine (NORVASC) 10 mg tablet TK 1 T PO QD  
 clotrimazole-betamethasone (LOTRISONE) topical cream ARTURO EXT AA BID  tiotropium (SPIRIVA WITH HANDIHALER) 18 mcg inhalation capsule Take 1 Cap by inhalation daily.  montelukast (SINGULAIR) 10 mg tablet TK 1 T PO  D  
 hydroCHLOROthiazide (MICROZIDE) 12.5 mg capsule TK 1 C PO D  
 loperamide (IMODIUM) 2 mg capsule TK ONE C PO  BID PRN  
 omeprazole (PRILOSEC) 20 mg capsule Take 20 mg by mouth daily.  albuterol (PROAIR HFA) 90 mcg/actuation inhaler Take  by inhalation.  fluticasone-salmeterol (ADVAIR DISKUS) 250-50 mcg/dose diskus inhaler Take 1 Puff by inhalation every twelve (12) hours.  metoprolol succinate (TOPROL-XL) 50 mg XL tablet Take  by mouth daily.  SITagliptin-metFORMIN (JANUMET XR) 50-1,000 mg TM24 Take 1 Tab by mouth two (2) times daily (with meals).  dimethyl fumarate (TECFIDERA) 240 mg cpDR Take 1 Cap by mouth two (2) times a day for 90 days. No current facility-administered medications for this visit. Allergies Allergen Reactions  Iodinated Contrast- Oral And Iv Dye Anaphylaxis Review of Systems:  All 10 systems reviewed and are negative other than mentioned in HPI Physical Examination: 
 Blood pressure 136/70, pulse 87, temperature 96.5 °F (35.8 °C), temperature source Oral, resp. rate 16, height 5' 1\" (1.549 m), weight 171 lb (77.6 kg), SpO2 99 %. Estimated body mass index is 32.31 kg/m² as calculated from the following: 
  Height as of this encounter: 5' 1\" (1.549 m). -   Weight as of this encounter: 171 lb (77.6 kg). - General: pleasant, no distress, good eye contact 
- HEENT: no pallor, no periorbital edema, EOMI 
- Neck: supple, no thyromegaly, no nodules - Cardiovascular: regular,  normal S1 and S2, no murmurs - Respiratory: clear to auscultation bilaterally - Gastrointestinal: soft, nontender, nondistended,  BS + 
- Musculoskeletal: no proximal muscle weakness in upper or lower extremities - Integumentary: no acanthosis nigricans,no edema,  
- Neurological: alert and oriented - Psychiatric: normal mood and affect 
- Skin: color, texture, turgor normal.  
 
Diabetic foot exam: Feb 2019 Left Foot: 
 Visual Exam: normal  
 Pulse DP: 2+ (normal) Filament test: reduced sensation Vibratory sensation: diminished Right Foot: 
 Visual Exam: normal  
 Pulse DP: 2+ (normal) Filament test: reduced sensation Vibratory sensation: diminished Data Reviewed:  
 
 
Lab Results Component Value Date/Time Hemoglobin A1c, External 6.3 04/29/2015 10:39 AM  
 Glucose 264 (H) 05/08/2018 10:12 AM  
 Creatinine (POC) 0.7 01/21/2018 01:25 PM  
 Creatinine 0.67 05/08/2018 10:12 AM  
  
Lab Results Component Value Date/Time GFR est non-AA 93 05/08/2018 10:12 AM  
 GFRNA, POC >60 01/21/2018 01:25 PM  
 GFR est  05/08/2018 10:12 AM  
 GFRAA, POC >60 01/21/2018 01:25 PM  
 Creatinine 0.67 05/08/2018 10:12 AM  
 Creatinine (POC) 0.7 01/21/2018 01:25 PM  
 BUN 18 05/08/2018 10:12 AM  
 Sodium 146 (H) 05/08/2018 10:12 AM  
 Potassium 4.4 05/08/2018 10:12 AM  
 Chloride 105 05/08/2018 10:12 AM  
 CO2 26 05/08/2018 10:12 AM  
 
 
Assessment/Plan: 1. Type 2 diabetes mellitus with hyperglycemia, without long-term current use of insulin ([facility]) 1. Type 2 Diabetes Mellitus Lab Results Component Value Date/Time Hemoglobin A1c, External 6.3 04/29/2015 10:39 AM  
 
Check blood glucose 3 times daily, discussed the pathophysiology of type 2 diabetes mellitus She wishes to come off insulin, Janumet extended release, discontinue insulin. Diabetic issues reviewed : glycemic goals , written exchange diet given, low carbohydrate diet, weight control , home glucose monitoring emphasized,  hypoglycemia management and long term diabetic complications discussed. FLU annually ,Pneumovax ,aspirin daily,annual eye exam,microalbumin 2. HTN : Continue current therapy 3. Hyperlipidemia : myalgia, off statin and refused History of hepatitis C, 
 
4.Obesity:Body mass index is 32.31 kg/m². Discussed about the importance of exercise and carbohydrate portion control. There are no Patient Instructions on file for this visit. Follow-up Disposition: 
Return in about 4 months (around 6/27/2019) for labs bnv and follow up. Thank you for allowing me to participate in the care of this patient. Jessi Lyle MD 
 
 
Patient verbalized understanding Voice-recognition software was used to generate this report, which may result in some phonetic-based errors in the grammar and contents. Even though attempts were made to correct all the mistakes, some may have been missed and remained in the body of the report.

## 2019-02-27 NOTE — PROGRESS NOTES
Edelmira Woods is a 79 y.o. female here for Chief Complaint Patient presents with  New Patient  
  referred by Dr. Diogenes Bee for DM Functional glucose monitor and record keeping system? - yes Eye exam within last year? -[facility] Dr. Tobin Guzman Foot exam within last year? -due 1. Have you been to the ER, urgent care clinic since your last visit? Hospitalized since your last visit? -n/a 2. Have you seen or consulted any other health care providers outside of the [address] [place] [address] since your last visit?   Include any pap smears or colon screening.-n/a

## 2019-02-28 ENCOUNTER — TELEPHONE (OUTPATIENT)
Dept: ENDOCRINOLOGY | Age: 69
End: 2019-02-28

## 2019-02-28 NOTE — TELEPHONE ENCOUNTER
I wrote it on a paper and gave it to patient - ask her to follow that and discussed with her too    Janumet 1 tablet 1st week and she cuts down the insulin to 12 units bID     2 nd week Janumet BID - stop insulin

## 2019-03-01 NOTE — TELEPHONE ENCOUNTER
Asked pt if she still had the piece of paper Dr. Vinnie López gave her. Pt stated she would have to look through her purse. Asked pt to get something to write with and provided her with the instructions as noted by Dr. Vinnie López above. Pt verbalized understanding with no further questions or concerns at this time.

## 2019-03-02 PROBLEM — I10 ESSENTIAL HYPERTENSION: Status: ACTIVE | Noted: 2019-03-02

## 2019-03-02 PROBLEM — E11.65 TYPE 2 DIABETES MELLITUS WITH HYPERGLYCEMIA, WITHOUT LONG-TERM CURRENT USE OF INSULIN (HCC): Status: ACTIVE | Noted: 2019-03-02

## 2019-05-17 ENCOUNTER — TELEPHONE (OUTPATIENT)
Dept: NEUROLOGY | Age: 69
End: 2019-05-17

## 2019-05-17 DIAGNOSIS — G35 MS (MULTIPLE SCLEROSIS) (HCC): ICD-10-CM

## 2019-05-17 RX ORDER — DIMETHYL FUMARATE 240 MG/1
1 CAPSULE ORAL 2 TIMES DAILY
Qty: 180 CAP | Refills: 0 | Status: CANCELLED | OUTPATIENT
Start: 2019-05-17 | End: 2019-08-15

## 2019-05-17 NOTE — TELEPHONE ENCOUNTER
----- Message from Cole Booker sent at 5/17/2019 11:14 AM EDT -----  Regarding: Dr. Yohan Calvert is calling regarding refill request for pt's \"tecfidera\" Best contact is [phone] ext [phone].

## 2019-05-21 DIAGNOSIS — G35 MS (MULTIPLE SCLEROSIS) (HCC): ICD-10-CM

## 2019-05-21 RX ORDER — DIMETHYL FUMARATE 240 MG/1
1 CAPSULE ORAL 2 TIMES DAILY
Qty: 180 CAP | Refills: 0 | Status: SHIPPED | OUTPATIENT
Start: 2019-05-21 | End: 2019-07-23 | Stop reason: SDUPTHER

## 2019-07-23 ENCOUNTER — OFFICE VISIT (OUTPATIENT)
Dept: NEUROLOGY | Age: 69
End: 2019-07-23

## 2019-07-23 VITALS
DIASTOLIC BLOOD PRESSURE: 80 MMHG | HEIGHT: 61 IN | RESPIRATION RATE: 20 BRPM | SYSTOLIC BLOOD PRESSURE: 132 MMHG | OXYGEN SATURATION: 98 % | HEART RATE: 84 BPM | WEIGHT: 166 LBS | BODY MASS INDEX: 31.34 KG/M2

## 2019-07-23 DIAGNOSIS — G35 MS (MULTIPLE SCLEROSIS) (HCC): Primary | ICD-10-CM

## 2019-07-23 RX ORDER — DIMETHYL FUMARATE 240 MG/1
1 CAPSULE ORAL 2 TIMES DAILY
Qty: 180 CAP | Refills: 1 | Status: SHIPPED | OUTPATIENT
Start: 2019-07-23 | End: 2019-10-21

## 2019-07-23 NOTE — PATIENT INSTRUCTIONS
Please ask your Liver Doctor to send Dr Charlene Rosado [facility] a copy of your most recent CBC and CMP (chemistry panel). I need to review it since you're on Tecfidera.

## 2019-07-23 NOTE — PROGRESS NOTES
Patient is here for a follow up for MS and neuropathic pain. She states that she has been feeling pretty good, she has no changes at this time. Just fatigued. No other concerns at this time.

## 2019-07-23 NOTE — PROGRESS NOTES
Interval HPI: This is a 79 y.o. female who is following up for MS    Chief Complaint   Patient presents with    Multiple Sclerosis     Follow Up    Peripheral Neuropathy     Interval Hx    1) Multiple sclerosis   Denies any recent MS flares/ exacerbations. No interval episodes of vision loss, eye pain, or extremity weakness. Continues taking Tecfidera 240 mg BID. Last MRI Brain and C-spine done in Jan 2018. No Cervical spine lesions and no change in MRI Brain. Chronic MS symptoms: gait unsteadiness/ falls, bladder urgency, bowel urgency , spasticity (treated in past with baclofen), \"mini-seizure\" (episodes where she \"spaces out\", EEG in past normal, Keppra d/c'd, no recent spells)     MS Flare Hx:  : Pseudo-exacerbation due to UTI    2) Neck pain (neuropathic, Lhermitte-type)  MRI C-spine showed disc-osteophyte complex, mild spinal stenosis at C5-6 (unchagned compared to prior), no spinal cord lesions. Pt decided that she didn't want to use too many medications, stopped Nortriptyline and Klonopin. Still gets the pain episodically but just deals with it, doesn't want to be on a medication for it. Brief ROS: as above    =====================================  Brief Hx:   79 y.o. female with MS, Hep C, dx with MS around 8813-4319 while living in [facility]. Initial symptoms was numbness in legs, unsteady gait, speech difficulty. Recalls having MRI Brain and LP that were abnormal. Tried Rebif, Avonex, Copaxone, and Betaseron in the past, but she's not sure why they were discontinued. She denies having any side effects to any of the injections, she's not sure if they were changed because of disease progression or relapses. She recalls being hospitalized in March of 2014 for an MS relapse (confirmed by MRI Brain, and ? C-spine) and getting a course of IV solumedrol. The last MS med she was started on was Tecfidera and says no problems with that. Prior testing  EEG: normal awake/ drowsy.    MRI Brain 3-2016: no significant changes from prior MRI Brain. MRI Brain 5-13-15: moderate white matter lesion burden consistent with Multiple Sclerosis. MRI C-spine 5-13-15: no cord lesions; moderate stenosis at C5-6. MRI T-spine done in 11/ 2013 (outside facility; see media section) didn't show any spinal stenosis or cord lesions. NMO antibodies were negative. MRI Brain (+/- contrast) done at [facility] [place] on 2-23-17. They didn't have a prior MRI to compare it to (compared to a prior CT head) but report indicates scattered areas of T2 hyperintensity consistent with hx of MS, no abnormal enhancement. EMG (left leg) for c/o left lateral leg numbness, tingling. Shows a mild, chronic left L5 radiculopathy. No electrodiagnostic evidence of typical length-dependent peripheral neuropathy, thought that doesn't exclude possibility of small fiber neuropathy. Consider skin-punch biopsy if indicated. EMG (left arm) for c/o left arm pain: no CTS, ulnar neuropathy, or cervical radiculopathy. Repeated MRI C-spine as prior study was > 1 year ago (and had showed disc bulge/ extrusion at C5-6 causing bilateral foraminal narrowing). The updated MRI C-spine shows mild disc-osteophyte complex with mild central stenosis and mild narrowing of neural foramina at C5-6, similar to prior study. No other significant abnormalites of C-spine, no lesions in spinal cord. Allergies   Allergen Reactions    Iodinated Contrast- Oral And Iv Dye Anaphylaxis    Gluten Diarrhea    Lactose Diarrhea     Current Outpatient Medications   Medication Sig Dispense Refill    dimethyl fumarate (TECFIDERA) 240 mg cpDR Take 1 Cap by mouth two (2) times a day for 90 days. Indications: relapsing form of multiple sclerosis 180 Cap 1    ONETOUCH ULTRA BLUE TEST STRIP strip USE TO TEST BLOOD SUGAR LEVELS THREE TIMES DAILY 300 Strip 0    SITagliptin-metFORMIN (JANUMET XR) 50-1,000 mg TM24 Take 1 Tab by mouth two (2) times daily (with meals).  [address] [address] Tab 3    lisinopril (PRINIVIL, ZESTRIL) 40 mg tablet Take 40 mg by mouth daily.  amLODIPine (NORVASC) 10 mg tablet TK 1 T PO QD  3    tiotropium (SPIRIVA WITH HANDIHALER) 18 mcg inhalation capsule Take 1 Cap by inhalation daily.  montelukast (SINGULAIR) 10 mg tablet TK 1 T PO  D  3    hydroCHLOROthiazide (MICROZIDE) 12.5 mg capsule TK 1 C PO D  5    loperamide (IMODIUM) 2 mg capsule TK ONE C PO  BID PRN  0    omeprazole (PRILOSEC) 20 mg capsule Take 20 mg by mouth daily.  albuterol (PROAIR HFA) 90 mcg/actuation inhaler Take  by inhalation.  fluticasone-salmeterol (ADVAIR DISKUS) 250-50 mcg/dose diskus inhaler Take 1 Puff by inhalation every twelve (12) hours.  metoprolol succinate (TOPROL-XL) 50 mg XL tablet Take  by mouth daily.  nortriptyline (PAMELOR) 75 mg capsule Take 75 mg by mouth nightly.  diclofenac EC (VOLTAREN) 75 mg EC tablet Take 75 mg by mouth two (2) times a day.  calcium-cholecalciferol, d3, (CALCIUM 600 + D) 600-125 mg-unit tab Take  by mouth.  clotrimazole-betamethasone (LOTRISONE) topical cream ARTURO EXT AA BID  0     PMHx:   Past Medical History:   Diagnosis Date    Acid reflux     Arthritis     Asthma     Chronic obstructive pulmonary disease (HCC)     Diabetes ([address]     Headache     Hepatitis C     Hypertension     Seizures (HCC)     Sleep apnea      PSHx:  has a past surgical history that includes hx cholecystectomy; hx hysterectomy; and hx tonsillectomy. SocHx:  reports that she has never smoked. She has never used smokeless tobacco. She reports that she does not drink alcohol or use drugs. FHx: family history includes Diabetes in her brother and father. Physical Exam  Blood pressure 132/80, pulse 84, resp. rate 20, height 5' 1\" (1.549 m), weight 75.3 kg (166 lb), SpO2 98 %.     No acute distress, appears comfortable  Neck: no stiffness  Exts: no edema    Focused Neurological Exam     Mental status: Alert and oriented to person, place situation. Language: normal fluency and comprehension; no dysarthria. CNs:   Visual fields grossly normal  No papilledema on exam (both sides)   Extraocular movements intact, no nystagmus  Face appears symmetric and facial strength normal.    Hearing is intact to casual conversation. Sensory: intact LT x 4 exts  Motor: 5/5 in arms, 4+/ 5 in legs   Reflexes:  2+ patellars    Gait: mild unsteady (unchanged)    Impression    ICD-10-CM ICD-9-CM    1. MS (multiple sclerosis) (HCC) G35 340 dimethyl fumarate (TECFIDERA) 240 mg cpDR      MRI BRAIN WO CONT      MRI CERV SPINE WO CONT        79 y.o. yo female with MS, Hepatitis C (treated/ cured per pt, followed by Hepatologist)     1) Multiple Sclerosis  No recent exacerbations. MRI Brain and C-spine in Jan 2018 show stable CNS lesion burden compared to prior studies in March 2016. No cervical cord lesions. Continue on Tecfidera 240 mg one tab BID. Ordered MRI Brain and C-spine to be done in January 202 prior to her follow-up visit later that month. Asked pt to have Liver Doc to forward me most recent lab results so I can review.      2) Neuropathic, MS-related spinal pain (Lhermitte type)  Pt elects to be off any neuropathic pain medications    3) F/u in 6 months    Signed By: Bruno Dumont MD     July 23, 2019

## 2019-08-06 DIAGNOSIS — E11.65 TYPE 2 DIABETES MELLITUS WITH HYPERGLYCEMIA, UNSPECIFIED WHETHER LONG TERM INSULIN USE (HCC): Primary | ICD-10-CM

## 2019-08-06 DIAGNOSIS — E11.65 TYPE 2 DIABETES MELLITUS WITH HYPERGLYCEMIA, UNSPECIFIED WHETHER LONG TERM INSULIN USE (HCC): ICD-10-CM

## 2019-08-29 DIAGNOSIS — E11.65 TYPE 2 DIABETES MELLITUS WITH HYPERGLYCEMIA, WITHOUT LONG-TERM CURRENT USE OF INSULIN (HCC): ICD-10-CM

## 2019-08-30 LAB
ALBUMIN SERPL-MCNC: 3.9 G/DL (ref 3.6–4.8)
ALBUMIN/CREAT UR: 18.8 MG/G CREAT (ref 0–30)
ALBUMIN/GLOB SERPL: 1.2 {RATIO} (ref 1.2–2.2)
ALP SERPL-CCNC: 67 IU/L (ref 39–117)
ALT SERPL-CCNC: 42 IU/L (ref 0–32)
AST SERPL-CCNC: 31 IU/L (ref 0–40)
BILIRUB SERPL-MCNC: 0.6 MG/DL (ref 0–1.2)
BUN SERPL-MCNC: 22 MG/DL (ref 8–27)
BUN/CREAT SERPL: 25 (ref 12–28)
CALCIUM SERPL-MCNC: 10.2 MG/DL (ref 8.7–10.3)
CHLORIDE SERPL-SCNC: 107 MMOL/L (ref 96–106)
CO2 SERPL-SCNC: 23 MMOL/L (ref 20–29)
CREAT SERPL-MCNC: 0.89 MG/DL (ref 0.57–1)
CREAT UR-MCNC: 91.6 MG/DL
EST. AVERAGE GLUCOSE BLD GHB EST-MCNC: 131 MG/DL
GLOBULIN SER CALC-MCNC: 3.3 G/DL (ref 1.5–4.5)
GLUCOSE SERPL-MCNC: 131 MG/DL (ref 65–99)
HBA1C MFR BLD: 6.2 % (ref 4.8–5.6)
LDLC SERPL DIRECT ASSAY-MCNC: 110 MG/DL (ref 0–99)
MICROALBUMIN UR-MCNC: 17.2 UG/ML
POTASSIUM SERPL-SCNC: 4.7 MMOL/L (ref 3.5–5.2)
PROT SERPL-MCNC: 7.2 G/DL (ref 6–8.5)
SODIUM SERPL-SCNC: 145 MMOL/L (ref 134–144)

## 2019-09-04 NOTE — PROGRESS NOTES
Blanka Kendall is a 79 y.o. female here for   Chief Complaint   Patient presents with    Diabetes       Functional glucose monitor and record keeping system? -yes   Eye exam within last year? - [facility] Dr. Caty Ribeiro exam within last year? - on file    1. Have you been to the ER, urgent care clinic since your last visit? Hospitalized since your last visit? -no    2. Have you seen or consulted any other health care providers outside of the [address] [place] [address] since your last visit? Include any pap smears or colon screening. -PCP

## 2019-09-05 ENCOUNTER — OFFICE VISIT (OUTPATIENT)
Dept: ENDOCRINOLOGY | Age: 69
End: 2019-09-05

## 2019-09-05 VITALS
BODY MASS INDEX: 30.78 KG/M2 | HEIGHT: 61 IN | TEMPERATURE: 97.3 F | SYSTOLIC BLOOD PRESSURE: 124 MMHG | RESPIRATION RATE: 14 BRPM | HEART RATE: 71 BPM | DIASTOLIC BLOOD PRESSURE: 58 MMHG | WEIGHT: 163 LBS

## 2019-09-05 DIAGNOSIS — E78.2 MIXED HYPERLIPIDEMIA: ICD-10-CM

## 2019-09-05 DIAGNOSIS — E11.65 TYPE 2 DIABETES MELLITUS WITH HYPERGLYCEMIA, WITHOUT LONG-TERM CURRENT USE OF INSULIN (HCC): Primary | ICD-10-CM

## 2019-09-05 DIAGNOSIS — I10 ESSENTIAL HYPERTENSION: ICD-10-CM

## 2019-09-05 RX ORDER — SIMETHICONE 80 MG
80 TABLET,CHEWABLE ORAL
COMMUNITY

## 2019-09-05 RX ORDER — LANCETS 33 GAUGE
EACH MISCELLANEOUS
Qty: 300 LANCET | Refills: 3 | Status: SHIPPED | OUTPATIENT
Start: 2019-09-05 | End: 2020-07-30

## 2019-09-05 RX ORDER — BISMUTH SUBSALICYLATE 262 MG
1 TABLET,CHEWABLE ORAL DAILY
COMMUNITY

## 2019-09-05 RX ORDER — ASPIRIN 81 MG/1
81 TABLET ORAL
COMMUNITY

## 2019-09-05 NOTE — LETTER
9/5/19 Patient: Lashonda Maguire YOB: 1950 Date of Visit: 9/5/2019 Collin Smith MD 
[address] [address] VIA Facsimile: [phone] Dear Collin Smith MD, Thank you for referring Ms. Lashonda Maguire to [address] [place] [address] for evaluation. My notes for this consultation are attached. If you have questions, please do not hesitate to call me. I look forward to following your patient along with you. Sincerely, Harjinder Garcia MD

## 2019-09-05 NOTE — PROGRESS NOTES
[facility]               Keiry Escoto MD        [address] [address] RB:[phone] Fax [phone]               Patient Information  Date:9/5/2019  Name : Grey Ma 79 y.o.     YOB: 1950         Referred by: Sade Martinez MD         Chief Complaint   Patient presents with    Diabetes       History of Present Illness: Grey Ma is a 79 y.o. female here for follow-up of  Type 2 Diabetes Mellitus. Type 2 Diabetes was diagnosed 2017  . End organ effects of diabetes: none. Cardiovascular risk factors: dyslipidemia, diabetes mellitus   She is off insulin  Taking Janumet  No hypoglycemia      Current exercise: housecleaning, no regular exercise    Wt Readings from Last 3 Encounters:   09/05/19 163 lb (73.9 kg)   07/23/19 166 lb (75.3 kg)   02/27/19 171 lb (77.6 kg)       BP Readings from Last 3 Encounters:   09/05/19 124/58   07/23/19 132/80   02/27/19 136/70           Past Medical History:   Diagnosis Date    Acid reflux     Arthritis     Asthma     Chronic obstructive pulmonary disease (HCC)     Diabetes (HCC)     Headache     Hepatitis C     Hypertension     Seizures (HCC)     Sleep apnea      Current Outpatient Medications   Medication Sig    aspirin delayed-release 81 mg tablet Take 81 mg by mouth nightly.  potassium 99 mg tablet Take 99 mg by mouth daily.  multivitamin (ONE A DAY) tablet Take 1 Tab by mouth daily.  simethicone (GAS RELIEF) 80 mg chewable tablet Take 80 mg by mouth every six (6) hours as needed for Flatulence.  glucose blood VI test strips (ONETOUCH ULTRA BLUE TEST STRIP) strip USE TO TEST BLOOD SUGAR LEVELS THREE TIMES DAILY Dx Code: E11.65    dimethyl fumarate (TECFIDERA) 240 mg cpDR Take 1 Cap by mouth two (2) times a day for 90 days. Indications: relapsing form of multiple sclerosis (Patient taking differently: Take 2 Caps by mouth two (2) times a day.  Indications: relapsing form of multiple sclerosis)    diclofenac EC (VOLTAREN) 75 mg EC tablet Take 75 mg by mouth two (2) times a day.  SITagliptin-metFORMIN (JANUMET XR) 50-1,000 mg TM24 Take 1 Tab by mouth two (2) times daily (with meals).  lisinopril (PRINIVIL, ZESTRIL) 40 mg tablet Take 40 mg by mouth daily.  amLODIPine (NORVASC) 10 mg tablet TK 1 T PO QD    tiotropium (SPIRIVA WITH HANDIHALER) 18 mcg inhalation capsule Take 1 Cap by inhalation daily.  montelukast (SINGULAIR) 10 mg tablet TK 1 T PO  D    hydroCHLOROthiazide (MICROZIDE) 12.5 mg capsule TK 1 C PO D    loperamide (IMODIUM) 2 mg capsule TK ONE C PO  BID PRN    omeprazole (PRILOSEC) 20 mg capsule Take 20 mg by mouth daily.  albuterol (PROAIR HFA) 90 mcg/actuation inhaler Take  by inhalation.  fluticasone-salmeterol (ADVAIR DISKUS) 250-50 mcg/dose diskus inhaler Take 1 Puff by inhalation every twelve (12) hours.  metoprolol succinate (TOPROL-XL) 50 mg XL tablet Take  by mouth daily. No current facility-administered medications for this visit. Allergies   Allergen Reactions    Iodinated Contrast Media Anaphylaxis    Gluten Diarrhea    Lactose Diarrhea       Review of Systems:  All 10 systems reviewed and are negative other than mentioned in HPI    Physical Examination:   Blood pressure 124/58, pulse 71, temperature 97.3 °F (36.3 °C), temperature source Oral, resp. rate 14, height 5' 1\" (1.549 m), weight 163 lb (73.9 kg). Estimated body mass index is 30.8 kg/m² as calculated from the following:    Height as of this encounter: 5' 1\" (1.549 m). -   Weight as of this encounter: 163 lb (73.9 kg).   - General: pleasant, no distress, good eye contact  - HEENT: no pallor, no periorbital edema, EOMI  - Neck: supple,   - Cardiovascular: regular,  normal S1 and S2,  - Respiratory: clear to auscultation bilaterally  - Gastrointestinal: soft, nontender, nondistended,  BS +  - Musculoskeletal: no proximal muscle weakness in upper or lower extremities  -   - Neurological: alert and oriented  - Psychiatric: normal mood and affect  - Skin: color, texture, turgor normal.     Diabetic foot exam: Feb 2019     Left Foot:   Visual Exam: normal    Pulse DP: 2+ (normal)   Filament test: reduced sensation    Vibratory sensation: diminished      Right Foot:   Visual Exam: normal    Pulse DP: 2+ (normal)   Filament test: reduced sensation    Vibratory sensation: diminished      Data Reviewed:       Lab Results   Component Value Date/Time    Hemoglobin A1c 6.2 (H) 08/29/2019 08:31 AM    Hemoglobin A1c, External 6.3 04/29/2015 10:39 AM    Glucose 131 (H) 08/29/2019 08:31 AM    Microalb/Creat ratio (ug/mg creat.) 18.8 08/29/2019 08:31 AM    LDL,Direct 110 (H) 08/29/2019 08:31 AM    Creatinine (POC) 0.7 01/21/2018 01:25 PM    Creatinine 0.89 08/29/2019 08:31 AM      Lab Results   Component Value Date/Time    GFR est non-AA 68 08/29/2019 08:31 AM    GFRNA, POC >60 01/21/2018 01:25 PM    GFR est AA 78 08/29/2019 08:31 AM    GFRAA, POC >60 01/21/2018 01:25 PM    Creatinine 0.89 08/29/2019 08:31 AM    Creatinine (POC) 0.7 01/21/2018 01:25 PM    BUN 22 08/29/2019 08:31 AM    Sodium 145 (H) 08/29/2019 08:31 AM    Potassium 4.7 08/29/2019 08:31 AM    Chloride 107 (H) 08/29/2019 08:31 AM    CO2 23 08/29/2019 08:31 AM       Assessment/Plan:     1. Type 2 diabetes mellitus with hyperglycemia, without long-term current use of insulin ([address]    2. Essential hypertension        1. Type 2 Diabetes Mellitus  Lab Results   Component Value Date/Time    Hemoglobin A1c 6.2 (H) 08/29/2019 08:31 AM    Hemoglobin A1c, External 6.3 04/29/2015 10:39 AM     Janumet, discontinued insulin    Diabetic issues reviewed : glycemic goals , written exchange diet given, low carbohydrate diet, weight control , home glucose monitoring emphasized,  hypoglycemia management and long term diabetic complications discussed. FLU annually ,Pneumovax ,aspirin daily,annual eye exam,microalbumin    2.   HTN : Continue current therapy     3. Hyperlipidemia : myalgia, off statin and refused   History of hepatitis C,    4.Obesity:Body mass index is 30.8 kg/m². Discussed about the importance of exercise and carbohydrate portion control. There are no Patient Instructions on file for this visit. Thank you for allowing me to participate in the care of this patient. Myrna Turner MD      Patient verbalized understanding     Voice-recognition software was used to generate this report, which may result in some phonetic-based errors in the grammar and contents. Even though attempts were made to correct all the mistakes, some may have been missed and remained in the body of the report.

## 2019-11-03 RX ORDER — BLOOD SUGAR DIAGNOSTIC
STRIP MISCELLANEOUS
Qty: 300 STRIP | Refills: 0 | Status: SHIPPED | OUTPATIENT
Start: 2019-11-03 | End: 2020-02-02

## 2020-01-22 NOTE — PROGRESS NOTES
Brown Ruelas is a 81 y.o. female here for   Chief Complaint   Patient presents with    Diabetes       Functional glucose monitor and record keeping system? -yes   Eye exam within last year? -[facility] Dr. Harleen Dumont exam within last year? - on file    1. Have you been to the ER, urgent care clinic since your last visit? Hospitalized since your last visit? -no    2. Have you seen or consulted any other health care providers outside of the [address] [place] [address] since your last visit? Include any pap smears or colon screening. -PCP and Dr. Aline Luque for pancreas

## 2020-01-23 ENCOUNTER — OFFICE VISIT (OUTPATIENT)
Dept: ENDOCRINOLOGY | Age: 70
End: 2020-01-23

## 2020-01-23 ENCOUNTER — HOSPITAL ENCOUNTER (OUTPATIENT)
Dept: LAB | Age: 70
Discharge: HOME | End: 2020-01-23

## 2020-01-23 VITALS
OXYGEN SATURATION: 100 % | HEART RATE: 79 BPM | TEMPERATURE: 96.2 F | DIASTOLIC BLOOD PRESSURE: 54 MMHG | BODY MASS INDEX: 27.56 KG/M2 | SYSTOLIC BLOOD PRESSURE: 127 MMHG | WEIGHT: 146 LBS | RESPIRATION RATE: 16 BRPM | HEIGHT: 61 IN

## 2020-01-23 DIAGNOSIS — I10 ESSENTIAL HYPERTENSION: ICD-10-CM

## 2020-01-23 DIAGNOSIS — E11.65 TYPE 2 DIABETES MELLITUS WITH HYPERGLYCEMIA, WITHOUT LONG-TERM CURRENT USE OF INSULIN (HCC): Primary | ICD-10-CM

## 2020-01-23 DIAGNOSIS — E11.65 TYPE 2 DIABETES MELLITUS WITH HYPERGLYCEMIA, WITHOUT LONG-TERM CURRENT USE OF INSULIN (HCC): ICD-10-CM

## 2020-01-23 DIAGNOSIS — E78.2 MIXED HYPERLIPIDEMIA: ICD-10-CM

## 2020-01-23 LAB
ANION GAP SERPL CALC-SCNC: 5 MMOL/L (ref 5–15)
BUN SERPL-MCNC: 26 MG/DL (ref 6–20)
BUN/CREAT SERPL: 28 (ref 12–20)
CALCIUM SERPL-MCNC: 10.4 MG/DL (ref 8.5–10.1)
CHLORIDE SERPL-SCNC: 111 MMOL/L (ref 97–108)
CO2 SERPL-SCNC: 26 MMOL/L (ref 21–32)
CREAT SERPL-MCNC: 0.93 MG/DL (ref 0.55–1.02)
GLUCOSE SERPL-MCNC: 141 MG/DL (ref 65–100)
POTASSIUM SERPL-SCNC: 4.2 MMOL/L (ref 3.5–5.1)
SODIUM SERPL-SCNC: 142 MMOL/L (ref 136–145)

## 2020-01-23 RX ORDER — PANCRELIPASE 24000; 76000; 120000 [USP'U]/1; [USP'U]/1; [USP'U]/1
CAPSULE, DELAYED RELEASE PELLETS ORAL
COMMUNITY
Start: 2019-12-18 | End: 2020-07-30

## 2020-01-23 RX ORDER — TOPIRAMATE 100 MG/1
100 TABLET, FILM COATED ORAL
COMMUNITY
End: 2020-07-30

## 2020-01-23 NOTE — PATIENT INSTRUCTIONS
Please ask Dr Howie Villar to send the records , if there is history of pancreatitis then we have to change the medications

## 2020-01-23 NOTE — LETTER
1/25/20 Patient: Love Cochran YOB: 1950 Date of Visit: 1/23/2020 Jayde Sutton MD 
[address] [place] [address] VIA Facsimile: [phone] Dear Jayde Sutton MD, Thank you for referring Ms. Elio Moreira to [address] [place] [address] for evaluation. My notes for this consultation are attached. If you have questions, please do not hesitate to call me. I look forward to following your patient along with you. Sincerely, Janell Baeza MD

## 2020-01-23 NOTE — PROGRESS NOTES
Cindy Carrizales MD        Patient Information  Date:1/23/2020  Name : Osmin Spangler 81 y.o.     YOB: 1950         Referred by: Alton Puga MD         Chief Complaint   Patient presents with    Diabetes       History of Present Illness: Osmin Spangler is a 81 y.o. female here for follow-up of  Type 2 Diabetes Mellitus. Type 2 Diabetes was diagnosed 2017  . End organ effects of diabetes: none. Cardiovascular risk factors: dyslipidemia, diabetes mellitus   She is off insulin  Taking Janumet  She has lost weight      Current exercise: housecleaning, no regular exercise    Wt Readings from Last 3 Encounters:   01/23/20 146 lb (66.2 kg)   09/05/19 163 lb (73.9 kg)   07/23/19 166 lb (75.3 kg)       BP Readings from Last 3 Encounters:   01/23/20 127/54   09/05/19 124/58   07/23/19 132/80           Past Medical History:   Diagnosis Date    Acid reflux     Arthritis     Asthma     Chronic obstructive pulmonary disease (HCC)     Diabetes (HCC)     Headache     Hepatitis C     Hypertension     Seizures (HCC)     Sleep apnea      Current Outpatient Medications   Medication Sig    topiramate (TOPAMAX) 100 mg tablet Take 100 mg by mouth nightly.  CREON 24,000-76,000 -120,000 unit capsule TK 2 CS PO WITH MEALS    ACCU-CHEK MARI PLUS TEST STRP strip USE TO CHECK BLOOD SUGAR THREE TIMES DAILY.  aspirin delayed-release 81 mg tablet Take 81 mg by mouth nightly.  potassium 99 mg tablet Take 99 mg by mouth daily.  multivitamin (ONE A DAY) tablet Take 1 Tab by mouth daily.  simethicone (GAS RELIEF) 80 mg chewable tablet Take 80 mg by mouth every six (6) hours as needed for Flatulence.  SITagliptin-metFORMIN (JANUMET XR) 50-1,000 mg TM24 Take 1 Tab by mouth two (2) times daily (with meals).  lisinopril (PRINIVIL, ZESTRIL) 40 mg tablet Take 40 mg by mouth daily.     amLODIPine (NORVASC) 10 mg tablet TK 1 T PO QD    tiotropium (SPIRIVA WITH HANDIHALER) 18 mcg inhalation capsule Take 1 Cap by inhalation daily.  montelukast (SINGULAIR) 10 mg tablet TK 1 T PO  D    hydroCHLOROthiazide (MICROZIDE) 12.5 mg capsule TK 1 C PO D    loperamide (IMODIUM) 2 mg capsule TK ONE C PO  BID PRN    omeprazole (PRILOSEC) 20 mg capsule Take 20 mg by mouth daily.  albuterol (PROAIR HFA) 90 mcg/actuation inhaler Take  by inhalation.  fluticasone-salmeterol (ADVAIR DISKUS) 250-50 mcg/dose diskus inhaler Take 1 Puff by inhalation every twelve (12) hours.  metoprolol succinate (TOPROL-XL) 50 mg XL tablet Take  by mouth daily.  lancets (ONE TOUCH DELICA) 33 gauge misc USE TO TEST BLOOD SUGAR LEVELS THREE TIMES DAILY Dx Code: E11.65    glucose blood VI test strips (ONETOUCH ULTRA BLUE TEST STRIP) strip USE TO TEST BLOOD SUGAR LEVELS THREE TIMES DAILY Dx Code: E11.65    diclofenac EC (VOLTAREN) 75 mg EC tablet Take 75 mg by mouth two (2) times a day. No current facility-administered medications for this visit. Allergies   Allergen Reactions    Iodinated Contrast Media Anaphylaxis    Gluten Diarrhea    Lactose Diarrhea       Review of Systems:  All 10 systems reviewed and are negative other than mentioned in HPI    Physical Examination:   Blood pressure 127/54, pulse 79, temperature 96.2 °F (35.7 °C), temperature source Oral, resp. rate 16, height 5' 1\" (1.549 m), weight 146 lb (66.2 kg), SpO2 100 %. Estimated body mass index is 27.59 kg/m² as calculated from the following:    Height as of this encounter: 5' 1\" (1.549 m). -   Weight as of this encounter: 146 lb (66.2 kg).   - General: pleasant, no distress, good eye contact  - HEENT: no pallor, no periorbital edema, EOMI  - Neck: supple,   - Cardiovascular: regular,  normal S1 and S2,  - Respiratory: clear to auscultation bilaterally  - Gastrointestinal: soft, nontender, nondistended,  BS +  - Musculoskeletal: no proximal muscle weakness in upper or lower extremities  -   - Neurological: alert and oriented  - Psychiatric: normal mood and affect  - Skin: color, texture, turgor normal.     Diabetic foot exam: Feb 2019     Left Foot:   Visual Exam: normal    Pulse DP: 2+ (normal)   Filament test: reduced sensation    Vibratory sensation: diminished      Right Foot:   Visual Exam: normal    Pulse DP: 2+ (normal)   Filament test: reduced sensation    Vibratory sensation: diminished      Data Reviewed:       Lab Results   Component Value Date/Time    Hemoglobin A1c 6.2 (H) 08/29/2019 08:31 AM    Hemoglobin A1c, External 6.3 04/29/2015 10:39 AM    Glucose 131 (H) 08/29/2019 08:31 AM    Microalb/Creat ratio (ug/mg creat.) 18.8 08/29/2019 08:31 AM    LDL,Direct 110 (H) 08/29/2019 08:31 AM    Creatinine (POC) 0.7 01/21/2018 01:25 PM    Creatinine 0.89 08/29/2019 08:31 AM      Lab Results   Component Value Date/Time    GFR est non-AA 68 08/29/2019 08:31 AM    GFRNA, POC >60 01/21/2018 01:25 PM    GFR est AA 78 08/29/2019 08:31 AM    GFRAA, POC >60 01/21/2018 01:25 PM    Creatinine 0.89 08/29/2019 08:31 AM    Creatinine (POC) 0.7 01/21/2018 01:25 PM    BUN 22 08/29/2019 08:31 AM    Sodium 145 (H) 08/29/2019 08:31 AM    Potassium 4.7 08/29/2019 08:31 AM    Chloride 107 (H) 08/29/2019 08:31 AM    CO2 23 08/29/2019 08:31 AM       Assessment/Plan:     1. Type 2 diabetes mellitus with hyperglycemia, without long-term current use of insulin ([address]    2. Essential hypertension        1. Type 2 Diabetes Mellitus  Lab Results   Component Value Date/Time    Hemoglobin A1c 6.2 (H) 08/29/2019 08:31 AM    Hemoglobin A1c, External 6.3 04/29/2015 10:39 AM     Janumet, discontinued insulin  Pancreas not working - she is not sure if she had pancreatitis, she has switched gastroenterologists, now seeing Blair Márquez.   If she has history of pancreatitis need to discontinue Januvia  Diabetic issues reviewed : glycemic goals , written exchange diet given, low carbohydrate diet, weight control , home glucose monitoring emphasized,  hypoglycemia management and long term diabetic complications discussed. FLU annually ,Pneumovax ,aspirin daily,annual eye exam,microalbumin    2. HTN : Continue current therapy     3. Hyperlipidemia : myalgia, off statin and refused   History of hepatitis C,    4.Obesity:Body mass index is 27.59 kg/m². Discussed about the importance of exercise and carbohydrate portion control. There are no Patient Instructions on file for this visit. Thank you for allowing me to participate in the care of this patient. Sarbjit Jerry MD      Patient verbalized understanding     Voice-recognition software was used to generate this report, which may result in some phonetic-based errors in the grammar and contents. Even though attempts were made to correct all the mistakes, some may have been missed and remained in the body of the report.

## 2020-01-24 LAB
CREAT UR-MCNC: 88.5 MG/DL
EST. AVERAGE GLUCOSE BLD GHB EST-MCNC: 120 MG/DL
HBA1C MFR BLD: 5.8 % (ref 4–5.6)
MICROALBUMIN UR-MCNC: 0.88 MG/DL
MICROALBUMIN/CREAT UR-RTO: 10 MG/G (ref 0–30)

## 2020-01-27 ENCOUNTER — TELEPHONE (OUTPATIENT)
Dept: ENDOCRINOLOGY | Age: 70
End: 2020-01-27

## 2020-01-27 NOTE — TELEPHONE ENCOUNTER
Pt states her PCP mentioned pt being on insulin. Pt wanted to call and make sure she has not been prescribed insulin. Informed pt her insulin was d/c and her current med list does not have insulin listed. Pt verbalized understanding.

## 2020-02-01 DIAGNOSIS — E11.65 TYPE 2 DIABETES MELLITUS WITH HYPERGLYCEMIA, UNSPECIFIED WHETHER LONG TERM INSULIN USE (HCC): ICD-10-CM

## 2020-02-02 RX ORDER — BLOOD SUGAR DIAGNOSTIC
STRIP MISCELLANEOUS
Qty: 300 STRIP | Refills: 0 | Status: SHIPPED | OUTPATIENT
Start: 2020-02-02 | End: 2020-07-30

## 2020-02-02 RX ORDER — SITAGLIPTIN AND METFORMIN HYDROCHLORIDE 50; 1000 MG/1; MG/1
TABLET, FILM COATED, EXTENDED RELEASE ORAL
Qty: 180 TAB | Refills: 3 | Status: SHIPPED | OUTPATIENT
Start: 2020-02-02 | End: 2020-12-22

## 2020-02-13 ENCOUNTER — TELEPHONE (OUTPATIENT)
Dept: NEUROLOGY | Age: 70
End: 2020-02-13

## 2020-02-13 RX ORDER — DIMETHYL FUMARATE 240 MG/1
1 CAPSULE ORAL 2 TIMES DAILY
COMMUNITY
Start: 2019-12-06 | End: 2020-02-20

## 2020-02-13 NOTE — TELEPHONE ENCOUNTER
Attempted to contact patient. Scheduled patient for 2/18/20 @ [facility] office. Left detailed message on voicemail.

## 2020-02-13 NOTE — TELEPHONE ENCOUNTER
[address] [facility] calling to check the status of    pt's script Texadara 240 mg ( I don't see this medication listed?)  Best # [phone]

## 2020-02-20 DIAGNOSIS — G35 MS (MULTIPLE SCLEROSIS) (HCC): Primary | ICD-10-CM

## 2020-02-20 RX ORDER — DIMETHYL FUMARATE 240 MG/1
1 CAPSULE ORAL 2 TIMES DAILY
Qty: 90 CAP | Refills: 0 | Status: SHIPPED | OUTPATIENT
Start: 2020-02-20 | End: 2020-05-20

## 2020-02-20 NOTE — TELEPHONE ENCOUNTER
Called patient. She no longer sees Dr. Ariza Earing and now goes to Dr. Fredrick Brian. Dr. Fredrick Brian is closer to where she lives.

## 2020-02-20 NOTE — TELEPHONE ENCOUNTER
1. Sent pt's Tecfidera to her mail-order pharmacy  2. Pt will need to have CBC, LFTs done so I can monitor for any side effects from Tecfidera  3. Will need to see patient in 1 month for follow up visit (please schedule).

## 2020-04-07 LAB
INR, EXTERNAL: 1.1
PT, EXTERNAL: 11.4

## 2020-04-21 ENCOUNTER — TELEPHONE (OUTPATIENT)
Dept: ENDOCRINOLOGY | Age: 70
End: 2020-04-21

## 2020-04-21 DIAGNOSIS — I10 ESSENTIAL HYPERTENSION: ICD-10-CM

## 2020-04-21 DIAGNOSIS — E78.2 MIXED HYPERLIPIDEMIA: ICD-10-CM

## 2020-04-21 DIAGNOSIS — E11.65 TYPE 2 DIABETES MELLITUS WITH HYPERGLYCEMIA, UNSPECIFIED WHETHER LONG TERM INSULIN USE (HCC): Primary | ICD-10-CM

## 2020-04-21 NOTE — TELEPHONE ENCOUNTER
Order placed for pt per verbal order with read back from Dr. Jes Rosario 04/21/20    Lab slips mailed

## 2020-05-11 ENCOUNTER — OP HISTORICAL/CONVERTED ENCOUNTER (OUTPATIENT)
Dept: OTHER | Age: 70
End: 2020-05-11

## 2020-05-15 ENCOUNTER — OP HISTORICAL/CONVERTED ENCOUNTER (OUTPATIENT)
Dept: OTHER | Age: 70
End: 2020-05-15

## 2020-06-03 PROBLEM — K74.60 CIRRHOSIS OF LIVER (HCC): Status: ACTIVE | Noted: 2019-10-30

## 2020-06-03 PROBLEM — G47.33 OBSTRUCTIVE SLEEP APNEA SYNDROME: Status: ACTIVE | Noted: 2019-10-30

## 2020-06-03 PROBLEM — M19.90 ARTHRITIS: Status: ACTIVE | Noted: 2019-10-30

## 2020-06-12 ENCOUNTER — VIRTUAL VISIT (OUTPATIENT)
Dept: ENDOCRINOLOGY | Age: 70
End: 2020-06-12

## 2020-06-12 DIAGNOSIS — R63.4 WEIGHT LOSS: ICD-10-CM

## 2020-06-12 DIAGNOSIS — E78.2 MIXED HYPERLIPIDEMIA: ICD-10-CM

## 2020-06-12 DIAGNOSIS — I10 ESSENTIAL HYPERTENSION: ICD-10-CM

## 2020-06-12 DIAGNOSIS — E11.65 TYPE 2 DIABETES MELLITUS WITH HYPERGLYCEMIA, WITHOUT LONG-TERM CURRENT USE OF INSULIN (HCC): Primary | ICD-10-CM

## 2020-06-12 RX ORDER — DIMETHYL FUMARATE 240 MG/1
1 CAPSULE ORAL 2 TIMES DAILY
COMMUNITY
Start: 2020-05-27

## 2020-06-12 RX ORDER — LEVOTHYROXINE SODIUM 125 UG/1
125 TABLET ORAL
COMMUNITY
End: 2020-06-23 | Stop reason: DRUGHIGH

## 2020-06-12 NOTE — PROGRESS NOTES
Jessi Leggett MD        Patient Information  Date:6/12/2020  Name : Srinivas Hand 81 y.o.     YOB: 1950         Referred by: Radu Robins MD         Chief Complaint   Patient presents with    Diabetes   Pursuant to the emergency declaration under the [facility], [facility] waiver authority and the [employer] Act, this Virtual  Visit was conducted, with patient's consent, to reduce the patient's risk of exposure to COVID-19 . Patient  is aware that this is a billable encounter and is responsible for copays/deductibles       Services were provided through a video synchronous discussion virtually to substitute for in-person clinic visit. Place of service: Provider : Office  Patient: Home    History of Present Illness: Srinivas Hand is a 81 y.o. female here for follow-up of  Type 2 Diabetes Mellitus. Type 2 Diabetes was diagnosed 2017  . End organ effects of diabetes: none.     Cardiovascular risk factors: dyslipidemia, diabetes mellitus   She is off insulin  She is taking 1 tablet of Janumet  She recently had abdominal surgery for panniculitis  122 lbs is her weight now, lost weight  Has chronic diarrhea, reports diarrhea even before starting Janumet  Seeing gastroenterology        Current exercise: housecleaning, no regular exercise    Wt Readings from Last 3 Encounters:   01/23/20 146 lb (66.2 kg)   09/05/19 163 lb (73.9 kg)   07/23/19 166 lb (75.3 kg)       BP Readings from Last 3 Encounters:   01/23/20 127/54   09/05/19 124/58   07/23/19 132/80           Past Medical History:   Diagnosis Date    Acid reflux     Arthritis     Asthma     Chronic obstructive pulmonary disease (HCC)     Diabetes ([facility] [address]     Headache     Hepatitis C     Hypertension     Seizures ([facility] [address]     Sleep apnea      Current Outpatient Medications   Medication Sig    levothyroxine (SYNTHROID) 125 mcg tablet Take 125 mcg by mouth Daily (before breakfast).  Tecfidera 240 mg cpDR Take 1 Tab by mouth two (2) times a day.  JANUMET XR 50-1,000 mg TM24 TAKE 1 TABLET BY MOUTH TWICE DAILY WITH MEALS (Patient taking differently: Take 1 Tab by mouth nightly. Hold dose if CrCl is below 30 mL/min. Hold dose prior to contrast and for 48 hours after receiving contrast if any of the following apply:    - CrCl is below 60 mL/min,   - History of liver disease,   - Alcoholism,   - Heart failure,   - Iodinated contrast will be administered via intra-arterial.   )    ACCU-CHEK MARI PLUS TEST STRP strip USE TO TEST BLOOD GLUCOSE THREE TIMES DAILY AS DIRECTED    topiramate (TOPAMAX) 100 mg tablet Take 100 mg by mouth nightly.  aspirin delayed-release 81 mg tablet Take 81 mg by mouth nightly.  multivitamin (ONE A DAY) tablet Take 1 Tab by mouth daily.  simethicone (GAS RELIEF) 80 mg chewable tablet Take 80 mg by mouth every six (6) hours as needed for Flatulence.  diclofenac EC (VOLTAREN) 75 mg EC tablet Take 75 mg by mouth two (2) times a day.  lisinopril (PRINIVIL, ZESTRIL) 40 mg tablet Take 40 mg by mouth daily.  tiotropium (SPIRIVA WITH HANDIHALER) 18 mcg inhalation capsule Take 1 Cap by inhalation daily.  montelukast (SINGULAIR) 10 mg tablet TK 1 T PO  D    hydroCHLOROthiazide (MICROZIDE) 12.5 mg capsule TK 1 C PO D    loperamide (IMODIUM) 2 mg capsule TK ONE C PO  BID PRN    omeprazole (PRILOSEC) 20 mg capsule Take 20 mg by mouth daily.  albuterol (PROAIR HFA) 90 mcg/actuation inhaler Take  by inhalation.  fluticasone-salmeterol (ADVAIR DISKUS) 250-50 mcg/dose diskus inhaler Take 1 Puff by inhalation every twelve (12) hours.  CREON 24,000-76,000 -120,000 unit capsule TK 2 CS PO WITH MEALS    potassium 99 mg tablet Take 99 mg by mouth daily.     lancets (ONE TOUCH DELICA) 33 gauge misc USE TO TEST BLOOD SUGAR LEVELS THREE TIMES DAILY Dx Code: E11.65  glucose blood VI test strips (ONETOUCH ULTRA BLUE TEST STRIP) strip USE TO TEST BLOOD SUGAR LEVELS THREE TIMES DAILY Dx Code: E11.65    amLODIPine (NORVASC) 10 mg tablet TK 1 T PO QD    metoprolol succinate (TOPROL-XL) 50 mg XL tablet Take  by mouth daily. No current facility-administered medications for this visit. Allergies   Allergen Reactions    Iodinated Contrast Media Anaphylaxis    Gluten Diarrhea    Lactose Diarrhea       Review of Systems: Per HPI    Physical Examination:   There were no vitals taken for this visit. Estimated body mass index is 27.59 kg/m² as calculated from the following:    Height as of 1/23/20: 5' 1\" (1.549 m). -   Weight as of 1/23/20: 146 lb (66.2 kg).   - General: pleasant, no distress, good eye contact  - HEENT: no exophthalmos, no periorbital edema, EOMI  - Neck: No visible thyromegaly  - RS: Normal respiratory effort  - Musculoskeletal: no tremors  - Neurological: alert and oriented  - Psychiatric: normal mood and affect  - Skin: Normal color    Diabetic foot exam: Feb 2019     Left Foot:   Visual Exam: normal    Pulse DP: 2+ (normal)   Filament test: reduced sensation    Vibratory sensation: diminished      Right Foot:   Visual Exam: normal    Pulse DP: 2+ (normal)   Filament test: reduced sensation    Vibratory sensation: diminished      Data Reviewed:       Lab Results   Component Value Date/Time    Hemoglobin A1c 5.8 (H) 01/23/2020 10:02 AM    Hemoglobin A1c 6.2 (H) 08/29/2019 08:31 AM    Hemoglobin A1c, External 6.3 04/29/2015 10:39 AM    Glucose 141 (H) 01/23/2020 10:02 AM    Microalbumin/Creat ratio (mg/g creat) 10 01/23/2020 10:02 AM    Microalbumin,urine random 0.88 01/23/2020 10:02 AM    LDL,Direct 110 (H) 08/29/2019 08:31 AM    Creatinine (POC) 0.7 01/21/2018 01:25 PM    Creatinine 0.93 01/23/2020 10:02 AM      Lab Results   Component Value Date/Time    GFR est non-AA >60 01/23/2020 10:02 AM    GFRNA, POC >60 01/21/2018 01:25 PM    GFR est AA >60 01/23/2020 10:02 AM    GFRAA, POC >60 01/21/2018 01:25 PM    Creatinine 0.93 01/23/2020 10:02 AM    Creatinine (POC) 0.7 01/21/2018 01:25 PM    BUN 26 (H) 01/23/2020 10:02 AM    Sodium 142 01/23/2020 10:02 AM    Potassium 4.2 01/23/2020 10:02 AM    Chloride 111 (H) 01/23/2020 10:02 AM    CO2 26 01/23/2020 10:02 AM       Assessment/Plan:     1. Type 2 diabetes mellitus with hyperglycemia, without long-term current use of insulin ([facility] [address]    2. Essential hypertension        1. Type 2 Diabetes Mellitus  Lab Results   Component Value Date/Time    Hemoglobin A1c 5.8 (H) 01/23/2020 10:02 AM    Hemoglobin A1c, External 6.3 04/29/2015 10:39 AM     Janumet,  Labs  No history of pancreatitis  Gastroenterologist - Agustina Arzola. If she has history of pancreatitis need to discontinue Januvia      2. HTN : Continue current therapy     3. Hyperlipidemia : myalgia, off statin and refused     4. History of hepatitis C    5. Weight loss/chronic diarrhea  Losing weight, follow-up with PCP as well as gastroenterology      There are no Patient Instructions on file for this visit. Thank you for allowing me to participate in the care of this patient. Hamzah Michaud MD      Patient verbalized understanding     Voice-recognition software was used to generate this report, which may result in some phonetic-based errors in the grammar and contents. Even though attempts were made to correct all the mistakes, some may have been missed and remained in the body of the report.

## 2020-06-12 NOTE — PROGRESS NOTES
Janneth Brown is a 81 y.o. female here for   Chief Complaint   Patient presents with    Diabetes     Pt consented to virtual visit. 1. Have you been to the ER, urgent care clinic since your last visit? Hospitalized since your last visit? [facility] for tummy tuck in may     2. Have you seen or consulted any other health care providers outside of the [address] [place] [address] since your last visit? Include any pap smears or colon screening. -PCP

## 2020-06-15 DIAGNOSIS — R63.4 WEIGHT LOSS: ICD-10-CM

## 2020-06-15 DIAGNOSIS — E11.65 TYPE 2 DIABETES MELLITUS WITH HYPERGLYCEMIA, WITHOUT LONG-TERM CURRENT USE OF INSULIN (HCC): ICD-10-CM

## 2020-06-15 DIAGNOSIS — E78.2 MIXED HYPERLIPIDEMIA: ICD-10-CM

## 2020-06-15 DIAGNOSIS — I10 ESSENTIAL HYPERTENSION: ICD-10-CM

## 2020-06-17 LAB
BUN SERPL-MCNC: 42 MG/DL (ref 8–27)
BUN/CREAT SERPL: 51 (ref 12–28)
CALCIUM SERPL-MCNC: 12.7 MG/DL (ref 8.7–10.3)
CHLORIDE SERPL-SCNC: 106 MMOL/L (ref 96–106)
CO2 SERPL-SCNC: 21 MMOL/L (ref 20–29)
CREAT SERPL-MCNC: 0.82 MG/DL (ref 0.57–1)
EST. AVERAGE GLUCOSE BLD GHB EST-MCNC: 111 MG/DL
GLUCOSE SERPL-MCNC: 118 MG/DL (ref 65–99)
HBA1C MFR BLD: 5.5 % (ref 4.8–5.6)
POTASSIUM SERPL-SCNC: 3.7 MMOL/L (ref 3.5–5.2)
PTH-INTACT SERPL-MCNC: 47 PG/ML (ref 15–65)
SODIUM SERPL-SCNC: 141 MMOL/L (ref 134–144)
T4 FREE SERPL-MCNC: 3.2 NG/DL (ref 0.82–1.77)
TSH SERPL DL<=0.005 MIU/L-ACNC: 0.01 UIU/ML (ref 0.45–4.5)

## 2020-06-18 NOTE — PROGRESS NOTES
She is getting more thyroid medicine which also can cause weight loss  Please confirm the dose of the thyroid medicine, usually managed by PCP  According to the note she is on 125 mcg, decrease it to 75 mcg  Calcium is also high, avoid Tums and Rolaids  Avoid dehydration    Need to recheck labs in 4 weeks, TSH, free T4, BMP, PTH,    Will send a copy to primary care physician, to discuss with PCP also

## 2020-06-22 ENCOUNTER — TELEPHONE (OUTPATIENT)
Dept: ENDOCRINOLOGY | Age: 70
End: 2020-06-22

## 2020-06-22 DIAGNOSIS — E03.9 ACQUIRED HYPOTHYROIDISM: ICD-10-CM

## 2020-06-22 DIAGNOSIS — E11.65 TYPE 2 DIABETES MELLITUS WITH HYPERGLYCEMIA, WITHOUT LONG-TERM CURRENT USE OF INSULIN (HCC): Primary | ICD-10-CM

## 2020-06-22 NOTE — TELEPHONE ENCOUNTER
----- Message from Guido Triana MD sent at 6/18/2020  3:32 PM EDT -----  She is getting more thyroid medicine which also can cause weight loss  Please confirm the dose of the thyroid medicine, usually managed by PCP  According to the note she is on 125 mcg, decrease it to 75 mcg  Calcium is also high, avoid Tums and Rolaids  Avoid dehydration    Need to recheck labs in 4 weeks, TSH, free T4, BMP, PTH,    Will send a copy to primary care physician, to discuss with PCP also

## 2020-06-22 NOTE — TELEPHONE ENCOUNTER
Per Dr. Kermit Sewell, informed pt of result note, as noted above. Pt verbalized understanding and stated she is on 175 mcg. Informed her I will let Dr. Kermit Sewell know as that is more than what we had and neetu her back with the dose she wants her on. No further questions or concerns at this time. Order placed for pt per verbal order with read back from Dr. Kermit Sewell 06/22/20    Lab slips mailed    Copy of results faxed to PCP.

## 2020-06-23 RX ORDER — LEVOTHYROXINE SODIUM 137 UG/1
137 TABLET ORAL
Qty: 90 TAB | Refills: 3 | Status: SHIPPED | OUTPATIENT
Start: 2020-06-23 | End: 2020-07-27

## 2020-06-23 NOTE — TELEPHONE ENCOUNTER
Informed pt of Dr. Guanako Raya note. Pt verbalized understanding with no further questions or concerns at this time.

## 2020-07-20 DIAGNOSIS — E11.65 TYPE 2 DIABETES MELLITUS WITH HYPERGLYCEMIA, WITHOUT LONG-TERM CURRENT USE OF INSULIN (HCC): ICD-10-CM

## 2020-07-20 DIAGNOSIS — E03.9 ACQUIRED HYPOTHYROIDISM: ICD-10-CM

## 2020-07-21 LAB
BUN SERPL-MCNC: 26 MG/DL (ref 8–27)
BUN/CREAT SERPL: 35 (ref 12–28)
CALCIUM SERPL-MCNC: 11.1 MG/DL (ref 8.7–10.3)
CHLORIDE SERPL-SCNC: 110 MMOL/L (ref 96–106)
CO2 SERPL-SCNC: 20 MMOL/L (ref 20–29)
CREAT SERPL-MCNC: 0.75 MG/DL (ref 0.57–1)
GLUCOSE SERPL-MCNC: 106 MG/DL (ref 65–99)
POTASSIUM SERPL-SCNC: 3.8 MMOL/L (ref 3.5–5.2)
PTH-INTACT SERPL-MCNC: 37 PG/ML (ref 15–65)
SODIUM SERPL-SCNC: 142 MMOL/L (ref 134–144)
T4 FREE SERPL-MCNC: 2.46 NG/DL (ref 0.82–1.77)
TSH SERPL DL<=0.005 MIU/L-ACNC: <0.006 UIU/ML (ref 0.45–4.5)

## 2020-07-22 NOTE — PROGRESS NOTES
Confirm the dose of levothyroxine, we decreased it to 137 mcg has she been taking 137 last 4 weeks  It has improved but still she is getting more thyroid medicine. Decrease levothyroxine to 88 mcg.   Make sure she is not taking biotin, if she is she has to hold biotin which is a hair nail supplement 3 days before the blood test    Labs TSH, free T4, BMP in 6 weeks

## 2020-07-23 ENCOUNTER — TELEPHONE (OUTPATIENT)
Dept: ENDOCRINOLOGY | Age: 70
End: 2020-07-23

## 2020-07-23 DIAGNOSIS — E11.65 TYPE 2 DIABETES MELLITUS WITH HYPERGLYCEMIA, WITHOUT LONG-TERM CURRENT USE OF INSULIN (HCC): Primary | ICD-10-CM

## 2020-07-23 DIAGNOSIS — E03.9 ACQUIRED HYPOTHYROIDISM: ICD-10-CM

## 2020-07-23 NOTE — TELEPHONE ENCOUNTER
----- Message from Florencia Lee MD sent at 7/22/2020  1:23 PM EDT -----  Confirm the dose of levothyroxine, we decreased it to 137 mcg has she been taking 137 last 4 weeks  It has improved but still she is getting more thyroid medicine. Decrease levothyroxine to 88 mcg.   Make sure she is not taking biotin, if she is she has to hold biotin which is a hair nail supplement 3 days before the blood test    Labs TSH, free T4, BMP in 6 weeks

## 2020-07-23 NOTE — TELEPHONE ENCOUNTER
Attempted to call. Unsuccessful. Left [facility] [place] for [facility] to give us a call back at the office. A callback number was left.

## 2020-07-24 NOTE — TELEPHONE ENCOUNTER
Per Dr. Arielle Miller, informed pt of result note, as noted above. Pt verbalized understanding and confirmed she has been taking the 137 mcg and is not taking any biotin or hair, skin and nail supplements. No further questions or concerns at this time.     Order placed for pt per verbal order with read back from Dr. Arielle Miller 07/24/20    Lab slips mailed

## 2020-07-27 DIAGNOSIS — E03.9 ACQUIRED HYPOTHYROIDISM: Primary | ICD-10-CM

## 2020-07-27 RX ORDER — LEVOTHYROXINE SODIUM 88 UG/1
88 TABLET ORAL
Qty: 90 TAB | Refills: 3 | Status: SHIPPED | OUTPATIENT
Start: 2020-07-27 | End: 2020-12-22

## 2020-07-27 NOTE — TELEPHONE ENCOUNTER
Patient states she had blood work done and is waiting for thyroid medication to be called to pharmacy she states dosage has been changed since last refilled in June.  Please call patient to discuss

## 2020-07-30 ENCOUNTER — OFFICE VISIT (OUTPATIENT)
Dept: SURGERY | Age: 70
End: 2020-07-30
Payer: MEDICARE

## 2020-07-30 VITALS
DIASTOLIC BLOOD PRESSURE: 61 MMHG | SYSTOLIC BLOOD PRESSURE: 123 MMHG | OXYGEN SATURATION: 98 % | WEIGHT: 118 LBS | HEART RATE: 91 BPM | TEMPERATURE: 98.6 F | HEIGHT: 61 IN | BODY MASS INDEX: 22.28 KG/M2

## 2020-07-30 DIAGNOSIS — M79.3 PANNICULITIS: ICD-10-CM

## 2020-07-30 DIAGNOSIS — Z09 POSTOPERATIVE EXAMINATION: Primary | ICD-10-CM

## 2020-07-30 PROCEDURE — 99024 POSTOP FOLLOW-UP VISIT: CPT | Performed by: PHYSICIAN ASSISTANT

## 2020-07-30 RX ORDER — TOPIRAMATE 50 MG/1
2 TABLET, FILM COATED ORAL
COMMUNITY
Start: 2020-07-18 | End: 2022-01-05

## 2020-07-30 RX ORDER — SITAGLIPTIN AND METFORMIN HYDROCHLORIDE 50; 1000 MG/1; MG/1
TABLET, FILM COATED, EXTENDED RELEASE ORAL
COMMUNITY
Start: 2020-05-23 | End: 2020-12-22 | Stop reason: SDUPTHER

## 2020-07-30 RX ORDER — OMEPRAZOLE 20 MG/1
1 CAPSULE, DELAYED RELEASE ORAL DAILY
COMMUNITY
Start: 2020-05-13

## 2020-07-30 RX ORDER — MONTELUKAST SODIUM 10 MG/1
1 TABLET ORAL DAILY
COMMUNITY
Start: 2020-05-13

## 2020-07-30 RX ORDER — ALBUTEROL SULFATE 90 UG/1
AEROSOL, METERED RESPIRATORY (INHALATION)
COMMUNITY
Start: 2020-04-25

## 2020-07-30 RX ORDER — LISINOPRIL 40 MG/1
1 TABLET ORAL DAILY
COMMUNITY
Start: 2020-05-13

## 2020-07-30 RX ORDER — HYDROCHLOROTHIAZIDE 12.5 MG/1
1 CAPSULE ORAL DAILY
COMMUNITY
Start: 2020-05-13

## 2020-07-30 NOTE — PROGRESS NOTES
General Surgery Follow Up    Subjective:   Patient is a 51-year-old woman with a history of cirrhosis secondary to alcohol abuse and hepatitis C. She underwent treatment for hepatitis C and no longer drinks alcohol. She states that her cirrhosis is stable. She presents to my office for evaluation for panniculectomy. She has chronic moisture and skin irritation underneath her pannus. She has lost a significant amount of weight which is resulted in excessive, low hanging pannus with chronic skin irritation, excoriation. She is tried over-the-counter remedies such as drying pads, cornstarch to reduce the frequency of infections and skin irritation. She is interested in panniculectomy to avoid chronic skin infections. She denies requiring recurrent paracenteses. She denies hematemesis or hematochezia. 5/28/2020   Patient returns to the office for follow-up. Home health is been coming to her home for dressing changes. She denies any fevers or chills. She states that her drains are putting out less than 30 cc per side over 24 hours. She denies having any significant pain. 6/4/2020   Patient seen and examined. She denies nausea, vomiting, fevers, chills. She denies significant pain. She has some minimal drainage around her umbilicus. She has noticed some redness around her panniculectomy incision. 6/11/2020   Ms. Paolo Fine returns for continued follow up after her panniculectomy on May 15, 2020. She is doing well overall. She completed the course of Keflex as prescribed. Her incision is looking better. There is a small amount of drainage but she does not need to cover the area with anything other than her underwear. She denies fever and chills. 6/22/2020   Ms. Paolo Fine returns for continued follow up after her panniculectomy on May 15, 2020. She is doing well. Her incision is gradually healing. She denies fever, chills, and pain. 7/13/2020   Ms. Paolo Fine returns for continued follow up after her panniculectomy on May 15, 2020. She is doing well. Her incision was healed until a few days ago when she reports the home health nurse \"poked at it\" and two small areas opened up superficially. She denies fever, chills, drainage, and pain. The rest of her large incision and her umbilical incision have healed well. 7/30/2020  Ms. Blaire Luis returns for continued follow up after her panniculectomy on May 15, 2020. She is doing well. Her incisions are healed. Appetite is good. Bowel movements are regular. The patient is voiding without difficulty. Patient denies fever, chills, nausea, vomiting, diarrhea, constipation, and issues with incisions. The patient is not having any pain. .    Objective:     Visit Vitals  /61   Pulse 91   Temp 98.6 °F (37 °C)   Ht 5' 1\" (1.549 m)   Wt 118 lb (53.5 kg)   SpO2 98%   BMI 22.30 kg/m²       General:  alert, cooperative, no distress, appears stated age   Abdomen: soft, bowel sounds active, non-tender   Incision:   healing well, no drainage, no erythema, no hernia, no seroma, no swelling, no dehiscence, incision well approximated     Assessment:     Doing well postoperatively. Plan:     1. Continue any current medications. 2. Wound care discussed. 3. Pt is to increase activities as tolerated.   4. F/u PRN

## 2020-08-31 DIAGNOSIS — E03.9 ACQUIRED HYPOTHYROIDISM: ICD-10-CM

## 2020-09-02 LAB
BUN SERPL-MCNC: 19 MG/DL (ref 8–27)
BUN/CREAT SERPL: 22 (ref 12–28)
CALCIUM SERPL-MCNC: 10.1 MG/DL (ref 8.7–10.3)
CHLORIDE SERPL-SCNC: 110 MMOL/L (ref 96–106)
CO2 SERPL-SCNC: 23 MMOL/L (ref 20–29)
CREAT SERPL-MCNC: 0.86 MG/DL (ref 0.57–1)
GLUCOSE SERPL-MCNC: 97 MG/DL (ref 65–99)
POTASSIUM SERPL-SCNC: 3.6 MMOL/L (ref 3.5–5.2)
SODIUM SERPL-SCNC: 144 MMOL/L (ref 134–144)
T4 FREE SERPL-MCNC: 1.74 NG/DL (ref 0.82–1.77)
TSH SERPL DL<=0.005 MIU/L-ACNC: 0.01 UIU/ML (ref 0.45–4.5)

## 2020-12-22 ENCOUNTER — OFFICE VISIT (OUTPATIENT)
Dept: ENDOCRINOLOGY | Age: 70
End: 2020-12-22
Payer: MEDICARE

## 2020-12-22 VITALS
SYSTOLIC BLOOD PRESSURE: 122 MMHG | RESPIRATION RATE: 18 BRPM | WEIGHT: 117.4 LBS | DIASTOLIC BLOOD PRESSURE: 46 MMHG | OXYGEN SATURATION: 100 % | BODY MASS INDEX: 22.16 KG/M2 | HEIGHT: 61 IN | TEMPERATURE: 98.5 F | HEART RATE: 71 BPM

## 2020-12-22 DIAGNOSIS — E05.80 IATROGENIC HYPERTHYROIDISM: ICD-10-CM

## 2020-12-22 DIAGNOSIS — I10 ESSENTIAL HYPERTENSION: ICD-10-CM

## 2020-12-22 DIAGNOSIS — E11.65 TYPE 2 DIABETES MELLITUS WITH HYPERGLYCEMIA, WITHOUT LONG-TERM CURRENT USE OF INSULIN (HCC): Primary | ICD-10-CM

## 2020-12-22 PROCEDURE — G8752 SYS BP LESS 140: HCPCS | Performed by: INTERNAL MEDICINE

## 2020-12-22 PROCEDURE — G8510 SCR DEP NEG, NO PLAN REQD: HCPCS | Performed by: INTERNAL MEDICINE

## 2020-12-22 PROCEDURE — 1090F PRES/ABSN URINE INCON ASSESS: CPT | Performed by: INTERNAL MEDICINE

## 2020-12-22 PROCEDURE — 3017F COLORECTAL CA SCREEN DOC REV: CPT | Performed by: INTERNAL MEDICINE

## 2020-12-22 PROCEDURE — 1101F PT FALLS ASSESS-DOCD LE1/YR: CPT | Performed by: INTERNAL MEDICINE

## 2020-12-22 PROCEDURE — 3044F HG A1C LEVEL LT 7.0%: CPT | Performed by: INTERNAL MEDICINE

## 2020-12-22 PROCEDURE — 99214 OFFICE O/P EST MOD 30 MIN: CPT | Performed by: INTERNAL MEDICINE

## 2020-12-22 PROCEDURE — G8427 DOCREV CUR MEDS BY ELIG CLIN: HCPCS | Performed by: INTERNAL MEDICINE

## 2020-12-22 PROCEDURE — G8754 DIAS BP LESS 90: HCPCS | Performed by: INTERNAL MEDICINE

## 2020-12-22 PROCEDURE — 2022F DILAT RTA XM EVC RTNOPTHY: CPT | Performed by: INTERNAL MEDICINE

## 2020-12-22 PROCEDURE — G8400 PT W/DXA NO RESULTS DOC: HCPCS | Performed by: INTERNAL MEDICINE

## 2020-12-22 PROCEDURE — G8420 CALC BMI NORM PARAMETERS: HCPCS | Performed by: INTERNAL MEDICINE

## 2020-12-22 PROCEDURE — G8536 NO DOC ELDER MAL SCRN: HCPCS | Performed by: INTERNAL MEDICINE

## 2020-12-22 RX ORDER — VITAMIN E CAP 100 UNIT 100 UNIT
100 CAP ORAL DAILY
COMMUNITY
End: 2022-06-06

## 2020-12-22 RX ORDER — NADOLOL 20 MG/1
40 TABLET ORAL 2 TIMES DAILY
COMMUNITY

## 2020-12-22 RX ORDER — MONTELUKAST SODIUM 4 MG/1
1 TABLET, CHEWABLE ORAL 3 TIMES DAILY
COMMUNITY

## 2020-12-22 NOTE — PROGRESS NOTES
1. Have you been to the ER, urgent care clinic since your last visit? No  Hospitalized since your last visit? No    2. Have you seen or consulted any other health care providers outside of the [address] [place] [address] since your last visit? Include any pap smears or colon screening. No    Wt Readings from Last 3 Encounters:   12/22/20 117 lb 6.4 oz (53.3 kg)   07/30/20 118 lb (53.5 kg)   01/23/20 146 lb (66.2 kg)     Temp Readings from Last 3 Encounters:   12/22/20 98.5 °F (36.9 °C) (Oral)   07/30/20 98.6 °F (37 °C)   01/23/20 96.2 °F (35.7 °C) (Oral)     BP Readings from Last 3 Encounters:   12/22/20 (!) 122/46   07/30/20 123/61   01/23/20 127/54     Pulse Readings from Last 3 Encounters:   12/22/20 71   07/30/20 91   01/23/20 79     Lab Results   Component Value Date/Time    Hemoglobin A1c 5.5 06/16/2020 09:50 AM    Hemoglobin A1c, External 6.3 04/29/2015 10:39 AM     Patient has meter today.

## 2020-12-25 PROBLEM — E05.80 IATROGENIC HYPERTHYROIDISM: Status: ACTIVE | Noted: 2020-12-25

## 2021-01-15 DIAGNOSIS — E05.90 HYPERTHYROIDISM: Primary | ICD-10-CM

## 2021-01-15 LAB
T3 SERPL-MCNC: 197 NG/DL (ref 71–180)
T4 FREE SERPL-MCNC: 1.44 NG/DL (ref 0.82–1.77)
TSH SERPL DL<=0.005 MIU/L-ACNC: 0.01 UIU/ML (ref 0.45–4.5)

## 2021-01-19 ENCOUNTER — TELEPHONE (OUTPATIENT)
Dept: ENDOCRINOLOGY | Age: 71
End: 2021-01-19

## 2021-01-19 NOTE — TELEPHONE ENCOUNTER
----- Message from Julienne Dancer sent at 1/19/2021 11:20 AM EST -----  Regarding: Dr. Lacie Tracy first and last name: N/A      Reason for call: Lab Results      Callback required yes/no and why: yes      Best contact number(s): [phone]      Details to clarify the request: Pt would like a nurse to call her back with her lab results and if someone would be able to explain the test that she is having done next week.        Julienne Dancer

## 2021-01-20 NOTE — TELEPHONE ENCOUNTER
Informed pt that a [employer] message was sent on 01/15/\2021, read message to pt: \"Thyroid is still overactive even after stopping the medicine   Have to order thyroid scan to find out more to see if you have hyperthyroidism     I have ordered scan/test and if you do not hear from the hospital in 3- 4 business days  please call the number [phone] to speak with the scheduling team directly\"    Pt verbalized understanding.

## 2021-01-27 ENCOUNTER — HOSPITAL ENCOUNTER (OUTPATIENT)
Dept: NUCLEAR MEDICINE | Age: 71
Discharge: HOME | End: 2021-01-27
Payer: MEDICARE

## 2021-01-27 DIAGNOSIS — E05.90 HYPERTHYROIDISM: ICD-10-CM

## 2021-01-27 PROCEDURE — 78014 THYROID IMAGING W/BLOOD FLOW: CPT

## 2021-01-28 ENCOUNTER — HOSPITAL ENCOUNTER (OUTPATIENT)
Dept: NUCLEAR MEDICINE | Age: 71
Discharge: HOME | End: 2021-01-28

## 2021-01-28 NOTE — PROGRESS NOTES
Thyroid is overactive now based on the scan  She needs a different kind of thyroid medicine to normalize thyroid  Methimazole 10 mg in the morning  Continue to stay off levothyroxine  Labs TSH, free T4 before next visit

## 2021-01-29 ENCOUNTER — TELEPHONE (OUTPATIENT)
Dept: ENDOCRINOLOGY | Age: 71
End: 2021-01-29

## 2021-01-29 DIAGNOSIS — E05.90 HYPERTHYROIDISM: Primary | ICD-10-CM

## 2021-01-29 RX ORDER — METHIMAZOLE 10 MG/1
10 TABLET ORAL DAILY
Qty: 30 TAB | Refills: 5 | Status: SHIPPED | OUTPATIENT
Start: 2021-01-29 | End: 2021-04-26 | Stop reason: ALTCHOICE

## 2021-01-29 NOTE — TELEPHONE ENCOUNTER
Per Dr. Kermit Sewell, informed pt of result note, as noted above. Pt verbalized understanding with no further questions or concerns at this time.

## 2021-01-29 NOTE — TELEPHONE ENCOUNTER
----- Message from Thelma Judd MD sent at 1/28/2021  1:54 PM EST -----  Thyroid is overactive now based on the scan  She needs a different kind of thyroid medicine to normalize thyroid  Methimazole 10 mg in the morning  Continue to stay off levothyroxine  Labs TSH, free T4 before next visit

## 2021-04-02 LAB
T3 SERPL-MCNC: 135 NG/DL (ref 71–180)
T4 FREE SERPL-MCNC: 0.73 NG/DL (ref 0.82–1.77)
TSH SERPL DL<=0.005 MIU/L-ACNC: 6.53 UIU/ML (ref 0.45–4.5)

## 2021-04-26 ENCOUNTER — OFFICE VISIT (OUTPATIENT)
Dept: ENDOCRINOLOGY | Age: 71
End: 2021-04-26
Payer: MEDICARE

## 2021-04-26 VITALS
SYSTOLIC BLOOD PRESSURE: 147 MMHG | DIASTOLIC BLOOD PRESSURE: 62 MMHG | WEIGHT: 137 LBS | OXYGEN SATURATION: 100 % | BODY MASS INDEX: 25.86 KG/M2 | TEMPERATURE: 97.6 F | HEART RATE: 66 BPM | HEIGHT: 61 IN | RESPIRATION RATE: 18 BRPM

## 2021-04-26 DIAGNOSIS — E11.65 TYPE 2 DIABETES MELLITUS WITH HYPERGLYCEMIA, WITHOUT LONG-TERM CURRENT USE OF INSULIN (HCC): Primary | ICD-10-CM

## 2021-04-26 DIAGNOSIS — E05.90 HYPERTHYROIDISM: ICD-10-CM

## 2021-04-26 DIAGNOSIS — I10 ESSENTIAL HYPERTENSION: ICD-10-CM

## 2021-04-26 LAB — HBA1C MFR BLD HPLC: 6.1 %

## 2021-04-26 PROCEDURE — G8427 DOCREV CUR MEDS BY ELIG CLIN: HCPCS | Performed by: INTERNAL MEDICINE

## 2021-04-26 PROCEDURE — G8754 DIAS BP LESS 90: HCPCS | Performed by: INTERNAL MEDICINE

## 2021-04-26 PROCEDURE — 3044F HG A1C LEVEL LT 7.0%: CPT | Performed by: INTERNAL MEDICINE

## 2021-04-26 PROCEDURE — G8419 CALC BMI OUT NRM PARAM NOF/U: HCPCS | Performed by: INTERNAL MEDICINE

## 2021-04-26 PROCEDURE — G8536 NO DOC ELDER MAL SCRN: HCPCS | Performed by: INTERNAL MEDICINE

## 2021-04-26 PROCEDURE — 2022F DILAT RTA XM EVC RTNOPTHY: CPT | Performed by: INTERNAL MEDICINE

## 2021-04-26 PROCEDURE — G8432 DEP SCR NOT DOC, RNG: HCPCS | Performed by: INTERNAL MEDICINE

## 2021-04-26 PROCEDURE — 3017F COLORECTAL CA SCREEN DOC REV: CPT | Performed by: INTERNAL MEDICINE

## 2021-04-26 PROCEDURE — 99214 OFFICE O/P EST MOD 30 MIN: CPT | Performed by: INTERNAL MEDICINE

## 2021-04-26 PROCEDURE — G8400 PT W/DXA NO RESULTS DOC: HCPCS | Performed by: INTERNAL MEDICINE

## 2021-04-26 PROCEDURE — 1101F PT FALLS ASSESS-DOCD LE1/YR: CPT | Performed by: INTERNAL MEDICINE

## 2021-04-26 PROCEDURE — 83036 HEMOGLOBIN GLYCOSYLATED A1C: CPT | Performed by: INTERNAL MEDICINE

## 2021-04-26 PROCEDURE — 1090F PRES/ABSN URINE INCON ASSESS: CPT | Performed by: INTERNAL MEDICINE

## 2021-04-26 PROCEDURE — G8753 SYS BP > OR = 140: HCPCS | Performed by: INTERNAL MEDICINE

## 2021-04-26 RX ORDER — METHIMAZOLE 5 MG/1
5 TABLET ORAL DAILY
Qty: 30 TAB | Refills: 3 | Status: SHIPPED | OUTPATIENT
Start: 2021-04-26 | End: 2021-07-14

## 2021-04-26 NOTE — PROGRESS NOTES
Latanya Palmer MD        Patient Information  Date:4/26/2021  Name : Gilda Azevedo 78 y.o.     YOB: 1950         Referred by: Belle Matthews MD         Chief Complaint   Patient presents with    Diabetes     History of Present Illness: Gilda Azevedo is a 78 y.o. female here for follow-up of  Type 2 Diabetes Mellitus. Type 2 Diabetes was diagnosed 2017  . HYperthyroidism - on MMI   Gaining weight back   off LT4  Cardiovascular risk factors: dyslipidemia, diabetes mellitus   She is off insulin          Current exercise: housecleaning, no regular exercise    Wt Readings from Last 3 Encounters:   04/26/21 137 lb (62.1 kg)   12/22/20 117 lb 6.4 oz (53.3 kg)   07/30/20 118 lb (53.5 kg)       BP Readings from Last 3 Encounters:   04/26/21 (!) 147/62   12/22/20 (!) 122/46   07/30/20 123/61           Past Medical History:   Diagnosis Date    Acid reflux     Arthritis     Asthma     Back pain     Chronic obstructive pulmonary disease (HCC)     Cirrhosis of liver (HCC)     Diabetes (HCC)     Headache     Hepatitis C     Hiatal hernia     Hypertension     Multiple sclerosis (HCC)     Seizures (HCC)     Sleep apnea      Current Outpatient Medications   Medication Sig    suvorexant (Belsomra) 10 mg tablet Take 5 mg by mouth nightly.  methIMAzole (TAPAZOLE) 10 mg tablet Take 1 Tab by mouth daily. Stop Levothyroxine    colestipoL (COLESTID) 1 gram tablet Take 1 g by mouth three (3) times daily.  lipase-protease-amylase (CREON 24,000) 24,000-76,000 -120,000 unit capsule Take 2 Caps by mouth three (3) times daily (with meals).  nadoloL (CORGARD) 20 mg tablet Take 40 mg by mouth daily.  vitamin e (E GEMS) 100 unit capsule Take 100 Units by mouth daily.  albuterol (PROVENTIL HFA, VENTOLIN HFA, PROAIR HFA) 90 mcg/actuation inhaler     hydroCHLOROthiazide (MICROZIDE) 12.5 mg capsule Take 1 Cap by mouth daily.     lisinopriL (PRINIVIL, ZESTRIL) 40 mg tablet Take 1 Tab by mouth daily.  montelukast (SINGULAIR) 10 mg tablet Take 1 Tab by mouth daily.  omeprazole (PRILOSEC) 20 mg capsule Take 1 Cap by mouth daily.  Tecfidera 240 mg cpDR Take 1 Tab by mouth two (2) times a day.  aspirin delayed-release 81 mg tablet Take 81 mg by mouth nightly.  multivitamin (ONE A DAY) tablet Take 1 Tab by mouth daily.  simethicone (GAS RELIEF) 80 mg chewable tablet Take 80 mg by mouth every six (6) hours as needed for Flatulence.  loperamide (IMODIUM) 2 mg capsule TK ONE C PO  BID PRN    fluticasone-salmeterol (ADVAIR DISKUS) 250-50 mcg/dose diskus inhaler Take 1 Puff by inhalation every twelve (12) hours.  topiramate (TOPAMAX) 50 mg tablet Take 2 Tabs by mouth nightly.  tiotropium (SPIRIVA WITH HANDIHALER) 18 mcg inhalation capsule Take 1 Cap by inhalation daily. No current facility-administered medications for this visit. Allergies   Allergen Reactions    Iodinated Contrast Media Anaphylaxis    Gluten Diarrhea    Lactose Diarrhea    Milk Containing Products Unknown (comments)       Review of Systems: Per HPI    Physical Examination:   Blood pressure (!) 147/62, pulse 66, temperature 97.6 °F (36.4 °C), temperature source Oral, resp. rate 18, height 5' 1\" (1.549 m), weight 137 lb (62.1 kg), SpO2 100 %. Estimated body mass index is 25.89 kg/m² as calculated from the following:    Height as of this encounter: 5' 1\" (1.549 m). -   Weight as of this encounter: 137 lb (62.1 kg).   - General: pleasant, no distress, good eye contact  - HEENT: no exophthalmos, no periorbital edema, EOMI  - Neck: No thyromegaly  - CVS: S1-S2 regular  - RS: Normal respiratory effort  - Musculoskeletal: no tremors  - Neurological: alert and oriented  - Psychiatric: normal mood and affect  - Skin: Normal color          Data Reviewed:       Lab Results   Component Value Date/Time    Hemoglobin A1c 5.5 06/16/2020 09:50 AM    Hemoglobin A1c 5.8 (H) 01/23/2020 10:02 AM    Hemoglobin A1c 6.2 (H) 08/29/2019 08:31 AM    Hemoglobin A1c, External 6.3 04/29/2015 10:39 AM    Glucose 97 09/01/2020 07:08 AM    Microalbumin/Creat ratio (mg/g creat) 10 01/23/2020 10:02 AM    Microalbumin,urine random 0.88 01/23/2020 10:02 AM    LDL,Direct 110 (H) 08/29/2019 08:31 AM    Creatinine (POC) 0.7 01/21/2018 01:25 PM    Creatinine 0.86 09/01/2020 07:08 AM      Lab Results   Component Value Date/Time    GFR est non-AA 70 09/01/2020 07:08 AM    GFRNA, POC >60 01/21/2018 01:25 PM    GFR est AA 81 09/01/2020 07:08 AM    GFRAA, POC >60 01/21/2018 01:25 PM    Creatinine 0.86 09/01/2020 07:08 AM    Creatinine (POC) 0.7 01/21/2018 01:25 PM    BUN 19 09/01/2020 07:08 AM    Sodium 144 09/01/2020 07:08 AM    Potassium 3.6 09/01/2020 07:08 AM    Chloride 110 (H) 09/01/2020 07:08 AM    CO2 23 09/01/2020 07:08 AM    PTH, Intact 37 07/20/2020 07:31 AM       Assessment/Plan:     1. Type 2 diabetes mellitus with hyperglycemia, without long-term current use of insulin ([address]    2. Essential hypertension        1. Type 2 Diabetes Mellitus  Lab Results   Component Value Date/Time    Hemoglobin A1c 5.5 06/16/2020 09:50 AM    Hemoglobin A1c, External 6.3 04/29/2015 10:39 AM     Janumet stopped Dec 2020     No history of pancreatitis  Gastroenterologist - Celeste Damon. She was diagnosed with exogenous pancreatic insufficiency, on Creon      2. HTN : Continue current therapy     3. Hyperlipidemia : myalgia, off statin and refused     4. History of hepatitis C    5. Hashitoxicosis - off LT4  NM scan - 45 % diffuse uptake  On MMI         There are no Patient Instructions on file for this visit. Thank you for allowing me to participate in the care of this patient. Xochitl Garibay MD      Patient verbalized understanding     Voice-recognition software was used to generate this report, which may result in some phonetic-based errors in the grammar and contents.   Even though attempts were made to correct all the mistakes, some may have been missed and remained in the body of the report.

## 2021-04-26 NOTE — LETTER
5/1/2021 Patient: Vera Houston YOB: 1950 Date of Visit: 4/26/2021 Gilford Mackintosh, MD 
[address] [place] [id number] Via Fax: [phone] Dear Gilford Mackintosh, MD, Thank you for referring Ms. Shital Souza to [address] [place] [address] for evaluation. My notes for this consultation are attached. If you have questions, please do not hesitate to call me. I look forward to following your patient along with you. Sincerely, Yun Fine MD

## 2021-04-26 NOTE — PROGRESS NOTES
Elzbieta Otero is a 78 y.o. female here for   Chief Complaint   Patient presents with    Diabetes       1. Have you been to the ER, urgent care clinic since your last visit? Hospitalized since your last visit? -no    2. Have you seen or consulted any other health care providers outside of the [address] [place] [address] since your last visit?   Include any pap smears or colon screening.-no

## 2021-07-14 DIAGNOSIS — E05.90 HYPERTHYROIDISM: ICD-10-CM

## 2021-07-14 RX ORDER — METHIMAZOLE 5 MG/1
TABLET ORAL
Qty: 30 TABLET | Refills: 3 | Status: SHIPPED | OUTPATIENT
Start: 2021-07-14 | End: 2022-01-05

## 2021-08-24 LAB
BUN SERPL-MCNC: 15 MG/DL (ref 8–27)
BUN/CREAT SERPL: 18 (ref 12–28)
CALCIUM SERPL-MCNC: 9.9 MG/DL (ref 8.7–10.3)
CHLORIDE SERPL-SCNC: 105 MMOL/L (ref 96–106)
CO2 SERPL-SCNC: 24 MMOL/L (ref 20–29)
CREAT SERPL-MCNC: 0.84 MG/DL (ref 0.57–1)
EST. AVERAGE GLUCOSE BLD GHB EST-MCNC: 163 MG/DL
GLUCOSE SERPL-MCNC: 188 MG/DL (ref 65–99)
HBA1C MFR BLD: 7.3 % (ref 4.8–5.6)
POTASSIUM SERPL-SCNC: 4 MMOL/L (ref 3.5–5.2)
SODIUM SERPL-SCNC: 141 MMOL/L (ref 134–144)
T4 FREE SERPL-MCNC: 1.51 NG/DL (ref 0.82–1.77)
TSH SERPL DL<=0.005 MIU/L-ACNC: 1.64 UIU/ML (ref 0.45–4.5)

## 2021-08-30 ENCOUNTER — DOCUMENTATION ONLY (OUTPATIENT)
Dept: ENDOCRINOLOGY | Age: 71
End: 2021-08-30

## 2021-08-30 ENCOUNTER — OFFICE VISIT (OUTPATIENT)
Dept: ENDOCRINOLOGY | Age: 71
End: 2021-08-30
Payer: MEDICARE

## 2021-08-30 VITALS
HEART RATE: 67 BPM | TEMPERATURE: 98 F | SYSTOLIC BLOOD PRESSURE: 134 MMHG | BODY MASS INDEX: 29.59 KG/M2 | DIASTOLIC BLOOD PRESSURE: 60 MMHG | RESPIRATION RATE: 12 BRPM | OXYGEN SATURATION: 97 % | WEIGHT: 156.7 LBS | HEIGHT: 61 IN

## 2021-08-30 DIAGNOSIS — E06.3 HASHITOXICOSIS: ICD-10-CM

## 2021-08-30 DIAGNOSIS — I10 ESSENTIAL HYPERTENSION: ICD-10-CM

## 2021-08-30 DIAGNOSIS — E05.80 HASHITOXICOSIS: ICD-10-CM

## 2021-08-30 DIAGNOSIS — E11.65 TYPE 2 DIABETES MELLITUS WITH HYPERGLYCEMIA, WITHOUT LONG-TERM CURRENT USE OF INSULIN (HCC): Primary | ICD-10-CM

## 2021-08-30 PROCEDURE — 1101F PT FALLS ASSESS-DOCD LE1/YR: CPT | Performed by: INTERNAL MEDICINE

## 2021-08-30 PROCEDURE — G8752 SYS BP LESS 140: HCPCS | Performed by: INTERNAL MEDICINE

## 2021-08-30 PROCEDURE — G8432 DEP SCR NOT DOC, RNG: HCPCS | Performed by: INTERNAL MEDICINE

## 2021-08-30 PROCEDURE — 2022F DILAT RTA XM EVC RTNOPTHY: CPT | Performed by: INTERNAL MEDICINE

## 2021-08-30 PROCEDURE — 99215 OFFICE O/P EST HI 40 MIN: CPT | Performed by: INTERNAL MEDICINE

## 2021-08-30 PROCEDURE — 1090F PRES/ABSN URINE INCON ASSESS: CPT | Performed by: INTERNAL MEDICINE

## 2021-08-30 PROCEDURE — G8400 PT W/DXA NO RESULTS DOC: HCPCS | Performed by: INTERNAL MEDICINE

## 2021-08-30 PROCEDURE — 3051F HG A1C>EQUAL 7.0%<8.0%: CPT | Performed by: INTERNAL MEDICINE

## 2021-08-30 PROCEDURE — G8427 DOCREV CUR MEDS BY ELIG CLIN: HCPCS | Performed by: INTERNAL MEDICINE

## 2021-08-30 PROCEDURE — G8419 CALC BMI OUT NRM PARAM NOF/U: HCPCS | Performed by: INTERNAL MEDICINE

## 2021-08-30 PROCEDURE — G8536 NO DOC ELDER MAL SCRN: HCPCS | Performed by: INTERNAL MEDICINE

## 2021-08-30 PROCEDURE — G8754 DIAS BP LESS 90: HCPCS | Performed by: INTERNAL MEDICINE

## 2021-08-30 PROCEDURE — 3017F COLORECTAL CA SCREEN DOC REV: CPT | Performed by: INTERNAL MEDICINE

## 2021-08-30 RX ORDER — LEVOTHYROXINE SODIUM 25 UG/1
25 TABLET ORAL
COMMUNITY
End: 2021-10-27 | Stop reason: SDUPTHER

## 2021-08-30 RX ORDER — LANOLIN ALCOHOL/MO/W.PET/CERES
CREAM (GRAM) TOPICAL
COMMUNITY
End: 2022-01-05

## 2021-08-30 RX ORDER — CLONIDINE HYDROCHLORIDE 0.1 MG/1
0.1 TABLET ORAL DAILY
COMMUNITY

## 2021-08-30 RX ORDER — BACLOFEN 10 MG/1
TABLET ORAL
COMMUNITY

## 2021-08-30 NOTE — PROGRESS NOTES
Catalina Thibodeaux is a 78 y.o. female here for   Chief Complaint   Patient presents with    Diabetes    Thyroid Problem       1. Have you been to the ER, urgent care clinic since your last visit? Hospitalized since your last visit? - no    2. Have you seen or consulted any other health care providers outside of the [address] [place] [address] since your last visit?   Include any pap smears or colon screening.- PCP, gastro, neuro  Order placed for pt,  per Verbal Order with read back from Dr Beatris Aguilar 8/30/2021

## 2021-08-30 NOTE — PROGRESS NOTES
Patient called back to let Dr. Freire Every know that Dr. Lottie Peraza is the physician who changed her medication.

## 2021-08-30 NOTE — PROGRESS NOTES
Fredo Vicente MD        Patient Information  Date:8/30/2021  Name : Rosana Rios 78 y.o.     YOB: 1950         Referred by: Zack Baird MD         Chief Complaint   Patient presents with    Diabetes    Thyroid Problem     History of Present Illness: Rosana Rios is a 78 y.o. female here for follow-up of  Type 2 Diabetes Mellitus. Type 2 Diabetes was diagnosed 2017  . HYperthyroidism - on MMI, she is back on levothyroxine, says oncology started her back on levothyroxine. TSH was slightly elevated while she was on methimazole and Dr. Haja Reyez started her back on levothyroxine  Advised patient to call me before she changes any of the thyroid medication in the future. She has been gaining weight now, eating better, she was losing weight when she had hashitoxicosis. She was on methimazole for some time which helped but she self discontinued now.   Cardiovascular risk factors: dyslipidemia, diabetes mellitus   She is off insulin    Her regular weight is 140 pounds, she is now 156 pounds      Current exercise: housecleaning, no regular exercise    Wt Readings from Last 3 Encounters:   08/30/21 156 lb 11.2 oz (71.1 kg)   04/26/21 137 lb (62.1 kg)   12/22/20 117 lb 6.4 oz (53.3 kg)       BP Readings from Last 3 Encounters:   04/26/21 (!) 147/62   12/22/20 (!) 122/46   07/30/20 123/61           Past Medical History:   Diagnosis Date    Acid reflux     Arthritis     Asthma     Back pain     Chronic obstructive pulmonary disease (HCC)     Cirrhosis of liver (HCC)     Diabetes (HCC)     Headache     Hepatitis C     Hiatal hernia     Hypertension     Multiple sclerosis (HCC)     Seizures (HCC)     Sleep apnea      Current Outpatient Medications   Medication Sig    methIMAzole (TAPAZOLE) 5 mg tablet TAKE 1 TABLET BY MOUTH DAILY    methIMAzole (TAPAZOLE) 5 mg tablet TAKE 1 TABLET BY MOUTH DAILY    suvorexant (Belsomra) 10 mg tablet Take 5 mg by mouth nightly.  colestipoL (COLESTID) 1 gram tablet Take 1 g by mouth three (3) times daily.  lipase-protease-amylase (CREON 24,000) 24,000-76,000 -120,000 unit capsule Take 2 Caps by mouth three (3) times daily (with meals).  nadoloL (CORGARD) 20 mg tablet Take 40 mg by mouth daily.  vitamin e (E GEMS) 100 unit capsule Take 100 Units by mouth daily.  albuterol (PROVENTIL HFA, VENTOLIN HFA, PROAIR HFA) 90 mcg/actuation inhaler     hydroCHLOROthiazide (MICROZIDE) 12.5 mg capsule Take 1 Cap by mouth daily.  lisinopriL (PRINIVIL, ZESTRIL) 40 mg tablet Take 1 Tab by mouth daily.  montelukast (SINGULAIR) 10 mg tablet Take 1 Tab by mouth daily.  omeprazole (PRILOSEC) 20 mg capsule Take 1 Cap by mouth daily.  topiramate (TOPAMAX) 50 mg tablet Take 2 Tabs by mouth nightly.  Tecfidera 240 mg cpDR Take 1 Tab by mouth two (2) times a day.  aspirin delayed-release 81 mg tablet Take 81 mg by mouth nightly.  multivitamin (ONE A DAY) tablet Take 1 Tab by mouth daily.  simethicone (GAS RELIEF) 80 mg chewable tablet Take 80 mg by mouth every six (6) hours as needed for Flatulence.  tiotropium (SPIRIVA WITH HANDIHALER) 18 mcg inhalation capsule Take 1 Cap by inhalation daily.  loperamide (IMODIUM) 2 mg capsule TK ONE C PO  BID PRN    fluticasone-salmeterol (ADVAIR DISKUS) 250-50 mcg/dose diskus inhaler Take 1 Puff by inhalation every twelve (12) hours. No current facility-administered medications for this visit. Allergies   Allergen Reactions    Iodinated Contrast Media Anaphylaxis    Gluten Diarrhea    Lactose Diarrhea    Milk Containing Products Unknown (comments)       Review of Systems: Per HPI    Physical Examination:   Height 5' 1\" (1.549 m), weight 156 lb 11.2 oz (71.1 kg). Estimated body mass index is 29.61 kg/m² as calculated from the following:    Height as of this encounter: 5' 1\" (1.549 m).   -   Weight as of this encounter: 156 lb 11.2 oz (71.1 kg). - General: pleasant, no distress, good eye contact  - HEENT: no exophthalmos, no periorbital edema, EOMI  - Neck: No thyromegaly  - CVS: S1-S2 regular  - RS: Normal respiratory effort  - Musculoskeletal: no tremors  - Neurological: alert and oriented  - Psychiatric: normal mood and affect  - Skin: Normal color          Data Reviewed:       Lab Results   Component Value Date/Time    Hemoglobin A1c 7.3 (H) 08/23/2021 09:35 AM    Hemoglobin A1c 5.5 06/16/2020 09:50 AM    Hemoglobin A1c 5.8 (H) 01/23/2020 10:02 AM    Hemoglobin A1c, External 6.3 04/29/2015 10:39 AM    Glucose 188 (H) 08/23/2021 09:35 AM    Microalbumin/Creat ratio (mg/g creat) 10 01/23/2020 10:02 AM    Microalbumin,urine random 0.88 01/23/2020 10:02 AM    LDL,Direct 110 (H) 08/29/2019 08:31 AM    Creatinine (POC) 0.7 01/21/2018 01:25 PM    Creatinine 0.84 08/23/2021 09:35 AM      Lab Results   Component Value Date/Time    GFR est non-AA 72 08/23/2021 09:35 AM    GFRNA, POC >60 01/21/2018 01:25 PM    GFR est AA 83 08/23/2021 09:35 AM    GFRAA, POC >60 01/21/2018 01:25 PM    Creatinine 0.84 08/23/2021 09:35 AM    Creatinine (POC) 0.7 01/21/2018 01:25 PM    BUN 15 08/23/2021 09:35 AM    Sodium 141 08/23/2021 09:35 AM    Potassium 4.0 08/23/2021 09:35 AM    Chloride 105 08/23/2021 09:35 AM    CO2 24 08/23/2021 09:35 AM    PTH, Intact 37 07/20/2020 07:31 AM       Assessment/Plan:     1. Type 2 diabetes mellitus with hyperglycemia, without long-term current use of insulin (HCC)    2. Iatrogenic hyperthyroidism    3. Essential hypertension        1. Type 2 Diabetes Mellitus  Lab Results   Component Value Date/Time    Hemoglobin A1c 7.3 (H) 08/23/2021 09:35 AM    Hemoglobin A1c (POC) 6.1 04/26/2021 02:56 PM    Hemoglobin A1c, External 6.3 04/29/2015 10:39 AM     Janumet stopped Dec 2020 , will have to resume   She is gaining weight, lost weight due to hashitoxicosis    No history of pancreatitis  Gastroenterologist - Kait Orlando.   She was diagnosed with exogenous pancreatic insufficiency, on Creon      2. HTN : Continue current therapy     3. Hyperlipidemia : myalgia, off statin and refused     4. History of hepatitis C    5. Hashitoxicosis - off LT4  NM scan - 45 % diffuse uptake  On MMI - she has self stopped,    She was started on Levothyroxine by Dr Frankie Xiao and she is on it for a month - monitor TFTs. Spent > 40 minutes on the day of the visit reviewing chart, examining, ordering/reviewing labs, counseling, discussing therapeutics and documentation in the medical record    There are no Patient Instructions on file for this visit. Thank you for allowing me to participate in the care of this patient. Mayra Garcia MD      Patient verbalized understanding     Voice-recognition software was used to generate this report, which may result in some phonetic-based errors in the grammar and contents. Even though attempts were made to correct all the mistakes, some may have been missed and remained in the body of the report.

## 2021-08-30 NOTE — LETTER
8/31/2021    Patient: Hannah Akhtar   YOB: 1950   Date of Visit: 8/30/2021     [phone]  Via Fax: [phone]    Dear Roland Crawford MD,      Thank you for referring Ms. Adela Thomas to [address] [place] [address] for evaluation. My notes for this consultation are attached. If you have questions, please do not hesitate to call me. I look forward to following your patient along with you.       Sincerely,    Eden Chow MD

## 2021-09-23 ENCOUNTER — TELEPHONE (OUTPATIENT)
Dept: ENDOCRINOLOGY | Age: 71
End: 2021-09-23

## 2021-09-23 DIAGNOSIS — E11.65 TYPE 2 DIABETES MELLITUS WITH HYPERGLYCEMIA, WITHOUT LONG-TERM CURRENT USE OF INSULIN (HCC): Primary | ICD-10-CM

## 2021-09-24 RX ORDER — IBUPROFEN 200 MG
CAPSULE ORAL
Qty: 200 STRIP | Refills: 3 | Status: SHIPPED | OUTPATIENT
Start: 2021-09-24 | End: 2022-09-12

## 2021-10-04 ENCOUNTER — HOSPITAL ENCOUNTER (EMERGENCY)
Age: 71
Discharge: HOME | End: 2021-10-04
Attending: STUDENT IN AN ORGANIZED HEALTH CARE EDUCATION/TRAINING PROGRAM | Admitting: STUDENT IN AN ORGANIZED HEALTH CARE EDUCATION/TRAINING PROGRAM
Payer: MEDICARE

## 2021-10-04 VITALS
WEIGHT: 141 LBS | OXYGEN SATURATION: 99 % | DIASTOLIC BLOOD PRESSURE: 75 MMHG | HEART RATE: 61 BPM | HEIGHT: 63 IN | SYSTOLIC BLOOD PRESSURE: 197 MMHG | RESPIRATION RATE: 16 BRPM | BODY MASS INDEX: 24.98 KG/M2 | TEMPERATURE: 98.6 F

## 2021-10-04 DIAGNOSIS — N39.0 URINARY TRACT INFECTION WITH HEMATURIA, SITE UNSPECIFIED: Primary | ICD-10-CM

## 2021-10-04 DIAGNOSIS — R31.9 URINARY TRACT INFECTION WITH HEMATURIA, SITE UNSPECIFIED: Primary | ICD-10-CM

## 2021-10-04 LAB
ALBUMIN SERPL-MCNC: 3.8 G/DL (ref 3.5–5)
ALBUMIN/GLOB SERPL: 1 {RATIO} (ref 1.1–2.2)
ALP SERPL-CCNC: 89 U/L (ref 45–117)
ALT SERPL-CCNC: 47 U/L (ref 12–78)
ANION GAP SERPL CALC-SCNC: 5 MMOL/L (ref 5–15)
APPEARANCE UR: ABNORMAL
AST SERPL W P-5'-P-CCNC: 36 U/L (ref 15–37)
BACTERIA URNS QL MICRO: ABNORMAL /HPF
BASOPHILS # BLD: 0.1 K/UL (ref 0–0.1)
BASOPHILS NFR BLD: 1 % (ref 0–1)
BILIRUB SERPL-MCNC: 0.9 MG/DL (ref 0.2–1)
BILIRUB UR QL: NEGATIVE
BUN SERPL-MCNC: 37 MG/DL (ref 6–20)
BUN/CREAT SERPL: 26 (ref 12–20)
CA-I BLD-MCNC: 10.2 MG/DL (ref 8.5–10.1)
CHLORIDE SERPL-SCNC: 109 MMOL/L (ref 97–108)
CO2 SERPL-SCNC: 26 MMOL/L (ref 21–32)
COLOR UR: ABNORMAL
CREAT SERPL-MCNC: 1.45 MG/DL (ref 0.55–1.02)
DIFFERENTIAL METHOD BLD: ABNORMAL
EOSINOPHIL # BLD: 0.2 K/UL (ref 0–0.4)
EOSINOPHIL NFR BLD: 3 % (ref 0–7)
ERYTHROCYTE [DISTWIDTH] IN BLOOD BY AUTOMATED COUNT: 14.7 % (ref 11.5–14.5)
GLOBULIN SER CALC-MCNC: 3.9 G/DL (ref 2–4)
GLUCOSE SERPL-MCNC: 102 MG/DL (ref 65–100)
GLUCOSE UR STRIP.AUTO-MCNC: NEGATIVE MG/DL
HCT VFR BLD AUTO: 39 % (ref 35–47)
HGB BLD-MCNC: 13 G/DL (ref 11.5–16)
HGB UR QL STRIP: NEGATIVE
IMM GRANULOCYTES # BLD AUTO: 0 K/UL (ref 0–0.04)
IMM GRANULOCYTES NFR BLD AUTO: 0 % (ref 0–0.5)
KETONES UR QL STRIP.AUTO: 5 MG/DL
LEUKOCYTE ESTERASE UR QL STRIP.AUTO: ABNORMAL
LYMPHOCYTES # BLD: 2.3 K/UL (ref 0.8–3.5)
LYMPHOCYTES NFR BLD: 30 % (ref 12–49)
MCH RBC QN AUTO: 29.2 PG (ref 26–34)
MCHC RBC AUTO-ENTMCNC: 33.3 G/DL (ref 30–36.5)
MCV RBC AUTO: 87.6 FL (ref 80–99)
MONOCYTES # BLD: 0.7 K/UL (ref 0–1)
MONOCYTES NFR BLD: 9 % (ref 5–13)
MUCOUS THREADS URNS QL MICRO: ABNORMAL /LPF
NEUTS SEG # BLD: 4.2 K/UL (ref 1.8–8)
NEUTS SEG NFR BLD: 57 % (ref 32–75)
NITRITE UR QL STRIP.AUTO: POSITIVE
NRBC # BLD: 0 K/UL (ref 0–0.01)
NRBC BLD-RTO: 0 PER 100 WBC
OTHER URINE MICRO,5051: PRESENT
PH UR STRIP: 5 [PH] (ref 5–8)
PLATELET # BLD AUTO: 191 K/UL (ref 150–400)
PMV BLD AUTO: 11.5 FL (ref 8.9–12.9)
POTASSIUM SERPL-SCNC: 4.6 MMOL/L (ref 3.5–5.1)
PROT SERPL-MCNC: 7.7 G/DL (ref 6.4–8.2)
PROT UR STRIP-MCNC: 30 MG/DL
RBC # BLD AUTO: 4.45 M/UL (ref 3.8–5.2)
RBC #/AREA URNS HPF: ABNORMAL /HPF (ref 0–5)
SODIUM SERPL-SCNC: 140 MMOL/L (ref 136–145)
SP GR UR REFRACTOMETRY: 1.02 (ref 1–1.03)
UA: UC IF INDICATED,UAUC: ABNORMAL
UROBILINOGEN UR QL STRIP.AUTO: 0.1 EU/DL (ref 0.1–1)
WBC # BLD AUTO: 7.4 K/UL (ref 3.6–11)
WBC URNS QL MICRO: >100 /HPF (ref 0–4)

## 2021-10-04 PROCEDURE — 74011000250 HC RX REV CODE- 250: Performed by: STUDENT IN AN ORGANIZED HEALTH CARE EDUCATION/TRAINING PROGRAM

## 2021-10-04 PROCEDURE — 87077 CULTURE AEROBIC IDENTIFY: CPT

## 2021-10-04 PROCEDURE — 81001 URINALYSIS AUTO W/SCOPE: CPT

## 2021-10-04 PROCEDURE — 85025 COMPLETE CBC W/AUTO DIFF WBC: CPT

## 2021-10-04 PROCEDURE — 99283 EMERGENCY DEPT VISIT LOW MDM: CPT

## 2021-10-04 PROCEDURE — 87186 SC STD MICRODIL/AGAR DIL: CPT

## 2021-10-04 PROCEDURE — 80053 COMPREHEN METABOLIC PANEL: CPT

## 2021-10-04 PROCEDURE — 96374 THER/PROPH/DIAG INJ IV PUSH: CPT

## 2021-10-04 PROCEDURE — 74011250636 HC RX REV CODE- 250/636: Performed by: STUDENT IN AN ORGANIZED HEALTH CARE EDUCATION/TRAINING PROGRAM

## 2021-10-04 PROCEDURE — 87086 URINE CULTURE/COLONY COUNT: CPT

## 2021-10-04 RX ORDER — CEPHALEXIN 250 MG/1
250 CAPSULE ORAL 3 TIMES DAILY
Qty: 21 CAPSULE | Refills: 0 | Status: SHIPPED | OUTPATIENT
Start: 2021-10-04 | End: 2021-11-01 | Stop reason: ALTCHOICE

## 2021-10-04 RX ADMIN — SODIUM CHLORIDE 1000 ML: 9 INJECTION, SOLUTION INTRAVENOUS at 21:00

## 2021-10-04 RX ADMIN — CEFTRIAXONE 1 G: 1 INJECTION, POWDER, FOR SOLUTION INTRAMUSCULAR; INTRAVENOUS at 21:00

## 2021-10-04 NOTE — ED PROVIDER NOTES
EMERGENCY DEPARTMENT HISTORY AND PHYSICAL EXAM      Date: 10/4/2021  Patient Name: Jolly Sánchez    History of Presenting Illness     Chief Complaint   Patient presents with    Vomiting    Fatigue       History Provided By: Patient    HPI: Jolly Sánchez, 73 y.o. female with a past medical history significant COPD and Cirrhosis, headache, hypertension presents to the ED with cc of intermittent abdominal pain occasional nausea, some mild confusion, fogginess today. Patient denies any fevers chills denies any chest pain shortness of breath. Describes pain as lower abdomen, patient does have some mild dysuria as well. Patient states that she knows she has a history of chronic UTIs, which she states she might have UTI on top of something else. Patient denies any flank pain denies any constipation, patient states she does suffer from chronic diarrhea. There are no other complaints, changes, or physical findings at this time. PCP: Karyn Hugo MD    Current Facility-Administered Medications   Medication Dose Route Frequency Provider Last Rate Last Admin    sodium chloride 0.9 % bolus infusion 1,000 mL  1,000 mL IntraVENous ONCE Gauri Lazcano MD 1,000 mL/hr at 10/04/21 2100 1,000 mL at 10/04/21 2100     Current Outpatient Medications   Medication Sig Dispense Refill    cephALEXin (Keflex) 250 mg capsule Take 1 Capsule by mouth three (3) times daily. 21 Capsule 0    glucose blood VI test strips (blood glucose test) strip Use to check BG twice daily. E11.65 200 Strip 3    cloNIDine HCL (CATAPRES) 0.1 mg tablet Take 0.1 mg by mouth daily. If BP over 130      ferrous sulfate 325 mg (65 mg iron) tablet Take  by mouth Daily (before breakfast).  baclofen (LIORESAL) 10 mg tablet Take  by mouth. 1/2 tab PRN      levothyroxine (SYNTHROID) 25 mcg tablet Take 25 mcg by mouth Daily (before breakfast).       methIMAzole (TAPAZOLE) 5 mg tablet TAKE 1 TABLET BY MOUTH DAILY (Patient not taking: Reported on 8/30/2021) 30 Tablet 3    methIMAzole (TAPAZOLE) 5 mg tablet TAKE 1 TABLET BY MOUTH DAILY (Patient not taking: Reported on 8/30/2021) 30 Tablet 3    suvorexant (Belsomra) 10 mg tablet Take 5 mg by mouth nightly.  colestipoL (COLESTID) 1 gram tablet Take 1 g by mouth three (3) times daily.  lipase-protease-amylase (CREON 24,000) 24,000-76,000 -120,000 unit capsule Take 2 Caps by mouth three (3) times daily (with meals).  nadoloL (CORGARD) 20 mg tablet Take 40 mg by mouth daily.  vitamin e (E GEMS) 100 unit capsule Take 100 Units by mouth daily.  albuterol (PROVENTIL HFA, VENTOLIN HFA, PROAIR HFA) 90 mcg/actuation inhaler       hydroCHLOROthiazide (MICROZIDE) 12.5 mg capsule Take 1 Cap by mouth daily.  lisinopriL (PRINIVIL, ZESTRIL) 40 mg tablet Take 1 Tab by mouth daily.  montelukast (SINGULAIR) 10 mg tablet Take 1 Tab by mouth daily.  omeprazole (PRILOSEC) 20 mg capsule Take 1 Cap by mouth daily.  topiramate (TOPAMAX) 50 mg tablet Take 2 Tabs by mouth nightly. (Patient not taking: Reported on 8/30/2021)      Tecfidera 240 mg cpDR Take 1 Tab by mouth two (2) times a day.  aspirin delayed-release 81 mg tablet Take 81 mg by mouth nightly.  multivitamin (ONE A DAY) tablet Take 1 Tab by mouth daily.  simethicone (GAS RELIEF) 80 mg chewable tablet Take 80 mg by mouth every six (6) hours as needed for Flatulence.  tiotropium (SPIRIVA WITH HANDIHALER) 18 mcg inhalation capsule Take 1 Cap by inhalation daily. (Patient not taking: Reported on 8/30/2021)      loperamide (IMODIUM) 2 mg capsule TK ONE C PO  BID PRN  0    fluticasone-salmeterol (ADVAIR DISKUS) 250-50 mcg/dose diskus inhaler Take 1 Puff by inhalation every twelve (12) hours.          Past History     Past Medical History:  Past Medical History:   Diagnosis Date    Acid reflux     Arthritis     Asthma     Back pain     Chronic obstructive pulmonary disease (HCC)     [facility]     Diabetes ([facility]     Headache     Hepatitis C     Hiatal hernia     Hypertension     [facility]     Seizures (HCC)     Sleep apnea        Past Surgical History:  Past Surgical History:   Procedure Laterality Date    HX CHOLECYSTECTOMY      HX HYSTERECTOMY      HX HYSTERECTOMY      HX LIPECTOMY  05/15/2020    HX ORTHOPAEDIC      wrist surgery     HX TONSILLECTOMY         Family History:  Family History   Problem Relation Age of Onset    Diabetes Father     Heart Attack Father     Diabetes Brother     Stroke Mother        Social History:  Social History     Tobacco Use    Smoking status: Never Smoker    Smokeless tobacco: Never Used   Vaping Use    Vaping Use: Never used   Substance Use Topics    Alcohol use: Yes     Comment: on occasion     Drug use: Not Currently       Allergies: Allergies   Allergen Reactions    Iodinated Contrast Media Anaphylaxis    Gluten Diarrhea    Lactose Diarrhea    Milk Containing Products Unknown (comments)         Review of Systems     Review of Systems   Constitutional: Negative for activity change, appetite change, chills, fatigue and fever. Respiratory: Negative for chest tightness and shortness of breath. Cardiovascular: Positive for chest pain. Negative for palpitations. Gastrointestinal: Positive for nausea. Negative for abdominal pain and vomiting. Genitourinary: Positive for dysuria. Negative for hematuria and vaginal bleeding. Musculoskeletal: Negative for arthralgias and back pain. Skin: Negative for color change. Neurological: Negative for dizziness, numbness and headaches. Psychiatric/Behavioral: Positive for confusion. Physical Exam     Physical Exam  Vitals and nursing note reviewed. Constitutional:       General: She is not in acute distress. Appearance: Normal appearance. She is normal weight. She is not ill-appearing. HENT:      Head: Normocephalic and atraumatic.       Nose: Nose normal. Mouth/Throat:      Mouth: Mucous membranes are moist.   Eyes:      Extraocular Movements: Extraocular movements intact. Pupils: Pupils are equal, round, and reactive to light. Cardiovascular:      Rate and Rhythm: Normal rate and regular rhythm. Pulses: Normal pulses. Pulmonary:      Effort: Pulmonary effort is normal.   Abdominal:      General: Abdomen is flat. Bowel sounds are normal. There is no distension. Palpations: Abdomen is soft. Tenderness: There is no abdominal tenderness. There is no guarding. Musculoskeletal:         General: No tenderness. Normal range of motion. Cervical back: Normal range of motion and neck supple. No muscular tenderness. Skin:     General: Skin is warm and dry. Neurological:      General: No focal deficit present. Mental Status: She is alert and oriented to person, place, and time. Mental status is at baseline. Cranial Nerves: No cranial nerve deficit. Sensory: No sensory deficit. Motor: No weakness.          Diagnostic Study Results     Labs -     Recent Results (from the past 12 hour(s))   URINALYSIS W/ REFLEX CULTURE    Collection Time: 10/04/21  4:30 PM    Specimen: Urine   Result Value Ref Range    Color Yellow/Straw      Appearance Turbid (A) Clear      Specific gravity 1.016 1.003 - 1.030      pH (UA) 5.0 5.0 - 8.0      Protein 30 (A) Negative mg/dL    Glucose Negative Negative mg/dL    Ketone 5 (A) Negative mg/dL    Bilirubin Negative Negative      Blood Negative Negative      Urobilinogen 0.1 0.1 - 1.0 EU/dL    Nitrites Positive (A) Negative      Leukocyte Esterase Large (A) Negative      UA:UC IF INDICATED Urine Culture Ordered (A) Culture not indicated by UA result      WBC >100 (H) 0 - 4 /hpf    RBC 10-20 0 - 5 /hpf    Bacteria 1+ (A) Negative /hpf    Mucus Trace /lpf    Other Urine Micro Present     CBC WITH AUTOMATED DIFF    Collection Time: 10/04/21  4:45 PM   Result Value Ref Range    WBC 7.4 3.6 - 11.0 K/uL RBC 4.45 3.80 - 5.20 M/uL    HGB 13.0 11.5 - 16.0 g/dL    HCT 39.0 35.0 - 47.0 %    MCV 87.6 80.0 - 99.0 FL    MCH 29.2 26.0 - 34.0 PG    MCHC 33.3 30.0 - 36.5 g/dL    RDW 14.7 (H) 11.5 - 14.5 %    PLATELET 417 439 - 680 K/uL    MPV 11.5 8.9 - 12.9 FL    NRBC 0.0 0.0  WBC    ABSOLUTE NRBC 0.00 0.00 - 0.01 K/uL    NEUTROPHILS 57 32 - 75 %    LYMPHOCYTES 30 12 - 49 %    MONOCYTES 9 5 - 13 %    EOSINOPHILS 3 0 - 7 %    BASOPHILS 1 0 - 1 %    IMMATURE GRANULOCYTES 0 0 - 0.5 %    ABS. NEUTROPHILS 4.2 1.8 - 8.0 K/UL    ABS. LYMPHOCYTES 2.3 0.8 - 3.5 K/UL    ABS. MONOCYTES 0.7 0.0 - 1.0 K/UL    ABS. EOSINOPHILS 0.2 0.0 - 0.4 K/UL    ABS. BASOPHILS 0.1 0.0 - 0.1 K/UL    ABS. IMM. GRANS. 0.0 0.00 - 0.04 K/UL    DF AUTOMATED     METABOLIC PANEL, COMPREHENSIVE    Collection Time: 10/04/21  4:45 PM   Result Value Ref Range    Sodium 140 136 - 145 mmol/L    Potassium 4.6 3.5 - 5.1 mmol/L    Chloride 109 (H) 97 - 108 mmol/L    CO2 26 21 - 32 mmol/L    Anion gap 5 5 - 15 mmol/L    Glucose 102 (H) 65 - 100 mg/dL    BUN 37 (H) 6 - 20 mg/dL    Creatinine 1.45 (H) 0.55 - 1.02 mg/dL    BUN/Creatinine ratio 26 (H) 12 - 20      GFR est AA 43 (L) >60 ml/min/1.73m2    GFR est non-AA 36 (L) >60 ml/min/1.73m2    Calcium 10.2 (H) 8.5 - 10.1 mg/dL    Bilirubin, total 0.9 0.2 - 1.0 mg/dL    AST (SGOT) 36 15 - 37 U/L    ALT (SGPT) 47 12 - 78 U/L    Alk. phosphatase 89 45 - 117 U/L    Protein, total 7.7 6.4 - 8.2 g/dL    Albumin 3.8 3.5 - 5.0 g/dL    Globulin 3.9 2.0 - 4.0 g/dL    A-G Ratio 1.0 (L) 1.1 - 2.2         Radiologic Studies -   [unfilled]  CT Results  (Last 48 hours)    None        CXR Results  (Last 48 hours)    None          Medical Decision Making and ED Course   I am the first provider for this patient. I reviewed the vital signs, available nursing notes, past medical history, past surgical history, family history and social history. Vital Signs-Reviewed the patient's vital signs.   Patient Vitals for the past 12 hrs:   Temp Pulse Resp BP SpO2   10/04/21 1631 98.4 °F (36.9 °C) 65 16 (!) 147/79 98 %           Records Reviewed: Nursing Notes and Old Medical Records    The patient presents with abdominal discomfort, nausea, confusion with a differential diagnosis of sepsis, UTI, dehydration, electrolyte abnormality, hepatic encephalopathy      Provider Notes (Medical Decision Making):     MDM   72-year-old female history of COPD, hypertension, cirrhosis, presents to emergency department for weakness, diarrhea, nausea. Physical exam shows well-appearing female, no distress, abdomen soft nontender nondistended no flank pain or tenderness to percussion. Basic lab work drawn, UA sent. ED Course:   Initial assessment performed. The patients presenting problems have been discussed, and they are in agreement with the care plan formulated and outlined with them. I have encouraged them to ask questions as they arise throughout their visit. ED Course as of Oct 04 2145   [facility] Oct 04, 2021   3330 Lab work resulting, metabolic panel shows prerenal acidemia BUN 37 creatinine 1.45, CBC shows no leukocytosis, stable H&H Alysis shows large leuks positive nitrites 1+ bacteria, significant for UTI. Will give patient 1 L normal saline, 1 dose Rocephin IV. Given benign abdominal exam findings do not feel patient requires imaging at this time. [PZ]   2113 Discussed plan with patient, received Rocephin currently receiving IV fluids, patient minimal to plan, is awake alert and oriented do not suspect severe sepsis. [PZ]      ED Course User Index  [PZ] Gauri Lazcano MD         Procedures       Oneal Luong MD  Procedures             Disposition       Discharged    Remove if not discharged  DISCHARGE PLAN:  1. Current Discharge Medication List      CONTINUE these medications which have NOT CHANGED    Details   glucose blood VI test strips (blood glucose test) strip Use to check BG twice daily.  E11.65  Qty: 200 Strip, Refills: 3    Comments: Pt stated she needs accu chek strips, but did not specify which type of accu chek. Please dispense whichever type/ brand pt needs. Thanks. Associated Diagnoses: Type 2 diabetes mellitus with hyperglycemia, without long-term current use of insulin (HCC)      cloNIDine HCL (CATAPRES) 0.1 mg tablet Take 0.1 mg by mouth daily. If BP over 130      ferrous sulfate 325 mg (65 mg iron) tablet Take  by mouth Daily (before breakfast). baclofen (LIORESAL) 10 mg tablet Take  by mouth. 1/2 tab PRN      levothyroxine (SYNTHROID) 25 mcg tablet Take 25 mcg by mouth Daily (before breakfast). !! methIMAzole (TAPAZOLE) 5 mg tablet TAKE 1 TABLET BY MOUTH DAILY  Qty: 30 Tablet, Refills: 3    Associated Diagnoses: Hyperthyroidism      !! methIMAzole (TAPAZOLE) 5 mg tablet TAKE 1 TABLET BY MOUTH DAILY  Qty: 30 Tablet, Refills: 3    Associated Diagnoses: Hyperthyroidism      suvorexant (Belsomra) 10 mg tablet Take 5 mg by mouth nightly. colestipoL (COLESTID) 1 gram tablet Take 1 g by mouth three (3) times daily. lipase-protease-amylase (CREON 24,000) 24,000-76,000 -120,000 unit capsule Take 2 Caps by mouth three (3) times daily (with meals). nadoloL (CORGARD) 20 mg tablet Take 40 mg by mouth daily. vitamin e (E GEMS) 100 unit capsule Take 100 Units by mouth daily. albuterol (PROVENTIL HFA, VENTOLIN HFA, PROAIR HFA) 90 mcg/actuation inhaler       hydroCHLOROthiazide (MICROZIDE) 12.5 mg capsule Take 1 Cap by mouth daily. lisinopriL (PRINIVIL, ZESTRIL) 40 mg tablet Take 1 Tab by mouth daily. montelukast (SINGULAIR) 10 mg tablet Take 1 Tab by mouth daily. omeprazole (PRILOSEC) 20 mg capsule Take 1 Cap by mouth daily. topiramate (TOPAMAX) 50 mg tablet Take 2 Tabs by mouth nightly. Tecfidera 240 mg cpDR Take 1 Tab by mouth two (2) times a day. aspirin delayed-release 81 mg tablet Take 81 mg by mouth nightly.       multivitamin (ONE A DAY) tablet Take 1 Tab by mouth daily. simethicone (GAS RELIEF) 80 mg chewable tablet Take 80 mg by mouth every six (6) hours as needed for Flatulence. tiotropium (SPIRIVA WITH HANDIHALER) 18 mcg inhalation capsule Take 1 Cap by inhalation daily. loperamide (IMODIUM) 2 mg capsule TK ONE C PO  BID PRN  Refills: 0      fluticasone-salmeterol (ADVAIR DISKUS) 250-50 mcg/dose diskus inhaler Take 1 Puff by inhalation every twelve (12) hours. !! - Potential duplicate medications found. Please discuss with provider. 2.   Follow-up Information     Follow up With Specialties Details Why Contact Info    Christina Mooney MD Internal Medicine In 3 days  [address] DEPT Emergency Medicine  If symptoms worsen [address]  [phone]        3. Return to ED if worse     Diagnosis     Clinical Impression:   1. Urinary tract infection with hematuria, site unspecified        Attestations:    Camryn De Leon MD    Please note that this dictation was completed with [employer], the [employer] voice recognition software. Quite often unanticipated grammatical, syntax, homophones, and other interpretive errors are inadvertently transcribed by the computer software. Please disregard these errors. Please excuse any errors that have escaped final proofreading. Thank you.

## 2021-10-04 NOTE — ED TRIAGE NOTES
GCS 15 pt stated that she vomited today along with weakness, nausea, fatigue and has been having confusion

## 2021-10-07 LAB
BACTERIA SPEC CULT: ABNORMAL
COLONY COUNT,CNT: ABNORMAL
SPECIAL REQUESTS,SREQ: ABNORMAL

## 2021-10-27 DIAGNOSIS — E03.9 ACQUIRED HYPOTHYROIDISM: Primary | ICD-10-CM

## 2021-10-27 RX ORDER — LEVOTHYROXINE SODIUM 25 UG/1
25 TABLET ORAL
Qty: 90 TABLET | Refills: 3 | Status: SHIPPED | OUTPATIENT
Start: 2021-10-27 | End: 2022-01-05 | Stop reason: SDUPTHER

## 2021-10-27 NOTE — TELEPHONE ENCOUNTER
----- Message from Farhan Byrne Alcon1 sent at 10/27/2021  9:09 AM EDT -----  Regarding: dr Miri Wood refill  Medication Refill    Caller (if not patient): pt      Relationship of caller (if not patient):      Best contact number(s): [phone]      Name of medication and dosage if known: levothyroxine 0.025mg      Is patient out of this medication (yes/no): yes      Pharmacy name: [facility]    Pharmacy listed in chart? (yes/no): yes  Pharmacy phone number: [phone]      Details to clarify the request:      Farhan Rondon

## 2021-10-28 LAB
T4 FREE SERPL-MCNC: 1.64 NG/DL (ref 0.82–1.77)
TSH SERPL DL<=0.005 MIU/L-ACNC: 0.97 UIU/ML (ref 0.45–4.5)

## 2021-11-01 ENCOUNTER — APPOINTMENT (OUTPATIENT)
Dept: GENERAL RADIOLOGY | Age: 71
End: 2021-11-01
Attending: EMERGENCY MEDICINE
Payer: MEDICARE

## 2021-11-01 ENCOUNTER — HOSPITAL ENCOUNTER (EMERGENCY)
Age: 71
Discharge: HOME | End: 2021-11-01
Attending: EMERGENCY MEDICINE
Payer: MEDICARE

## 2021-11-01 ENCOUNTER — APPOINTMENT (OUTPATIENT)
Dept: CT IMAGING | Age: 71
End: 2021-11-01
Attending: EMERGENCY MEDICINE
Payer: MEDICARE

## 2021-11-01 VITALS
HEART RATE: 67 BPM | BODY MASS INDEX: 25.76 KG/M2 | RESPIRATION RATE: 15 BRPM | SYSTOLIC BLOOD PRESSURE: 187 MMHG | OXYGEN SATURATION: 97 % | DIASTOLIC BLOOD PRESSURE: 66 MMHG | HEIGHT: 62 IN | TEMPERATURE: 98.4 F | WEIGHT: 140 LBS
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
## 2021-11-01 DIAGNOSIS — N30.00 ACUTE CYSTITIS WITHOUT HEMATURIA: Primary | ICD-10-CM

## 2021-11-01 LAB
ALBUMIN SERPL-MCNC: 3.5 G/DL (ref 3.5–5)
ALBUMIN SERPL-MCNC: 3.5 G/DL (ref 3.5–5)
ALBUMIN/GLOB SERPL: 0.9 {RATIO} (ref 1.1–2.2)
ALBUMIN/GLOB SERPL: 1 {RATIO} (ref 1.1–2.2)
ALP SERPL-CCNC: 66 U/L (ref 45–117)
ALP SERPL-CCNC: 66 U/L (ref 45–117)
ALT SERPL-CCNC: 27 U/L (ref 12–78)
ALT SERPL-CCNC: 27 U/L (ref 12–78)
AMMONIA PLAS-SCNC: 32 UMOL/L
ANION GAP SERPL CALC-SCNC: 5 MMOL/L (ref 5–15)
APPEARANCE UR: ABNORMAL
AST SERPL W P-5'-P-CCNC: 22 U/L (ref 15–37)
AST SERPL W P-5'-P-CCNC: 27 U/L (ref 15–37)
ATRIAL RATE: 61 BPM
BACTERIA URNS QL MICRO: NEGATIVE /HPF
BASOPHILS # BLD: 0 K/UL (ref 0–0.1)
BASOPHILS NFR BLD: 1 % (ref 0–1)
BILIRUB DIRECT SERPL-MCNC: 0.3 MG/DL (ref 0–0.2)
BILIRUB SERPL-MCNC: 0.9 MG/DL (ref 0.2–1)
BILIRUB SERPL-MCNC: 0.9 MG/DL (ref 0.2–1)
BILIRUB UR QL: NEGATIVE
BUN SERPL-MCNC: 41 MG/DL (ref 6–20)
BUN/CREAT SERPL: 40 (ref 12–20)
CA-I BLD-MCNC: 10.3 MG/DL (ref 8.5–10.1)
CALCULATED P AXIS, ECG09: 34 DEGREES
CALCULATED R AXIS, ECG10: 12 DEGREES
CALCULATED T AXIS, ECG11: 49 DEGREES
CHLORIDE SERPL-SCNC: 114 MMOL/L (ref 97–108)
CO2 SERPL-SCNC: 25 MMOL/L (ref 21–32)
COLOR UR: ABNORMAL
CREAT SERPL-MCNC: 1.02 MG/DL (ref 0.55–1.02)
DIAGNOSIS, 93000: NORMAL
DIFFERENTIAL METHOD BLD: NORMAL
EOSINOPHIL # BLD: 0.2 K/UL (ref 0–0.4)
EOSINOPHIL NFR BLD: 3 % (ref 0–7)
ERYTHROCYTE [DISTWIDTH] IN BLOOD BY AUTOMATED COUNT: 14.5 % (ref 11.5–14.5)
ETHANOL SERPL-MCNC: <4 MG/DL
GLOBULIN SER CALC-MCNC: 3.6 G/DL (ref 2–4)
GLOBULIN SER CALC-MCNC: 3.8 G/DL (ref 2–4)
GLUCOSE SERPL-MCNC: 147 MG/DL (ref 65–100)
GLUCOSE UR STRIP.AUTO-MCNC: NEGATIVE MG/DL
HCT VFR BLD AUTO: 36.9 % (ref 35–47)
HGB BLD-MCNC: 12.3 G/DL (ref 11.5–16)
HGB UR QL STRIP: NEGATIVE
IMM GRANULOCYTES # BLD AUTO: 0 K/UL (ref 0–0.04)
IMM GRANULOCYTES NFR BLD AUTO: 0 % (ref 0–0.5)
KETONES UR QL STRIP.AUTO: 20 MG/DL
LEUKOCYTE ESTERASE UR QL STRIP.AUTO: ABNORMAL
LIPASE SERPL-CCNC: 223 U/L (ref 73–393)
LYMPHOCYTES # BLD: 1.8 K/UL (ref 0.8–3.5)
LYMPHOCYTES NFR BLD: 27 % (ref 12–49)
MCH RBC QN AUTO: 29.7 PG (ref 26–34)
MCHC RBC AUTO-ENTMCNC: 33.3 G/DL (ref 30–36.5)
MCV RBC AUTO: 89.1 FL (ref 80–99)
MONOCYTES # BLD: 0.6 K/UL (ref 0–1)
MONOCYTES NFR BLD: 9 % (ref 5–13)
MUCOUS THREADS URNS QL MICRO: ABNORMAL /LPF
NEUTS SEG # BLD: 4 K/UL (ref 1.8–8)
NEUTS SEG NFR BLD: 60 % (ref 32–75)
NITRITE UR QL STRIP.AUTO: POSITIVE
NRBC # BLD: 0 K/UL (ref 0–0.01)
NRBC BLD-RTO: 0 PER 100 WBC
P-R INTERVAL, ECG05: 162 MS
PH UR STRIP: 5 [PH] (ref 5–8)
PLATELET # BLD AUTO: 180 K/UL (ref 150–400)
PMV BLD AUTO: 11.7 FL (ref 8.9–12.9)
POTASSIUM SERPL-SCNC: 4.2 MMOL/L (ref 3.5–5.1)
PROT SERPL-MCNC: 7.1 G/DL (ref 6.4–8.2)
PROT SERPL-MCNC: 7.3 G/DL (ref 6.4–8.2)
PROT UR STRIP-MCNC: NEGATIVE MG/DL
Q-T INTERVAL, ECG07: 394 MS
QRS DURATION, ECG06: 86 MS
QTC CALCULATION (BEZET), ECG08: 396 MS
RBC # BLD AUTO: 4.14 M/UL (ref 3.8–5.2)
RBC #/AREA URNS HPF: ABNORMAL /HPF (ref 0–5)
SODIUM SERPL-SCNC: 144 MMOL/L (ref 136–145)
SP GR UR REFRACTOMETRY: 1.02 (ref 1–1.03)
TROPONIN-HIGH SENSITIVITY: 12 NG/L (ref 0–51)
TSH SERPL DL<=0.05 MIU/L-ACNC: 0.3 UIU/ML (ref 0.36–3.74)
UA: UC IF INDICATED,UAUC: ABNORMAL
UROBILINOGEN UR QL STRIP.AUTO: 0.1 EU/DL (ref 0.1–1)
VENTRICULAR RATE, ECG03: 61 BPM
WBC # BLD AUTO: 6.7 K/UL (ref 3.6–11)
WBC URNS QL MICRO: ABNORMAL /HPF (ref 0–4)

## 2021-11-01 PROCEDURE — 85025 COMPLETE CBC W/AUTO DIFF WBC: CPT

## 2021-11-01 PROCEDURE — 93005 ELECTROCARDIOGRAM TRACING: CPT

## 2021-11-01 PROCEDURE — 96361 HYDRATE IV INFUSION ADD-ON: CPT

## 2021-11-01 PROCEDURE — 83690 ASSAY OF LIPASE: CPT

## 2021-11-01 PROCEDURE — 71045 X-RAY EXAM CHEST 1 VIEW: CPT

## 2021-11-01 PROCEDURE — 96374 THER/PROPH/DIAG INJ IV PUSH: CPT

## 2021-11-01 PROCEDURE — 75810000275 HC EMERGENCY DEPT VISIT NO LEVEL OF CARE

## 2021-11-01 PROCEDURE — 84443 ASSAY THYROID STIM HORMONE: CPT

## 2021-11-01 PROCEDURE — 80076 HEPATIC FUNCTION PANEL: CPT

## 2021-11-01 PROCEDURE — 82077 ASSAY SPEC XCP UR&BREATH IA: CPT

## 2021-11-01 PROCEDURE — 82140 ASSAY OF AMMONIA: CPT

## 2021-11-01 PROCEDURE — 87077 CULTURE AEROBIC IDENTIFY: CPT

## 2021-11-01 PROCEDURE — 81001 URINALYSIS AUTO W/SCOPE: CPT

## 2021-11-01 PROCEDURE — 84484 ASSAY OF TROPONIN QUANT: CPT

## 2021-11-01 PROCEDURE — 80053 COMPREHEN METABOLIC PANEL: CPT

## 2021-11-01 PROCEDURE — 87186 SC STD MICRODIL/AGAR DIL: CPT

## 2021-11-01 PROCEDURE — 87086 URINE CULTURE/COLONY COUNT: CPT

## 2021-11-01 PROCEDURE — 74011000250 HC RX REV CODE- 250: Performed by: EMERGENCY MEDICINE

## 2021-11-01 PROCEDURE — 70450 CT HEAD/BRAIN W/O DYE: CPT

## 2021-11-01 PROCEDURE — 74011250636 HC RX REV CODE- 250/636: Performed by: EMERGENCY MEDICINE

## 2021-11-01 PROCEDURE — 99285 EMERGENCY DEPT VISIT HI MDM: CPT

## 2021-11-01 PROCEDURE — 36415 COLL VENOUS BLD VENIPUNCTURE: CPT

## 2021-11-01 RX ORDER — CEFDINIR 300 MG/1
300 CAPSULE ORAL 2 TIMES DAILY
Qty: 20 CAPSULE | Refills: 0 | Status: SHIPPED | OUTPATIENT
Start: 2021-11-01 | End: 2021-11-11

## 2021-11-01 RX ADMIN — CEFTRIAXONE 1 G: 1 INJECTION, POWDER, FOR SOLUTION INTRAMUSCULAR; INTRAVENOUS at 18:24

## 2021-11-01 RX ADMIN — SODIUM CHLORIDE 1000 ML: 9 INJECTION, SOLUTION INTRAVENOUS at 15:47

## 2021-11-01 NOTE — Clinical Note
[facility] [facility] EMERGENCY DEPT  [address] [place] [address] [place] [id number]  [phone]    Work/School Note    Date: 11/1/2021    To Whom It May concern:      Luli Weeks was seen and treated today in the emergency room by the following provider(s):  Attending Provider: James Christiansen MD.      Luli Weeks is excused from work/school on 11/01/21. She is clear to return to work/school on 11/02/21.         Sincerely,          Brian Cavazos MD

## 2021-11-01 NOTE — DISCHARGE INSTRUCTIONS
You were seen in the emergency room for your weakness and lightheadedness. This is likely due to a urinary tract infection. It is important you take an antibiotic for this and follow up with your primary care doctor as soon as possible. Continue to take in fluids as well. Make sure you see your PCP sooner rather than later to make sure the antibiotic is effective in treating the infection - if it is not, you may need a different antibiotic. Return to the emergency room for any fevers, vomiting, fainting, pain, difficulty breathing, or any other new or concerning symptoms. Use tylenol or ibuprofen as needed for generalized weakness/pain. Thank you! Thank you for allowing me to care for you in the emergency department. I sincerely hope that you are satisfied with your visit today. It is my goal to provide you with excellent care. Below you will find a list of your labs and imaging from your visit today. Should you have any questions regarding these results please do not hesitate to call the emergency department. Labs -     Recent Results (from the past 12 hour(s))   CBC WITH AUTOMATED DIFF    Collection Time: 11/01/21 12:51 PM   Result Value Ref Range    WBC 6.7 3.6 - 11.0 K/uL    RBC 4.14 3.80 - 5.20 M/uL    HGB 12.3 11.5 - 16.0 g/dL    HCT 36.9 35.0 - 47.0 %    MCV 89.1 80.0 - 99.0 FL    MCH 29.7 26.0 - 34.0 PG    MCHC 33.3 30.0 - 36.5 g/dL    RDW 14.5 11.5 - 14.5 %    PLATELET 250 840 - 642 K/uL    MPV 11.7 8.9 - 12.9 FL    NRBC 0.0 0.0  WBC    ABSOLUTE NRBC 0.00 0.00 - 0.01 K/uL    NEUTROPHILS 60 32 - 75 %    LYMPHOCYTES 27 12 - 49 %    MONOCYTES 9 5 - 13 %    EOSINOPHILS 3 0 - 7 %    BASOPHILS 1 0 - 1 %    IMMATURE GRANULOCYTES 0 0 - 0.5 %    ABS. NEUTROPHILS 4.0 1.8 - 8.0 K/UL    ABS. LYMPHOCYTES 1.8 0.8 - 3.5 K/UL    ABS. MONOCYTES 0.6 0.0 - 1.0 K/UL    ABS. EOSINOPHILS 0.2 0.0 - 0.4 K/UL    ABS. BASOPHILS 0.0 0.0 - 0.1 K/UL    ABS. IMM.  GRANS. 0.0 0.00 - 0.04 K/UL    DF AUTOMATED METABOLIC PANEL, COMPREHENSIVE    Collection Time: 11/01/21 12:51 PM   Result Value Ref Range    Sodium 144 136 - 145 mmol/L    Potassium 4.2 3.5 - 5.1 mmol/L    Chloride 114 (H) 97 - 108 mmol/L    CO2 25 21 - 32 mmol/L    Anion gap 5 5 - 15 mmol/L    Glucose 147 (H) 65 - 100 mg/dL    BUN 41 (H) 6 - 20 mg/dL    Creatinine 1.02 0.55 - 1.02 mg/dL    BUN/Creatinine ratio 40 (H) 12 - 20      GFR est AA >60 >60 ml/min/1.73m2    GFR est non-AA 54 (L) >60 ml/min/1.73m2    Calcium 10.3 (H) 8.5 - 10.1 mg/dL    Bilirubin, total 0.9 0.2 - 1.0 mg/dL    AST (SGOT) 22 15 - 37 U/L    ALT (SGPT) 27 12 - 78 U/L    Alk.  phosphatase 66 45 - 117 U/L    Protein, total 7.1 6.4 - 8.2 g/dL    Albumin 3.5 3.5 - 5.0 g/dL    Globulin 3.6 2.0 - 4.0 g/dL    A-G Ratio 1.0 (L) 1.1 - 2.2     TROPONIN-HIGH SENSITIVITY    Collection Time: 11/01/21 12:51 PM   Result Value Ref Range    Troponin-High Sensitivity 12 0 - 51 ng/L   LIPASE    Collection Time: 11/01/21 12:51 PM   Result Value Ref Range    Lipase 223 73 - 393 U/L   EKG, 12 LEAD, INITIAL    Collection Time: 11/01/21 12:51 PM   Result Value Ref Range    Ventricular Rate 61 BPM    Atrial Rate 61 BPM    P-R Interval 162 ms    QRS Duration 86 ms    Q-T Interval 394 ms    QTC Calculation (Bezet) 396 ms    Calculated P Axis 34 degrees    Calculated R Axis 12 degrees    Calculated T Axis 49 degrees    Diagnosis       Normal sinus rhythm  Normal ECG  When compared with ECG of 11-MAY-2020 13:43,  No significant change was found  Confirmed by Yazmin Grant ([id number]) on 11/1/2021 1:32:13 PM     ETHYL ALCOHOL    Collection Time: 11/01/21  1:30 PM   Result Value Ref Range    ALCOHOL(ETHYL),SERUM <4 <10 mg/dL   HEPATIC FUNCTION PANEL    Collection Time: 11/01/21  1:30 PM   Result Value Ref Range    Protein, total 7.3 6.4 - 8.2 g/dL    Albumin 3.5 3.5 - 5.0 g/dL    Globulin 3.8 2.0 - 4.0 g/dL    A-G Ratio 0.9 (L) 1.1 - 2.2      Bilirubin, total 0.9 0.2 - 1.0 mg/dL    Bilirubin, direct 0.3 (H) 0.0 - 0.2 mg/dL Alk. phosphatase 66 45 - 117 U/L    AST (SGOT) 27 15 - 37 U/L    ALT (SGPT) 27 12 - 78 U/L   TSH 3RD GENERATION    Collection Time: 11/01/21  1:30 PM   Result Value Ref Range    TSH 0.30 (L) 0.36 - 3.74 uIU/mL   AMMONIA    Collection Time: 11/01/21  2:08 PM   Result Value Ref Range    Ammonia 32 (H) <32 umol/L   URINALYSIS W/ REFLEX CULTURE    Collection Time: 11/01/21  3:55 PM    Specimen: Urine   Result Value Ref Range    Color Yellow/Straw      Appearance Turbid (A) Clear      Specific gravity 1.016 1.003 - 1.030      pH (UA) 5.0 5.0 - 8.0      Protein Negative Negative mg/dL    Glucose Negative Negative mg/dL    Ketone 20 (A) Negative mg/dL    Bilirubin Negative Negative      Blood Negative Negative      Urobilinogen 0.1 0.1 - 1.0 EU/dL    Nitrites Positive (A) Negative      Leukocyte Esterase Moderate (A) Negative      UA:UC IF INDICATED Urine Culture Ordered (A) Culture not indicated by UA result      WBC  0 - 4 /hpf    RBC 0-5 0 - 5 /hpf    Bacteria Negative Negative /hpf    Mucus Trace /lpf       Radiologic Studies -   XR CHEST PORT   Final Result   No plain film evidence for CHF or pneumonia. CT HEAD WO CONT   Final Result   No acute intracranial process. Other findings as above. CT Results  (Last 48 hours)                 11/01/21 1434  CT HEAD WO CONT Final result    Impression:  No acute intracranial process. Other findings as above. Narrative:  CT head, 11/1/2021       History: Altered mental status. Technique: No intravenous contrast was administered. Multiple contiguous axial   images were acquired from the vertex to the skull base. Coronal and sagittal   reconstruction was made.        All CT scans at this facility are performed using dose reduction optimization   techniques as appropriate to perform the exam including the following: Automated   exposure control, adjustments of the mA and/or kV according to patient size, or   use iterative reconstruction technique. Comparison: Including CT head 4/27/2018. Findings:  Cortical volume loss and ventricular system size are not   significantly changed. Periventricular and subcortical low attenuation is   nonspecific. Prior imaging studies indicate a history of multiple sclerosis. Gray-white differentiation is preserved. No acute intracranial hemorrhage or   midline shift is identified. The calvarium is intact. The orbits and globes are intact. The lenses are replaced. There is mild   opacification of the right sphenoid sinus without acute sinusitis. The   remainder of the paranasal sinuses and mastoid air cells are clear. CXR Results  (Last 48 hours)                 11/01/21 1523  XR CHEST PORT Final result    Impression:  No plain film evidence for CHF or pneumonia. Narrative:  Chest single view. Comparison chest series May 30, 2015. Similar coarse reticular markings present through the lungs. No gross   interstitial or alveolar pulmonary edema. Cardiac silhouette enlargement. No   pneumothorax or sizable pleural effusion. If you feel that you have not received excellent quality care or timely care, please ask to speak to the nurse manager. Please choose us in the future for your continued health care needs. ------------------------------------------------------------------------------------------------------------  The exam and treatment you received in the Emergency Department were for an urgent problem and are not intended as complete care. It is important that you follow-up with a doctor, nurse practitioner, or physician assistant to:  (1) confirm your diagnosis,  (2) re-evaluation of changes in your illness and treatment, and  (3) for ongoing care. If your symptoms become worse or you do not improve as expected and you are unable to reach your usual health care provider, you should return to the Emergency Department. We are available 24 hours a day. Please take your discharge instructions with you when you go to your follow-up appointment. If you have any problem arranging a follow-up appointment, contact the Emergency Department immediately. If a prescription has been provided, please have it filled as soon as possible to prevent a delay in treatment. Read the entire medication instruction sheet provided to you by the pharmacy. If you have any questions or reservations about taking the medication due to side effects or interactions with other medications, please call your primary care physician or contact the ER to speak with the charge nurse. Make an appointment with your family doctor or the physician you were referred to for follow-up of this visit as instructed on your discharge paperwork, as this is a mandatory follow-up. Return to the ER if you are unable to be seen or if you are unable to be seen in a timely manner. If you have any problem arranging the follow-up visit, contact the Emergency Department immediately.

## 2021-11-01 NOTE — Clinical Note
[address] [facility] EMERGENCY DEPT  [address] [place] [address] [place] [id number]  [phone]    Work/School Note    Date: 11/1/2021    To Whom It May concern:    Mallory Villanueva was seen and treated today in the emergency room by the following provider(s):  Attending Provider: Yuliana Bennett MD.      Mallory Villanueva is excused from work/school on 11/1/2021 through 11/4/2021. She is medically clear to return to work/school on 11/5/2021.         Sincerely,          Rimma Montoya MD

## 2021-11-01 NOTE — ED TRIAGE NOTES
Pt states she has recent dx of UTI. This morning has had multiple falls and whole body \"feels weird\" per pt.  Pt is A/O x4

## 2021-11-01 NOTE — ED PROVIDER NOTES
EMERGENCY DEPARTMENT HISTORY AND PHYSICAL EXAM      Date: 11/1/2021  Patient Name: John Bello      History of Presenting Illness     Chief Complaint   Patient presents with    Altered mental status    Fall       History Provided By: Patient and Patient's     HPI: John Bello, 73 y.o. female with a past medical history significant COPD, hypothyroidism, cirrhosis, recent UTI, MS presents to the ED with cc of falls, AMS. Per , patient confused intermittently today, did not recognize someone at [facility]. Had a fall from couch at home, possibly hit head on table. Had another fall at [facility] and hit her head. No LOC. Denies new neck or back pain. Denies unilateral weakness, numbness, tingling. Head \"feels funny\" but no HA. No extremity pain. Not on AC. Denies recent F/C/N/V/D. Has chronic intermittent watery diarrhea, unchanged, no hematochezia or melena. Denies dysuria, frequency, new abd pain. States suprapubically still sore from UTI but improving, not worse. Denies new cough or sick contacts. Denies SOB, CP, palpitations, lightheadedness. Has felt off-balance today. There are no other complaints, changes, or physical findings at this time. PCP: Huma Otero MD    Current Facility-Administered Medications   Medication Dose Route Frequency Provider Last Rate Last Admin    sodium chloride 0.9 % bolus infusion 1,000 mL  1,000 mL IntraVENous ONCE Milana Parham MD         Current Outpatient Medications   Medication Sig Dispense Refill    levothyroxine (SYNTHROID) 25 mcg tablet Take 1 Tablet by mouth Daily (before breakfast). 90 Tablet 3    cephALEXin (Keflex) 250 mg capsule Take 1 Capsule by mouth three (3) times daily. 21 Capsule 0    glucose blood VI test strips (blood glucose test) strip Use to check BG twice daily. E11.65 200 Strip 3    cloNIDine HCL (CATAPRES) 0.1 mg tablet Take 0.1 mg by mouth daily.  If BP over 130      ferrous sulfate 325 mg (65 mg iron) tablet Take  by mouth Daily (before breakfast).  baclofen (LIORESAL) 10 mg tablet Take  by mouth. 1/2 tab PRN      methIMAzole (TAPAZOLE) 5 mg tablet TAKE 1 TABLET BY MOUTH DAILY (Patient not taking: Reported on 8/30/2021) 30 Tablet 3    methIMAzole (TAPAZOLE) 5 mg tablet TAKE 1 TABLET BY MOUTH DAILY (Patient not taking: Reported on 8/30/2021) 30 Tablet 3    suvorexant (Belsomra) 10 mg tablet Take 5 mg by mouth nightly.  colestipoL (COLESTID) 1 gram tablet Take 1 g by mouth three (3) times daily.  lipase-protease-amylase (CREON 24,000) 24,000-76,000 -120,000 unit capsule Take 2 Caps by mouth three (3) times daily (with meals).  nadoloL (CORGARD) 20 mg tablet Take 40 mg by mouth daily.  vitamin e (E GEMS) 100 unit capsule Take 100 Units by mouth daily.  albuterol (PROVENTIL HFA, VENTOLIN HFA, PROAIR HFA) 90 mcg/actuation inhaler       hydroCHLOROthiazide (MICROZIDE) 12.5 mg capsule Take 1 Cap by mouth daily.  lisinopriL (PRINIVIL, ZESTRIL) 40 mg tablet Take 1 Tab by mouth daily.  montelukast (SINGULAIR) 10 mg tablet Take 1 Tab by mouth daily.  omeprazole (PRILOSEC) 20 mg capsule Take 1 Cap by mouth daily.  topiramate (TOPAMAX) 50 mg tablet Take 2 Tabs by mouth nightly. (Patient not taking: Reported on 8/30/2021)      Tecfidera 240 mg cpDR Take 1 Tab by mouth two (2) times a day.  aspirin delayed-release 81 mg tablet Take 81 mg by mouth nightly.  multivitamin (ONE A DAY) tablet Take 1 Tab by mouth daily.  simethicone (GAS RELIEF) 80 mg chewable tablet Take 80 mg by mouth every six (6) hours as needed for Flatulence.  tiotropium (SPIRIVA WITH HANDIHALER) 18 mcg inhalation capsule Take 1 Cap by inhalation daily. (Patient not taking: Reported on 8/30/2021)      loperamide (IMODIUM) 2 mg capsule TK ONE C PO  BID PRN  0    fluticasone-salmeterol (ADVAIR DISKUS) 250-50 mcg/dose diskus inhaler Take 1 Puff by inhalation every twelve (12) hours.          Past History Past Medical History:  Past Medical History:   Diagnosis Date    Acid reflux     Arthritis     Asthma     Back pain     Chronic obstructive pulmonary disease (HCC)     Cirrhosis of liver (HCC)     Diabetes ([facility] [address]     Headache     Hepatitis C     Hiatal hernia     Hypertension     Multiple sclerosis (HCC)     Seizures (HCC)     Sleep apnea        Past Surgical History:  Past Surgical History:   Procedure Laterality Date    HX CHOLECYSTECTOMY      HX HYSTERECTOMY      HX HYSTERECTOMY      HX LIPECTOMY  05/15/2020    HX ORTHOPAEDIC      wrist surgery     HX TONSILLECTOMY         Family History:  Family History   Problem Relation Age of Onset    Diabetes Father     Heart Attack Father     Diabetes Brother     Stroke Mother        Social History:  Social History     Tobacco Use    Smoking status: Never Smoker    Smokeless tobacco: Never Used   Vaping Use    Vaping Use: Never used   Substance Use Topics    Alcohol use: Yes     Comment: on occasion     Drug use: Not Currently       Allergies: Allergies   Allergen Reactions    Iodinated Contrast Media Anaphylaxis    Gluten Diarrhea    Lactose Diarrhea    Milk Containing Products Unknown (comments)         Review of Systems   Constitutional: Negative except as in HPI. Eyes: Negative except as in HPI.  ENT: Negative except as in HPI. Cardiovascular: Negative except as in HPI. Respiratory: Negative except as in HPI. Gastrointestinal: Negative except as in HPI. Genitourinary: Negative except as in HPI. Musculoskeletal: Negative except as in HPI. Integumentary: Negative except as in HPI. Neurological: Negative except as in HPI. Psychiatric: Negative except as in HPI. Endocrine: Negative except as in HPI. Hematologic/Lymphatic: Negative except as in HPI. Allergic/Immunologic: Negative except as in HPI.     Physical Exam   Constitutional: Awake and alert, interactive, NAD, slow speech  Eyes: PERRL, no injection or scleral icterus, no discharge  HEENT: NCAT, neck supple, MMM, no oropharyngeal exudates  CV: RRR, no m/r/g  Respiratory: CTAB, no r/r/w  GI: Abd soft, nondistended, nontender  : Deferred  MSK: No joint effusions or edema. Joints palpated without tenderness. Skin: No rashes  Neuro: CN2-12 intact, 5/5 strength throughout. SILT distally. No dysmetria. No pronator drift. Gait deferred. No Asterixis. Psych: Well-groomed, normal speech, behavior, appropriate mood  Lymph: No LAD    Lab and Diagnostic Study Results     Labs -     Recent Results (from the past 12 hour(s))   CBC WITH AUTOMATED DIFF    Collection Time: 11/01/21 12:51 PM   Result Value Ref Range    WBC 6.7 3.6 - 11.0 K/uL    RBC 4.14 3.80 - 5.20 M/uL    HGB 12.3 11.5 - 16.0 g/dL    HCT 36.9 35.0 - 47.0 %    MCV 89.1 80.0 - 99.0 FL    MCH 29.7 26.0 - 34.0 PG    MCHC 33.3 30.0 - 36.5 g/dL    RDW 14.5 11.5 - 14.5 %    PLATELET 033 928 - 771 K/uL    MPV 11.7 8.9 - 12.9 FL    NRBC 0.0 0.0  WBC    ABSOLUTE NRBC 0.00 0.00 - 0.01 K/uL    NEUTROPHILS 60 32 - 75 %    LYMPHOCYTES 27 12 - 49 %    MONOCYTES 9 5 - 13 %    EOSINOPHILS 3 0 - 7 %    BASOPHILS 1 0 - 1 %    IMMATURE GRANULOCYTES 0 0 - 0.5 %    ABS. NEUTROPHILS 4.0 1.8 - 8.0 K/UL    ABS. LYMPHOCYTES 1.8 0.8 - 3.5 K/UL    ABS. MONOCYTES 0.6 0.0 - 1.0 K/UL    ABS. EOSINOPHILS 0.2 0.0 - 0.4 K/UL    ABS. BASOPHILS 0.0 0.0 - 0.1 K/UL    ABS. IMM. GRANS. 0.0 0.00 - 0.04 K/UL    DF AUTOMATED         Radiologic Studies -   [unfilled]  CT Results  (Last 48 hours)    None        CXR Results  (Last 48 hours)    None          Medical Decision Making and ED Course   - I am the first and primary provider for this patient AND AM THE PRIMARY PROVIDER OF RECORD. - I reviewed the vital signs, available nursing notes, past medical history, past surgical history, family history and social history. - Initial assessment performed.  The patients presenting problems have been discussed, and the staff are in agreement with the care plan formulated and outlined with them. I have encouraged them to ask questions as they arise throughout their visit. Vital Signs-Reviewed the patient's vital signs. Patient Vitals for the past 12 hrs:   Temp Pulse Resp BP SpO2   11/01/21 1240 98.4 °F (36.9 °C) 70 16 (!) 147/96 99 %         ED Course:       ED Course as of 11/03/21 0911   Mon Nov 01, 2021   1654 UA positive for UTI. Failing keflex, will trial ceftriaxone (avoiding bactrim given ACEI usage) and if unable to walk 2/2 weakness will admit. [YA]   1911 Ambulating without difficulty. Will d/c with omnicef given hx of hyperK and have pt f/u. [YA]      ED Course User Index  [YA] Milana Parham MD         Provider Notes (Medical Decision Making):     29V w/AMS, falls. Broad differential including myxedema coma, hyperammonemia, stroke incl. Traumatic bleed. Less likely urosepsis however within differential. Will also check EKG, electrolytes. IVF for now, will reassess after labs and imaging. Disposition     Disposition: DC- Adult Discharges: All of the diagnostic tests were reviewed and questions answered. Diagnosis, care plan and treatment options were discussed. The patient understands the instructions and will follow up as directed. The patients results have been reviewed with them. They have been counseled regarding their diagnosis. The patient verbally convey understanding and agreement of the signs, symptoms, diagnosis, treatment and prognosis and additionally agrees to follow up as recommended with their PCP in 24 - 48 hours. They also agree with the care-plan and convey that all of their questions have been answered.   I have also put together some discharge instructions for them that include: 1) educational information regarding their diagnosis, 2) how to care for their diagnosis at home, as well a 3) list of reasons why they would want to return to the ED prior to their follow-up appointment, should their condition change. DISCHARGE PLAN:  1. Current Discharge Medication List      CONTINUE these medications which have NOT CHANGED    Details   levothyroxine (SYNTHROID) 25 mcg tablet Take 1 Tablet by mouth Daily (before breakfast). Qty: 90 Tablet, Refills: 3    Associated Diagnoses: Acquired hypothyroidism      cephALEXin (Keflex) 250 mg capsule Take 1 Capsule by mouth three (3) times daily. Qty: 21 Capsule, Refills: 0      glucose blood VI test strips (blood glucose test) strip Use to check BG twice daily. E11.65  Qty: 200 Strip, Refills: 3    Comments: Pt stated she needs accu chek strips, but did not specify which type of accu chek. Please dispense whichever type/ brand pt needs. Thanks. Associated Diagnoses: Type 2 diabetes mellitus with hyperglycemia, without long-term current use of insulin ([facility])      cloNIDine HCL (CATAPRES) 0.1 mg tablet Take 0.1 mg by mouth daily. If BP over 130      ferrous sulfate 325 mg (65 mg iron) tablet Take  by mouth Daily (before breakfast). baclofen (LIORESAL) 10 mg tablet Take  by mouth. 1/2 tab PRN      !! methIMAzole (TAPAZOLE) 5 mg tablet TAKE 1 TABLET BY MOUTH DAILY  Qty: 30 Tablet, Refills: 3    Associated Diagnoses: Hyperthyroidism      !! methIMAzole (TAPAZOLE) 5 mg tablet TAKE 1 TABLET BY MOUTH DAILY  Qty: 30 Tablet, Refills: 3    Associated Diagnoses: Hyperthyroidism      suvorexant (Belsomra) 10 mg tablet Take 5 mg by mouth nightly. colestipoL (COLESTID) 1 gram tablet Take 1 g by mouth three (3) times daily. lipase-protease-amylase (CREON 24,000) 24,000-76,000 -120,000 unit capsule Take 2 Caps by mouth three (3) times daily (with meals). nadoloL (CORGARD) 20 mg tablet Take 40 mg by mouth daily. vitamin e (E GEMS) 100 unit capsule Take 100 Units by mouth daily. albuterol (PROVENTIL HFA, VENTOLIN HFA, PROAIR HFA) 90 mcg/actuation inhaler       hydroCHLOROthiazide (MICROZIDE) 12.5 mg capsule Take 1 Cap by mouth daily.       lisinopriL (PRINIVIL, ZESTRIL) 40 mg tablet Take 1 Tab by mouth daily. montelukast (SINGULAIR) 10 mg tablet Take 1 Tab by mouth daily. omeprazole (PRILOSEC) 20 mg capsule Take 1 Cap by mouth daily. topiramate (TOPAMAX) 50 mg tablet Take 2 Tabs by mouth nightly. Tecfidera 240 mg cpDR Take 1 Tab by mouth two (2) times a day. aspirin delayed-release 81 mg tablet Take 81 mg by mouth nightly. multivitamin (ONE A DAY) tablet Take 1 Tab by mouth daily. simethicone (GAS RELIEF) 80 mg chewable tablet Take 80 mg by mouth every six (6) hours as needed for Flatulence. tiotropium (SPIRIVA WITH HANDIHALER) 18 mcg inhalation capsule Take 1 Cap by inhalation daily. loperamide (IMODIUM) 2 mg capsule TK ONE C PO  BID PRN  Refills: 0      fluticasone-salmeterol (ADVAIR DISKUS) 250-50 mcg/dose diskus inhaler Take 1 Puff by inhalation every twelve (12) hours. !! - Potential duplicate medications found. Please discuss with provider. 2.   Follow-up Information     Follow up With Specialties Details Why Contact Info    Radha Siegel MD Internal Medicine In 2 days  [facility] [place] [address]  [phone]          3. Return to ED if worse   4. Current Discharge Medication List          Diagnosis     Clinical Impression:   1. Acute cystitis without hematuria    2. Fall, initial encounter        Attestations: Ghazal Torres MD    Please note that this dictation was completed with [employer], the computer voice recognition software. Quite often unanticipated grammatical, syntax, homophones, and other interpretive errors are inadvertently transcribed by the computer software. Please disregard these errors. Please excuse any errors that have escaped final proofreading. Thank you.

## 2021-11-01 NOTE — Clinical Note
[facility] EMERGENCY DEPT  [address] [facility] [place] [id number]  [phone]    Work/School Note    Date: 11/1/2021    To Whom It May concern:      Lenore Garrett was seen and treated today in the emergency room by the following provider(s):  Attending Provider: Debby Rich MD.      Lenore Garrett is excused from work/school on 11/01/21. She is clear to return to work/school on 11/02/21.         Sincerely,          Juanito Weiner MD

## 2021-11-04 LAB
BACTERIA SPEC CULT: ABNORMAL
COLONY COUNT,CNT: ABNORMAL
SPECIAL REQUESTS,SREQ: ABNORMAL

## 2021-12-28 LAB
T4 FREE SERPL-MCNC: 1.71 NG/DL (ref 0.82–1.77)
TSH SERPL DL<=0.005 MIU/L-ACNC: 0.66 UIU/ML (ref 0.45–4.5)

## 2022-01-05 ENCOUNTER — OFFICE VISIT (OUTPATIENT)
Dept: ENDOCRINOLOGY | Age: 72
End: 2022-01-05
Payer: MEDICARE

## 2022-01-05 VITALS
RESPIRATION RATE: 20 BRPM | BODY MASS INDEX: 25.03 KG/M2 | HEIGHT: 62 IN | SYSTOLIC BLOOD PRESSURE: 141 MMHG | TEMPERATURE: 97.7 F | OXYGEN SATURATION: 100 % | DIASTOLIC BLOOD PRESSURE: 54 MMHG | HEART RATE: 76 BPM | WEIGHT: 136 LBS

## 2022-01-05 DIAGNOSIS — E03.9 ACQUIRED HYPOTHYROIDISM: ICD-10-CM

## 2022-01-05 DIAGNOSIS — E05.80 IATROGENIC HYPERTHYROIDISM: ICD-10-CM

## 2022-01-05 DIAGNOSIS — I10 ESSENTIAL HYPERTENSION: ICD-10-CM

## 2022-01-05 DIAGNOSIS — E11.65 TYPE 2 DIABETES MELLITUS WITH HYPERGLYCEMIA, WITHOUT LONG-TERM CURRENT USE OF INSULIN (HCC): Primary | ICD-10-CM

## 2022-01-05 LAB — HBA1C MFR BLD HPLC: 5.3 %

## 2022-01-05 PROCEDURE — G8754 DIAS BP LESS 90: HCPCS | Performed by: INTERNAL MEDICINE

## 2022-01-05 PROCEDURE — 2022F DILAT RTA XM EVC RTNOPTHY: CPT | Performed by: INTERNAL MEDICINE

## 2022-01-05 PROCEDURE — 3044F HG A1C LEVEL LT 7.0%: CPT | Performed by: INTERNAL MEDICINE

## 2022-01-05 PROCEDURE — 83036 HEMOGLOBIN GLYCOSYLATED A1C: CPT | Performed by: INTERNAL MEDICINE

## 2022-01-05 PROCEDURE — G8536 NO DOC ELDER MAL SCRN: HCPCS | Performed by: INTERNAL MEDICINE

## 2022-01-05 PROCEDURE — 99214 OFFICE O/P EST MOD 30 MIN: CPT | Performed by: INTERNAL MEDICINE

## 2022-01-05 PROCEDURE — G8427 DOCREV CUR MEDS BY ELIG CLIN: HCPCS | Performed by: INTERNAL MEDICINE

## 2022-01-05 PROCEDURE — G8400 PT W/DXA NO RESULTS DOC: HCPCS | Performed by: INTERNAL MEDICINE

## 2022-01-05 PROCEDURE — 1090F PRES/ABSN URINE INCON ASSESS: CPT | Performed by: INTERNAL MEDICINE

## 2022-01-05 PROCEDURE — 3017F COLORECTAL CA SCREEN DOC REV: CPT | Performed by: INTERNAL MEDICINE

## 2022-01-05 PROCEDURE — 1101F PT FALLS ASSESS-DOCD LE1/YR: CPT | Performed by: INTERNAL MEDICINE

## 2022-01-05 PROCEDURE — G8420 CALC BMI NORM PARAMETERS: HCPCS | Performed by: INTERNAL MEDICINE

## 2022-01-05 PROCEDURE — G8753 SYS BP > OR = 140: HCPCS | Performed by: INTERNAL MEDICINE

## 2022-01-05 PROCEDURE — G8432 DEP SCR NOT DOC, RNG: HCPCS | Performed by: INTERNAL MEDICINE

## 2022-01-05 RX ORDER — MELOXICAM 15 MG/1
15 TABLET ORAL DAILY
COMMUNITY
End: 2022-06-06

## 2022-01-05 RX ORDER — LEVOTHYROXINE SODIUM 25 UG/1
25 TABLET ORAL
Qty: 90 TABLET | Refills: 3 | Status: SHIPPED | OUTPATIENT
Start: 2022-01-05

## 2022-01-05 NOTE — PROGRESS NOTES
Adrianna Lynn is a 73 y.o. female here for   Chief Complaint   Patient presents with    Diabetes    Thyroid Problem       1. Have you been to the ER, urgent care clinic since your last visit? Hospitalized since your last visit? -[facility] ER a couple months ago for MS relapse and UTI    2. Have you seen or consulted any other health care providers outside of the [address] [place] [address] since your last visit?   Include any pap smears or colon screening.-no

## 2022-01-05 NOTE — LETTER
1/8/2022    Patient: Catherine Nageotte   YOB: 1950   Date of Visit: 1/5/2022     [address] [id number]  Via Fax: [phone]    Dear Alexis Puckett MD,      Thank you for referring Ms. Villa Logan to [address] [place] [address] for evaluation. My notes for this consultation are attached. If you have questions, please do not hesitate to call me. I look forward to following your patient along with you.       Sincerely,    Miesha Schwab MD

## 2022-01-05 NOTE — PROGRESS NOTES
Anita Dominguez MD        Patient Information  Date:1/8/2022  Name : Miguel Zaragoza 73 y.o.     YOB: 1950         Referred by: Kristina Koroma MD     Chief Complaint   Patient presents with    Diabetes    Thyroid Problem     History of Present Illness: Miguel Zaragoza is a 73 y.o. female here for follow-up of  Type 2 Diabetes Mellitus. Type 2 Diabetes was diagnosed 2017  . UTI in Dec 2021- confused and was dehydrated . Went to ER     On LT4 25 mcg , had Hashitoxicosis , was on MMI       Prior hx   HYperthyroidism - on MMI, she is back on levothyroxine, says oncology started her back on levothyroxine. TSH was slightly elevated while she was on methimazole and Dr. Tatiana Lazo started her back on levothyroxine  Advised patient to call me before she changes any of the thyroid medication in the future. She has been gaining weight now, eating better, she was losing weight when she had hashitoxicosis. She was on methimazole for some time which helped but she self discontinued now.   Cardiovascular risk factors: dyslipidemia, diabetes mellitus   She is off insulin    Her regular weight is 140 pounds, she is now 156 pounds      Current exercise: housecleaning, no regular exercise    Wt Readings from Last 3 Encounters:   01/05/22 136 lb (61.7 kg)   11/01/21 140 lb (63.5 kg)   10/04/21 141 lb (64 kg)       BP Readings from Last 3 Encounters:   01/05/22 (!) 141/54   11/01/21 (!) 187/66   10/04/21 (!) 197/75           Past Medical History:   Diagnosis Date    Acid reflux     Arthritis     Asthma     Back pain     Chronic obstructive pulmonary disease (HCC)     Cirrhosis of liver (HCC)     Diabetes (HCC)     Headache     Hepatitis C     Hiatal hernia     Hypertension     Multiple sclerosis (HCC)     Seizures (HCC)     Sleep apnea      Current Outpatient Medications   Medication Sig    meloxicam (MOBIC) 15 mg tablet Take 15 mg by mouth daily.    glucose blood VI test strips (blood glucose test) strip Use to check BG twice daily. E11.65    cloNIDine HCL (CATAPRES) 0.1 mg tablet Take 0.1 mg by mouth daily. If BP over 130    baclofen (LIORESAL) 10 mg tablet Take  by mouth. 1/2 tab PRN    colestipoL (COLESTID) 1 gram tablet Take 1 g by mouth three (3) times daily.  lipase-protease-amylase (CREON 24,000) 24,000-76,000 -120,000 unit capsule Take 2 Caps by mouth three (3) times daily (with meals).  nadoloL (CORGARD) 20 mg tablet Take 40 mg by mouth daily.  vitamin e (E GEMS) 100 unit capsule Take 100 Units by mouth daily.  albuterol (PROVENTIL HFA, VENTOLIN HFA, PROAIR HFA) 90 mcg/actuation inhaler     hydroCHLOROthiazide (MICROZIDE) 12.5 mg capsule Take 1 Cap by mouth daily.  lisinopriL (PRINIVIL, ZESTRIL) 40 mg tablet Take 1 Tab by mouth daily.  montelukast (SINGULAIR) 10 mg tablet Take 1 Tab by mouth daily.  omeprazole (PRILOSEC) 20 mg capsule Take 1 Cap by mouth daily.  Tecfidera 240 mg cpDR Take 1 Tab by mouth two (2) times a day.  aspirin delayed-release 81 mg tablet Take 81 mg by mouth nightly.  multivitamin (ONE A DAY) tablet Take 1 Tab by mouth daily.  simethicone (GAS RELIEF) 80 mg chewable tablet Take 80 mg by mouth every six (6) hours as needed for Flatulence.  loperamide (IMODIUM) 2 mg capsule TK ONE C PO  BID PRN    fluticasone-salmeterol (ADVAIR DISKUS) 250-50 mcg/dose diskus inhaler Take 1 Puff by inhalation every twelve (12) hours.  levothyroxine (SYNTHROID) 25 mcg tablet Take 1 Tablet by mouth Daily (before breakfast). No current facility-administered medications for this visit.      Allergies   Allergen Reactions    Iodinated Contrast Media Anaphylaxis    Gluten Diarrhea    Lactose Diarrhea    Milk Containing Products Unknown (comments)       Review of Systems: Per HPI    Physical Examination:   Blood pressure (!) 141/54, pulse 76, temperature 97.7 °F (36.5 °C), temperature source Temporal, resp. rate 20, height 5' 2\" (1.575 m), weight 136 lb (61.7 kg), SpO2 100 %. Estimated body mass index is 24.87 kg/m² as calculated from the following:    Height as of this encounter: 5' 2\" (1.575 m). -   Weight as of this encounter: 136 lb (61.7 kg). - General: pleasant, no distress, good eye contact  - HEENT: no exophthalmos, no periorbital edema, EOMI  - Neck: No thyromegaly  - CVS: S1-S2 regular  - RS: Normal respiratory effort  - Musculoskeletal: no tremors  - Neurological: alert and oriented  - Psychiatric: normal mood and affect  - Skin: Normal color          Data Reviewed:       Lab Results   Component Value Date/Time    Hemoglobin A1c 7.3 (H) 08/23/2021 09:35 AM    Hemoglobin A1c 5.5 06/16/2020 09:50 AM    Hemoglobin A1c 5.8 (H) 01/23/2020 10:02 AM    Hemoglobin A1c, External 6.3 04/29/2015 10:39 AM    Glucose 147 (H) 11/01/2021 12:51 PM    Microalbumin/Creat ratio (mg/g creat) 10 01/23/2020 10:02 AM    Microalbumin,urine random 0.88 01/23/2020 10:02 AM    LDL,Direct 110 (H) 08/29/2019 08:31 AM    Creatinine (POC) 0.7 01/21/2018 01:25 PM    Creatinine 1.02 11/01/2021 12:51 PM      Lab Results   Component Value Date/Time    GFR est non-AA 54 (L) 11/01/2021 12:51 PM    GFRNA, POC >60 01/21/2018 01:25 PM    GFR est AA >60 11/01/2021 12:51 PM    GFRAA, POC >60 01/21/2018 01:25 PM    Creatinine 1.02 11/01/2021 12:51 PM    Creatinine (POC) 0.7 01/21/2018 01:25 PM    BUN 41 (H) 11/01/2021 12:51 PM    Sodium 144 11/01/2021 12:51 PM    Potassium 4.2 11/01/2021 12:51 PM    Chloride 114 (H) 11/01/2021 12:51 PM    CO2 25 11/01/2021 12:51 PM    PTH, Intact 37 07/20/2020 07:31 AM       Assessment/Plan:     1. Type 2 diabetes mellitus with hyperglycemia, without long-term current use of insulin (HCC)    2. Iatrogenic hyperthyroidism    3. Essential hypertension        1.  Type 2 Diabetes Mellitus  Lab Results   Component Value Date/Time    Hemoglobin A1c 7.3 (H) 08/23/2021 09:35 AM    Hemoglobin A1c (POC) 5.3 01/05/2022 01:45 PM    Hemoglobin A1c, External 6.3 04/29/2015 10:39 AM   controlled   Janumet    No history of pancreatitis  Gastroenterologist - Stacey Brochure. She was diagnosed with exogenous pancreatic insufficiency, on Creon      2. HTN : Continue current therapy     3. Hyperlipidemia : myalgia, off statin and refused     4. History of hepatitis C    5. Hashitoxicosis - off LT4 2020  NM scan - 45 % diffuse uptake  Off MMI   On LT4 now       MS         Patient Instructions   No Sea weed or Kelp     Follow-up and Dispositions    · Return in about 4 months (around 5/5/2022) for labs before next visit and follow up. Thank you for allowing me to participate in the care of this patient. Jeff Prieto MD      Patient verbalized understanding     Voice-recognition software was used to generate this report, which may result in some phonetic-based errors in the grammar and contents. Even though attempts were made to correct all the mistakes, some may have been missed and remained in the body of the report.

## 2022-02-10 ENCOUNTER — TRANSCRIBE ORDER (OUTPATIENT)
Dept: SCHEDULING | Age: 72
End: 2022-02-10

## 2022-02-10 DIAGNOSIS — G35 MULTIPLE SCLEROSIS (HCC): Primary | ICD-10-CM

## 2022-02-24 DIAGNOSIS — E11.65 TYPE 2 DIABETES MELLITUS WITH HYPERGLYCEMIA, UNSPECIFIED WHETHER LONG TERM INSULIN USE (HCC): ICD-10-CM

## 2022-02-24 RX ORDER — SITAGLIPTIN AND METFORMIN HYDROCHLORIDE 50; 1000 MG/1; MG/1
TABLET, FILM COATED, EXTENDED RELEASE ORAL
Qty: 180 TABLET | Refills: 3 | Status: SHIPPED | OUTPATIENT
Start: 2022-02-24

## 2022-02-25 ENCOUNTER — HOSPITAL ENCOUNTER (OUTPATIENT)
Dept: MRI IMAGING | Age: 72
Discharge: HOME | End: 2022-02-25
Payer: MEDICARE

## 2022-02-25 VITALS — BODY MASS INDEX: 23.78 KG/M2 | WEIGHT: 130 LBS

## 2022-02-25 DIAGNOSIS — G35 MULTIPLE SCLEROSIS (HCC): ICD-10-CM

## 2022-02-25 LAB — CREAT BLD-MCNC: 0.8 MG/DL (ref 0.6–1.3)

## 2022-02-25 PROCEDURE — 74011250636 HC RX REV CODE- 250/636: Performed by: PSYCHIATRY & NEUROLOGY

## 2022-02-25 PROCEDURE — A9577 INJ MULTIHANCE: HCPCS | Performed by: PSYCHIATRY & NEUROLOGY

## 2022-02-25 PROCEDURE — 82565 ASSAY OF CREATININE: CPT

## 2022-02-25 PROCEDURE — 70553 MRI BRAIN STEM W/O & W/DYE: CPT

## 2022-02-25 PROCEDURE — 72156 MRI NECK SPINE W/O & W/DYE: CPT

## 2022-02-25 RX ADMIN — GADOBENATE DIMEGLUMINE 12 ML: 529 INJECTION, SOLUTION INTRAVENOUS at 12:11

## 2022-03-18 PROBLEM — G47.33 OBSTRUCTIVE SLEEP APNEA SYNDROME: Status: ACTIVE | Noted: 2019-10-30

## 2022-03-18 PROBLEM — E11.65 TYPE 2 DIABETES MELLITUS WITH HYPERGLYCEMIA, WITHOUT LONG-TERM CURRENT USE OF INSULIN (HCC): Status: ACTIVE | Noted: 2019-03-02

## 2022-03-19 PROBLEM — I10 ESSENTIAL HYPERTENSION: Status: ACTIVE | Noted: 2019-03-02

## 2022-03-19 PROBLEM — M79.602 LEFT ARM PAIN: Status: ACTIVE | Noted: 2018-01-31

## 2022-03-19 PROBLEM — K74.60 CIRRHOSIS OF LIVER (HCC): Status: ACTIVE | Noted: 2019-10-30

## 2022-03-19 PROBLEM — M19.90 ARTHRITIS: Status: ACTIVE | Noted: 2019-10-30

## 2022-03-19 PROBLEM — E05.80 IATROGENIC HYPERTHYROIDISM: Status: ACTIVE | Noted: 2020-12-25

## 2022-04-19 NOTE — PROGRESS NOTES
Faxed BELINDA to Dr Darylene Pian for recent lab results, particularly CBC w/Diff and BMP.
Initial (On Arrival)

## 2022-04-28 LAB
ALBUMIN/CREAT UR: 38 MG/G CREAT (ref 0–29)
BUN SERPL-MCNC: 28 MG/DL (ref 8–27)
BUN/CREAT SERPL: 30 (ref 12–28)
CALCIUM SERPL-MCNC: 10.7 MG/DL (ref 8.7–10.3)
CHLORIDE SERPL-SCNC: 105 MMOL/L (ref 96–106)
CO2 SERPL-SCNC: 23 MMOL/L (ref 20–29)
CREAT SERPL-MCNC: 0.92 MG/DL (ref 0.57–1)
CREAT UR-MCNC: 72.7 MG/DL
EGFR: 67 ML/MIN/1.73
EST. AVERAGE GLUCOSE BLD GHB EST-MCNC: 117 MG/DL
GLUCOSE SERPL-MCNC: 102 MG/DL (ref 65–99)
HBA1C MFR BLD: 5.7 % (ref 4.8–5.6)
LDLC SERPL DIRECT ASSAY-MCNC: 81 MG/DL (ref 0–99)
MICROALBUMIN UR-MCNC: 27.4 UG/ML
POTASSIUM SERPL-SCNC: 4.4 MMOL/L (ref 3.5–5.2)
SODIUM SERPL-SCNC: 142 MMOL/L (ref 134–144)
T4 FREE SERPL-MCNC: 1.61 NG/DL (ref 0.82–1.77)
TSH SERPL DL<=0.005 MIU/L-ACNC: 1.09 UIU/ML (ref 0.45–4.5)

## 2022-06-06 ENCOUNTER — OFFICE VISIT (OUTPATIENT)
Dept: ENDOCRINOLOGY | Age: 72
End: 2022-06-06
Payer: MEDICARE

## 2022-06-06 VITALS
TEMPERATURE: 96.7 F | WEIGHT: 130 LBS | HEART RATE: 73 BPM | HEIGHT: 62 IN | BODY MASS INDEX: 23.92 KG/M2 | OXYGEN SATURATION: 98 % | DIASTOLIC BLOOD PRESSURE: 47 MMHG | RESPIRATION RATE: 20 BRPM | SYSTOLIC BLOOD PRESSURE: 111 MMHG

## 2022-06-06 DIAGNOSIS — E83.52 HYPERCALCEMIA: ICD-10-CM

## 2022-06-06 DIAGNOSIS — E11.65 TYPE 2 DIABETES MELLITUS WITH HYPERGLYCEMIA, WITHOUT LONG-TERM CURRENT USE OF INSULIN (HCC): Primary | ICD-10-CM

## 2022-06-06 DIAGNOSIS — E55.9 VITAMIN D DEFICIENCY: ICD-10-CM

## 2022-06-06 DIAGNOSIS — I10 ESSENTIAL HYPERTENSION: ICD-10-CM

## 2022-06-06 PROCEDURE — 1101F PT FALLS ASSESS-DOCD LE1/YR: CPT | Performed by: INTERNAL MEDICINE

## 2022-06-06 PROCEDURE — 3017F COLORECTAL CA SCREEN DOC REV: CPT | Performed by: INTERNAL MEDICINE

## 2022-06-06 PROCEDURE — G8420 CALC BMI NORM PARAMETERS: HCPCS | Performed by: INTERNAL MEDICINE

## 2022-06-06 PROCEDURE — G8752 SYS BP LESS 140: HCPCS | Performed by: INTERNAL MEDICINE

## 2022-06-06 PROCEDURE — G8536 NO DOC ELDER MAL SCRN: HCPCS | Performed by: INTERNAL MEDICINE

## 2022-06-06 PROCEDURE — 3044F HG A1C LEVEL LT 7.0%: CPT | Performed by: INTERNAL MEDICINE

## 2022-06-06 PROCEDURE — 1123F ACP DISCUSS/DSCN MKR DOCD: CPT | Performed by: INTERNAL MEDICINE

## 2022-06-06 PROCEDURE — G8400 PT W/DXA NO RESULTS DOC: HCPCS | Performed by: INTERNAL MEDICINE

## 2022-06-06 PROCEDURE — G8754 DIAS BP LESS 90: HCPCS | Performed by: INTERNAL MEDICINE

## 2022-06-06 PROCEDURE — 1090F PRES/ABSN URINE INCON ASSESS: CPT | Performed by: INTERNAL MEDICINE

## 2022-06-06 PROCEDURE — 2022F DILAT RTA XM EVC RTNOPTHY: CPT | Performed by: INTERNAL MEDICINE

## 2022-06-06 PROCEDURE — 99214 OFFICE O/P EST MOD 30 MIN: CPT | Performed by: INTERNAL MEDICINE

## 2022-06-06 PROCEDURE — G8432 DEP SCR NOT DOC, RNG: HCPCS | Performed by: INTERNAL MEDICINE

## 2022-06-06 PROCEDURE — G8427 DOCREV CUR MEDS BY ELIG CLIN: HCPCS | Performed by: INTERNAL MEDICINE

## 2022-06-06 RX ORDER — CHOLECALCIFEROL (VITAMIN D3) 125 MCG
CAPSULE ORAL AS NEEDED
COMMUNITY

## 2022-06-06 NOTE — PROGRESS NOTES
Rickie De La Rosa is a 73 y.o. female here for   Chief Complaint   Patient presents with    Diabetes    Thyroid Problem       1. Have you been to the ER, urgent care clinic since your last visit? Hospitalized since your last visit? -no    2. Have you seen or consulted any other health care providers outside of the [address] [place] [address] since your last visit? Include any pap smears or colon screening. -PCP and Pulmonary Dr. Lannis Libman

## 2022-06-06 NOTE — PROGRESS NOTES
Lukas Hammond MD        Patient Information  Date:6/6/2022  Name : Cuong Cody 73 y.o.     YOB: 1950         Referred by: Denver Benedict, MD     Chief Complaint   Patient presents with    Diabetes    Thyroid Problem     History of Present Illness: Cuong Cody is a 73 y.o. female here for follow-up of  Type 2 Diabetes Mellitus. Type 2 Diabetes was diagnosed 2017  . No hypoglycemia  On LT4 25 mcg , had Hashitoxicosis , was on MMI   Frequent UTI     Prior hx   HYperthyroidism - on MMI, she is back on levothyroxine, says oncology started her back on levothyroxine. TSH was slightly elevated while she was on methimazole and Dr. Otto Williamson started her back on levothyroxine  Advised patient to call me before she changes any of the thyroid medication in the future. She has been gaining weight now, eating better, she was losing weight when she had hashitoxicosis. She was on methimazole for some time which helped but she self discontinued now. Cardiovascular risk factors: dyslipidemia, diabetes mellitus   She is off insulin    Her regular weight is 140 pounds      Current exercise: housecleaning, no regular exercise    Wt Readings from Last 3 Encounters:   06/06/22 130 lb (59 kg)   02/25/22 130 lb (59 kg)   01/05/22 136 lb (61.7 kg)       BP Readings from Last 3 Encounters:   06/06/22 (!) 111/47   01/05/22 (!) 141/54   11/01/21 (!) 187/66           Past Medical History:   Diagnosis Date    Acid reflux     Arthritis     Asthma     Back pain     Chronic obstructive pulmonary disease (HCC)     Cirrhosis of liver (HCC)     Diabetes ([facility] [address]     Headache     Hepatitis C     Hiatal hernia     Hypertension     Multiple sclerosis (HCC)     Seizures (HCC)     Sleep apnea      Current Outpatient Medications   Medication Sig    calcium carb/vit D3/minerals (CALCIUM-VITAMIN D PO) Take 2 Tablets by mouth daily.     lactase (Lactaid) 3,000 unit tablet Take  by mouth as needed.  Janumet XR 50-1,000 mg TM24 TAKE 1 TABLET BY MOUTH TWICE DAILY WITH MEALS    levothyroxine (SYNTHROID) 25 mcg tablet Take 1 Tablet by mouth Daily (before breakfast).  glucose blood VI test strips (blood glucose test) strip Use to check BG twice daily. E11.65    cloNIDine HCL (CATAPRES) 0.1 mg tablet Take 0.1 mg by mouth daily. If BP over 130    baclofen (LIORESAL) 10 mg tablet Take  by mouth. 1/2 tab PRN    colestipoL (COLESTID) 1 gram tablet Take 1 g by mouth three (3) times daily.  lipase-protease-amylase (CREON 24,000) 24,000-76,000 -120,000 unit capsule Take 2 Caps by mouth three (3) times daily (with meals).  nadoloL (CORGARD) 20 mg tablet Take 40 mg by mouth two (2) times a day.  albuterol (PROVENTIL HFA, VENTOLIN HFA, PROAIR HFA) 90 mcg/actuation inhaler     hydroCHLOROthiazide (MICROZIDE) 12.5 mg capsule Take 1 Cap by mouth daily.  lisinopriL (PRINIVIL, ZESTRIL) 40 mg tablet Take 1 Tab by mouth daily.  montelukast (SINGULAIR) 10 mg tablet Take 1 Tab by mouth daily.  omeprazole (PRILOSEC) 20 mg capsule Take 1 Cap by mouth daily.  Tecfidera 240 mg cpDR Take 1 Tab by mouth two (2) times a day.  aspirin delayed-release 81 mg tablet Take 81 mg by mouth nightly.  multivitamin (ONE A DAY) tablet Take 1 Tab by mouth daily.  simethicone (GAS RELIEF) 80 mg chewable tablet Take 80 mg by mouth every six (6) hours as needed for Flatulence.  loperamide (IMODIUM) 2 mg capsule TK ONE C PO  BID PRN    meloxicam (MOBIC) 15 mg tablet Take 15 mg by mouth daily. (Patient not taking: Reported on 6/6/2022)    vitamin e (E GEMS) 100 unit capsule Take 100 Units by mouth daily. (Patient not taking: Reported on 6/6/2022)    fluticasone-salmeterol (ADVAIR DISKUS) 250-50 mcg/dose diskus inhaler Take 1 Puff by inhalation every twelve (12) hours. (Patient not taking: Reported on 6/6/2022)     No current facility-administered medications for this visit. Allergies   Allergen Reactions    Iodinated Contrast Media Anaphylaxis    Gluten Diarrhea    Lactose Diarrhea    Milk Containing Products Unknown (comments)       Review of Systems: Per HPI    Physical Examination:   Blood pressure (!) 111/47, pulse 73, temperature (!) 96.7 °F (35.9 °C), temperature source Temporal, resp. rate 20, height 5' 2\" (1.575 m), weight 130 lb (59 kg), SpO2 98 %. Estimated body mass index is 23.78 kg/m² as calculated from the following:    Height as of this encounter: 5' 2\" (1.575 m). -   Weight as of this encounter: 130 lb (59 kg).   - General: pleasant, no distress, good eye contact  - HEENT: no exophthalmos, no periorbital edema, EOMI  - Neck: No thyromegaly  - CVS: S1-S2 regular  - RS: Normal respiratory effort  - Musculoskeletal: no tremors  - Neurological: alert and oriented  - Psychiatric: normal mood and affect  - Skin: Normal color          Data Reviewed:       Lab Results   Component Value Date/Time    Hemoglobin A1c 5.7 (H) 04/27/2022 07:53 AM    Hemoglobin A1c 7.3 (H) 08/23/2021 09:35 AM    Hemoglobin A1c 5.5 06/16/2020 09:50 AM    Hemoglobin A1c, External 6.3 04/29/2015 10:39 AM    Glucose 102 (H) 04/27/2022 07:53 AM    Microalbumin/Creat ratio (mg/g creat) 10 01/23/2020 10:02 AM    Microalb/Creat ratio (ug/mg creat.) 38 (H) 04/27/2022 07:53 AM    Microalbumin,urine random 0.88 01/23/2020 10:02 AM    LDL,Direct 81 04/27/2022 07:53 AM    Creatinine (POC) 0.8 02/25/2022 11:00 AM    Creatinine 0.92 04/27/2022 07:53 AM      Lab Results   Component Value Date/Time    GFR est non-AA 54 (L) 11/01/2021 12:51 PM    GFRNA, POC >60 02/25/2022 11:00 AM    GFR est AA >60 11/01/2021 12:51 PM    GFRAA, POC >60 02/25/2022 11:00 AM    Creatinine 0.92 04/27/2022 07:53 AM    Creatinine (POC) 0.8 02/25/2022 11:00 AM    BUN 28 (H) 04/27/2022 07:53 AM    Sodium 142 04/27/2022 07:53 AM    Potassium 4.4 04/27/2022 07:53 AM    Chloride 105 04/27/2022 07:53 AM    CO2 23 04/27/2022 07:53 AM PTH, Intact 37 07/20/2020 07:31 AM       Assessment/Plan:     1. Type 2 diabetes mellitus with hyperglycemia, without long-term current use of insulin (HCC)    2. Iatrogenic hyperthyroidism    3. Essential hypertension        1. Type 2 Diabetes Mellitus  Lab Results   Component Value Date/Time    Hemoglobin A1c 5.7 (H) 04/27/2022 07:53 AM    Hemoglobin A1c (POC) 5.3 01/05/2022 01:45 PM    Hemoglobin A1c, External 6.3 04/29/2015 10:39 AM   controlled   Janumet    No history of pancreatitis  Gastroenterologist - Elvira Allison. She was diagnosed with exogenous pancreatic insufficiency, on Creon      2. HTN : Continue current therapy     3. Hyperlipidemia : myalgia, off statin and refused     4. History of hepatitis C    5. Hashitoxicosis - off LT4 2020  NM scan - 45 % diffuse uptake  Off MMI   On LT4 now       6. MS     7. Hypercalcemia  - fluctuating   - on HCTZ, on calcium   Take only Vit D , stop calcium supplements   No kidney stones , Osteopenia   PTH,Vit D , BMP     8. Anemia - seeing Hematology ,     There are no Patient Instructions on file for this visit. Thank you for allowing me to participate in the care of this patient. Magda Marie MD      Patient verbalized understanding     Voice-recognition software was used to generate this report, which may result in some phonetic-based errors in the grammar and contents. Even though attempts were made to correct all the mistakes, some may have been missed and remained in the body of the report.

## 2022-06-23 ENCOUNTER — CLINICAL SUPPORT (OUTPATIENT)
Dept: DIABETES SERVICES | Age: 72
End: 2022-06-23
Payer: MEDICARE

## 2022-06-23 DIAGNOSIS — E11.65 TYPE 2 DIABETES MELLITUS WITH HYPERGLYCEMIA, WITHOUT LONG-TERM CURRENT USE OF INSULIN (HCC): Primary | ICD-10-CM

## 2022-06-23 PROCEDURE — 97802 MEDICAL NUTRITION INDIV IN: CPT | Performed by: DIETITIAN, REGISTERED

## 2022-06-23 NOTE — PROGRESS NOTES
[employer] [facility]  Diabetes Self-Management Education & Support Program  Initial Nutrition Assessment      Patient's Name: Ariel Cid  Date: 6/23/2022  Reason for Referral: Diarrhea  Referral Source: Mark Anthony Garcia MD    Nutrition Assessment:  Pertinent Medical History:  Past Medical History:   Diagnosis Date    Acid reflux     Arthritis     Asthma     Back pain     Chronic obstructive pulmonary disease (HCC)     Cirrhosis of liver ([address]     Diabetes ([address]     Headache     Hepatitis C     Hiatal hernia     Hypertension     Multiple sclerosis ([address]     Seizures ([address]     Sleep apnea      Past Surgical History:   Procedure Laterality Date    HX CHOLECYSTECTOMY      HX HYSTERECTOMY      HX HYSTERECTOMY      HX LIPECTOMY  05/15/2020    HX ORTHOPAEDIC      wrist surgery     HX TONSILLECTOMY       Patient Active Problem List   Diagnosis Code    Seizure disorder ([facility] G40.909    Multiple sclerosis ([address] G35    Episode of unresponsiveness R41.89    Staring spell [id number]    Chronic left lumbar radiculopathy M54.16    Left arm pain M79.602    Type 2 diabetes mellitus with hyperglycemia, without long-term current use of insulin (HCC) E11.65    Essential hypertension I10    Arthritis M19.90    Cirrhosis of liver (HCC) K74.60    Obstructive sleep apnea syndrome G47.33    Iatrogenic hyperthyroidism E05.80       Pertinent Medications/Supplements:  Prior to Admission medications    Medication Sig Start Date End Date Taking? Authorizing Provider   calcium carb/vit D3/minerals (CALCIUM-VITAMIN D PO) Take 2 Tablets by mouth daily. Provider, Historical   lactase (Lactaid) 3,000 unit tablet Take  by mouth as needed. Provider, Historical   Janumet XR 50-1,000 mg TM24 TAKE 1 TABLET BY MOUTH TWICE DAILY WITH MEALS 2/24/22   Dany Cloud MD   levothyroxine (SYNTHROID) 25 mcg tablet Take 1 Tablet by mouth Daily (before breakfast).  1/5/22   Dany Cloud MD glucose blood VI test strips (blood glucose test) strip Use to check BG twice daily. E11.65 9/24/21   Wily Mabry MD   cloNIDine HCL (CATAPRES) 0.1 mg tablet Take 0.1 mg by mouth daily. If BP over 130    Provider, Historical   baclofen (LIORESAL) 10 mg tablet Take  by mouth. 1/2 tab PRN    Provider, Historical   colestipoL (COLESTID) 1 gram tablet Take 1 g by mouth three (3) times daily. Provider, Historical   lipase-protease-amylase (CREON 24,000) 24,000-76,000 -120,000 unit capsule Take 2 Caps by mouth three (3) times daily (with meals). Provider, Historical   nadoloL (CORGARD) 20 mg tablet Take 40 mg by mouth two (2) times a day. Provider, Historical   albuterol (PROVENTIL HFA, VENTOLIN HFA, PROAIR HFA) 90 mcg/actuation inhaler  4/25/20   Provider, Historical   hydroCHLOROthiazide (MICROZIDE) 12.5 mg capsule Take 1 Cap by mouth daily. 5/13/20   Provider, Historical   lisinopriL (PRINIVIL, ZESTRIL) 40 mg tablet Take 1 Tab by mouth daily. 5/13/20   Provider, Historical   montelukast (SINGULAIR) 10 mg tablet Take 1 Tab by mouth daily. 5/13/20   Provider, Historical   omeprazole (PRILOSEC) 20 mg capsule Take 1 Cap by mouth daily. 5/13/20   Provider, Historical   Tecfidera 240 mg cpDR Take 1 Tab by mouth two (2) times a day. 5/27/20   Provider, Historical   aspirin delayed-release 81 mg tablet Take 81 mg by mouth nightly. Provider, Historical   multivitamin (ONE A DAY) tablet Take 1 Tab by mouth daily. Provider, Historical   simethicone (GAS RELIEF) 80 mg chewable tablet Take 80 mg by mouth every six (6) hours as needed for Flatulence.     Provider, Historical   loperamide (IMODIUM) 2 mg capsule TK ONE C PO  BID PRN 12/26/16   Provider, Historical       Vitamin/mineral supplements: Vitamin D    Pertinent Biochemical Data:  Lab Results   Component Value Date/Time    Sodium 142 04/27/2022 07:53 AM    Potassium 4.4 04/27/2022 07:53 AM    Chloride 105 04/27/2022 07:53 AM    CO2 23 04/27/2022 07:53 AM    Anion gap 5 11/01/2021 12:51 PM    Glucose 102 (H) 04/27/2022 07:53 AM    BUN 28 (H) 04/27/2022 07:53 AM    Creatinine 0.92 04/27/2022 07:53 AM    BUN/Creatinine ratio 30 (H) 04/27/2022 07:53 AM    GFR est AA >60 11/01/2021 12:51 PM    GFR est non-AA 54 (L) 11/01/2021 12:51 PM    Calcium 10.7 (H) 04/27/2022 07:53 AM    Bilirubin, total 0.9 11/01/2021 01:30 PM    Alk.  phosphatase 66 11/01/2021 01:30 PM    Protein, total 7.3 11/01/2021 01:30 PM    Albumin 3.5 11/01/2021 01:30 PM    Globulin 3.8 11/01/2021 01:30 PM    A-G Ratio 0.9 (L) 11/01/2021 01:30 PM    ALT (SGPT) 27 11/01/2021 01:30 PM    AST (SGOT) 27 11/01/2021 01:30 PM      Lab Results   Component Value Date/Time    Sodium 142 04/27/2022 07:53 AM    Potassium 4.4 04/27/2022 07:53 AM    Chloride 105 04/27/2022 07:53 AM    CO2 23 04/27/2022 07:53 AM    Anion gap 5 11/01/2021 12:51 PM    Glucose 102 (H) 04/27/2022 07:53 AM    BUN 28 (H) 04/27/2022 07:53 AM    Creatinine 0.92 04/27/2022 07:53 AM    BUN/Creatinine ratio 30 (H) 04/27/2022 07:53 AM    GFR est AA >60 11/01/2021 12:51 PM    GFR est non-AA 54 (L) 11/01/2021 12:51 PM    Calcium 10.7 (H) 04/27/2022 07:53 AM      Lab Results   Component Value Date/Time    LDL,Direct 81 04/27/2022 07:53 AM     Lab Results   Component Value Date/Time    Hemoglobin A1c 5.7 (H) 04/27/2022 07:53 AM    Hemoglobin A1c (POC) 5.3 01/05/2022 01:45 PM    Hemoglobin A1c, External 6.3 04/29/2015 10:39 AM     Lab Results   Component Value Date/Time    Microalbumin/Creat ratio (mg/g creat) 10 01/23/2020 10:02 AM    Microalb/Creat ratio (ug/mg creat.) 38 (H) 04/27/2022 07:53 AM    Microalbumin,urine random 0.88 01/23/2020 10:02 AM     BP Readings from Last 2 Encounters:   06/06/22 (!) 111/47   01/05/22 (!) 141/54        Anthropometrics:  Height:   Ht Readings from Last 1 Encounters:   06/06/22 5' 2\" (1.575 m)     Weight:   Wt Readings from Last 1 Encounters:   06/06/22 130 lb (59 kg)     IBW: ~49.5 kg (119%IBW)     UBW: 56-59 kg BMI: 23.7 kg/m2     BMI indicative of: Normal weight    Weight hx: Wt Readings from Last 3 Encounters:   06/06/22 130 lb (59 kg)   02/25/22 130 lb (59 kg)   01/05/22 136 lb (61.7 kg)        Food- and nutrition-related history:  Pt's perceptions, values, motivation, barriers r/t nutrition problem: highly motivated to identify source of gastrointestinal distress  Prior education with RDN: years ago; she could not recall  Prior DSMES: none  Current or previous diets: takes Lactaid when consuming milk or milk products  Food allergies: gluten and milk  Participant denied Gluten allergy and eats bread daily; denies symptoms of nausea or emesis  Eating environment/psychosocial/support: secure  Knowledge/beliefs/attitudes about food/nutrition/health: eats three meals per day; consumes a lot of canned meats  Food security: unsure  Cultural/Faith influences: none  Gastrointestinal: diarrhea daily; usually 1-2 hours after eating      24-hour recall:  Breakfast (9 AM): eggs, toast  Lunch (12 PM): 1/2 bologna sandwich on wheat bread c mars and mustard, and potato chips  Dinner (5 PM): Canned ham sandwich on wheat bread, potato chips  Snacks: denied  Beverages: water with sugar free flavor packets    Estimated calorie intake per recall: 875 kcal (Inadequate) ~61% estimated energy needs  Estimated protein intake per recall: 35 gm protein (Inadequate) ~75% estimated protein needs    Activity level: Light activity    Estimated Nutrition Needs:  Calorie needs (using 25 Kcal/Kg): 1475 kcal/day  Protein needs (using 0.7-0.8 g Pro/Kg 2/2 Cirrhosis ): 41.3-47.2  gm protein/day    Nutrition Diagnosis: (6/23/2022): Altered gastrointestinal function related to compromised pancreatic function as evidenced by abnormal digestive enzymes and h/o inflammatory autoimmune disorder: Hashitoxicosis. Nutrition Interventions:  1. Take Creon with first bite of food  2. Use Imodium prior to s/sx of diarrhea (preventive vs reactive)  3.  Given list for lactose controlled dietary intake  4. Encouraged to avoid canned meats (sodium content too high and possible additives containing lactose)  5. Discouraged sugar free products containing alcohol sugars    Patient Goals: Track meals/symptoms of GI distress for one week    Resources Provided/Reviewed: Lactose Controlled Recommended Intakes, Dumping Syndrome  Information Reviewed with: Patient Pt was receptive and exhibited understanding of the material presented    Nutrition Monitoring/Evaluation:  (6/23/2022): She exhibits s/sx of dumping syndrome. She is taking the pancreatic enzymes after she eats. She was encouraged to take with her first bite of food. Will assess food/symptom log upon return      MNT Follow-up Visit: July 1, 2022    --Puja Diaz RD on 6/23/2022 at 10:49 AM    An electronic signature was used to authenticate this note.  I was in the office for the appointment on 6/23/2022  and time spent: 60 minutes

## 2022-06-25 DIAGNOSIS — E11.65 TYPE 2 DIABETES MELLITUS WITH HYPERGLYCEMIA, WITHOUT LONG-TERM CURRENT USE OF INSULIN (HCC): Primary | ICD-10-CM

## 2022-06-30 DIAGNOSIS — E11.65 TYPE 2 DIABETES MELLITUS WITH HYPERGLYCEMIA, WITHOUT LONG-TERM CURRENT USE OF INSULIN (HCC): Primary | ICD-10-CM

## 2022-07-01 ENCOUNTER — CLINICAL SUPPORT (OUTPATIENT)
Dept: DIABETES SERVICES | Age: 72
End: 2022-07-01
Payer: MEDICARE

## 2022-07-01 VITALS — BODY MASS INDEX: 23.7 KG/M2 | WEIGHT: 129.6 LBS

## 2022-07-01 DIAGNOSIS — E11.65 TYPE 2 DIABETES MELLITUS WITH HYPERGLYCEMIA, WITHOUT LONG-TERM CURRENT USE OF INSULIN (HCC): Primary | ICD-10-CM

## 2022-07-01 PROCEDURE — 97803 MED NUTRITION INDIV SUBSEQ: CPT | Performed by: DIETITIAN, REGISTERED

## 2022-07-01 NOTE — PROGRESS NOTES
[facility] for Diabetes Health  Diabetes Self-Management Education & Support Program  Follow-up Encounter note      Patient's Name: Isabella Grace  Date: 7/1/2022  Reason for Referral: Medical Nutrition Therapy  Referral Source: Blake Gunter MD    Nutrition Assessment  She is monitoring her BP and reports that it has been below 140/61. Her BG levels Evening: und 130 mg/dL. Mornings above: 144 mg/dL    She changed the timing of Creon; Consistently taking one imodium and two lactaid prior to every meal; She is avoiding all milk products. She denies any reports of diarrhea since last visit. She has been taking the creon prior to her meals. She is pacing her own snacks and meals to events. She logged her meals since last visit and she is reading food labels to avoid hidden forms of lactose. Anthropometrics:  Height:       Ht Readings from Last 1 Encounters:   06/06/22 5' 2\" (1.575 m)      Weight:       Wt Readings from Last 1 Encounters:   06/06/22 130 lb (59 kg)      IBW: ~49.5 kg (119%IBW)     UBW: 56-59 kg      BMI: 23.7 kg/m2     BMI indicative of: Normal weight     Weight hx:        Wt Readings from Last 3 Encounters:   06/06/22 130 lb (59 kg)   02/25/22 130 lb (59 kg)   01/05/22 136 lb (61.7 kg)         Food- and nutrition-related history:  Pt's perceptions, values, motivation, barriers r/t nutrition problem: highly motivated to identify source of gastrointestinal distress  Prior education with RDN: years ago; she could not recall  Prior DSMES: none  Current or previous diets: takes Lactaid when consuming milk or milk products  Food allergies: gluten and milk  Participant denied Gluten allergy and eats bread daily; denies symptoms of nausea or emesis  Eating environment/psychosocial/support: secure  Knowledge/beliefs/attitudes about food/nutrition/health: eats three meals per day; consumes a lot of canned meats  Food security: unsure  Cultural/Yazidi influences: none  Gastrointestinal: diarrhea daily; usually 1-2 hours after eating        24-hour recall:  Breakfast (9 AM): eggs, toast  Lunch (12 PM): 1/2 bologna sandwich on wheat bread c mars and mustard, and potato chips  Dinner (5 PM): Canned ham sandwich on wheat bread, potato chips  Snacks: denied  Beverages: water with sugar free flavor packets     Estimated calorie intake per recall: 875 kcal (Inadequate) ~61% estimated energy needs  Estimated protein intake per recall: 35 gm protein (Inadequate) ~75% estimated protein needs     Activity level: Light activity     Estimated Nutrition Needs:  Calorie needs (using 25 Kcal/Kg): 1475 kcal/day  Protein needs (using 0.7-0.8 g Pro/Kg 2/2 Cirrhosis ): 41.3-47.2  gm protein/day     Nutrition Diagnosis: (6/23/2022): Altered gastrointestinal function related to compromised pancreatic function as evidenced by abnormal digestive enzymes and h/o inflammatory autoimmune disorder: Hashitoxicosis.      Nutrition Interventions:  1. Take Creon with first bite of food  2. Use Imodium prior to s/sx of diarrhea (preventive vs reactive)  3. Given list for lactose controlled dietary intake  4. Encouraged to avoid canned meats (sodium content too high and possible additives containing lactose)  5. Discouraged sugar free products containing alcohol sugars     Patient Goals: Track meals/symptoms of GI distress for one week     Resources Provided/Reviewed: Lactose Controlled Recommended Intakes, Dumping Syndrome  Information Reviewed with: Patient Pt was receptive and exhibited understanding of the material presented     Nutrition Monitoring/Evaluation:  (6/23/2022): She exhibits s/sx of dumping syndrome. She is taking the pancreatic enzymes after she eats. She was encouraged to take with her first bite of food. Will assess food/symptom log upon return    (7/1/2022): Ms. Rosa Maria Portillo is taking pancreatic enzymes as prescribed in order to achieve full benefit.  She denies GI distress (eg: pain and diarrhea) since making this change. She is taking the Imodium consistently in order to prevent onset of diarrhea and she has been reading food labels as to avoid lactose. She confirms that she is consuming gluten free products. She is keeping a food log and was encouraged to log BG levels prior to meals and given target range:  mg/dL and 2H post prandial c BG target less than 180 mg/dL. --Giselle Cruz RD on 7/1/2022 at 3:06 PM    An electronic signature was used to authenticate this note.  I was in the office for the appointment on 7/1/2022  and time spent: 30 minutes

## 2022-09-11 DIAGNOSIS — E11.65 TYPE 2 DIABETES MELLITUS WITH HYPERGLYCEMIA, WITHOUT LONG-TERM CURRENT USE OF INSULIN (HCC): ICD-10-CM

## 2022-09-12 RX ORDER — BLOOD SUGAR DIAGNOSTIC
STRIP MISCELLANEOUS
Qty: 200 STRIP | Refills: 3 | Status: SHIPPED | OUTPATIENT
Start: 2022-09-12

## 2022-10-04 LAB
25(OH)D3+25(OH)D2 SERPL-MCNC: 64.7 NG/ML (ref 30–100)
BUN SERPL-MCNC: 28 MG/DL (ref 8–27)
BUN/CREAT SERPL: 31 (ref 12–28)
CALCIUM SERPL-MCNC: 10.2 MG/DL (ref 8.7–10.3)
CHLORIDE SERPL-SCNC: 105 MMOL/L (ref 96–106)
CO2 SERPL-SCNC: 24 MMOL/L (ref 20–29)
CREAT SERPL-MCNC: 0.91 MG/DL (ref 0.57–1)
EGFR: 68 ML/MIN/1.73
EST. AVERAGE GLUCOSE BLD GHB EST-MCNC: 111 MG/DL
GLUCOSE SERPL-MCNC: 109 MG/DL (ref 70–99)
HBA1C MFR BLD: 5.5 % (ref 4.8–5.6)
POTASSIUM SERPL-SCNC: 4.1 MMOL/L (ref 3.5–5.2)
PTH-INTACT SERPL-MCNC: 47 PG/ML (ref 15–65)
SODIUM SERPL-SCNC: 145 MMOL/L (ref 134–144)

## 2022-11-02 NOTE — PROGRESS NOTES
HISTORY OF PRESENT ILLNESS    Samara Barbosa is a 81 y.o. female is a new patient is here for urinary incontinence  She has a history of T2DM, HTN  She reports bladder prolapse for a few years  She reports urinary frequency almost every 30-40 mins, and 2-3 times at night. She restricts fluid during the day time  She has a history of UTI, but has no symptoms. Reports getting them every couple of months. She wears pads, and changes them about 2 times at night. Urine culture  Escherichia coli   She has stress and urge incontinence. She had hysterectomy  No history of breast cancer  She reports using  med for urinary incontinence. She has diarrhea, takes imodium. Past Medical History:  PMHx (including negatives):  has a past medical history of Acid reflux, Arthritis, Asthma, Back pain, Burning with urination, Chronic obstructive pulmonary disease ([address] Cirrhosis of liver ([address] Diabetes ([address] Headache, Hepatitis C, Hiatal hernia, Hypertension, Multiple sclerosis ([address] Seizures ([address] and Sleep apnea. PSurgHx:  has a past surgical history that includes hx cholecystectomy; hx hysterectomy; hx tonsillectomy; hx lipectomy (05/15/2020); hx orthopaedic; and hx hysterectomy. PSocHx:  reports that she has never smoked. She has never used smokeless tobacco. She reports current alcohol use. She reports that she does not currently use drugs. Home Medications    Medication Sig Start Date End Date Taking? Authorizing Provider   Ferrex 150 150 mg iron capsule Take 150 mg by mouth two (2) times a day. 9/21/22  Yes Provider, Historical   estradioL (ESTRACE) 0.01 % (0.1 mg/gram) vaginal cream Insert 1 g into vagina every Monday, Wednesday, Friday. 11/4/22  Yes Pam Colunga MD   oxybutynin chloride XL (DITROPAN XL) 10 mg CR tablet Take 1 Tablet by mouth daily. 11/3/22  Yes Pam Colunga MD   calcium carb/vit D3/minerals (CALCIUM-VITAMIN D PO) Take 2 Tablets by mouth daily.    Yes Provider, Historical   lactase (LACTAID) 3,000 unit tablet Take  by mouth as needed. Yes Provider, Historical   Janumet XR 50-1,000 mg TM24 TAKE 1 TABLET BY MOUTH TWICE DAILY WITH MEALS 2/24/22  Yes Tracy Gary MD   levothyroxine (SYNTHROID) 25 mcg tablet Take 1 Tablet by mouth Daily (before breakfast). 1/5/22  Yes Tracy Gary MD   cloNIDine HCL (CATAPRES) 0.1 mg tablet Take 0.1 mg by mouth daily. If BP over 130   Yes Provider, Historical   colestipoL (COLESTID) 1 gram tablet Take 1 g by mouth three (3) times daily. Yes Provider, Historical   lipase-protease-amylase (CREON 24,000) 24,000-76,000 -120,000 unit capsule Take 2 Caps by mouth three (3) times daily (with meals). Yes Provider, Historical   nadoloL (CORGARD) 20 mg tablet Take 40 mg by mouth two (2) times a day. Yes Provider, Historical   albuterol (PROVENTIL HFA, VENTOLIN HFA, PROAIR HFA) 90 mcg/actuation inhaler  4/25/20  Yes Provider, Historical   lisinopriL (PRINIVIL, ZESTRIL) 40 mg tablet Take 1 Tab by mouth daily. 5/13/20  Yes Provider, Historical   montelukast (SINGULAIR) 10 mg tablet Take 1 Tab by mouth daily. 5/13/20  Yes Provider, Historical   omeprazole (PRILOSEC) 20 mg capsule Take 1 Cap by mouth daily. 5/13/20  Yes Provider, Historical   Tecfidera 240 mg cpDR Take 1 Tab by mouth two (2) times a day. 5/27/20  Yes Provider, Historical   aspirin delayed-release 81 mg tablet Take 81 mg by mouth nightly. Yes Provider, Historical   multivitamin (ONE A DAY) tablet Take 1 Tab by mouth daily. Yes Provider, Historical   simethicone (MYLICON) 80 mg chewable tablet Take 80 mg by mouth every six (6) hours as needed for Flatulence. Yes Provider, Historical   loperamide (IMODIUM) 2 mg capsule TK ONE C PO  BID PRN 12/26/16  Yes Provider, Historical   glucose blood VI test strips (Accu-Chek Luanne Plus test strp) strip TEST BLOOD GLUCOSE TWICE DAILY 9/12/22   Tracy Gary MD   baclofen (LIORESAL) 10 mg tablet Take  by mouth.  1/2 tab PRN  Patient not taking: Reported on 11/3/2022    Provider, Historical   hydroCHLOROthiazide (MICROZIDE) 12.5 mg capsule Take 1 Cap by mouth daily. 5/13/20   Provider, Historical        Chronic Conditions Addressed Today       1. Urinary incontinence - Primary     Relevant Medications     Ferrex 150 150 mg iron capsule     estradioL (ESTRACE) 0.01 % (0.1 mg/gram) vaginal cream (Start on 11/4/2022)     oxybutynin chloride XL (DITROPAN XL) 10 mg CR tablet     Other Relevant Orders     AMB POC URINALYSIS DIP STICK AUTO W/O MICRO (Completed)     AMB POC PVR, MIKHAIL,POST-VOID RES,US,NON-IMAGING     CULTURE, URINE    2. Atrophic vaginitis     Relevant Medications     estradioL (ESTRACE) 0.01 % (0.1 mg/gram) vaginal cream (Start on 11/4/2022)    3. Rectocele    4. Cystocele, unspecified (CODE)     Relevant Medications     Ferrex 150 150 mg iron capsule    5. Recurrent UTI     Relevant Medications     Ferrex 150 150 mg iron capsule     Other Relevant Orders     CT ABD PELV WO CONT       Review of Systems   Constitutional:  Positive for malaise/fatigue. HENT: Negative. Eyes: Negative. Respiratory: Negative. Cardiovascular: Negative. Gastrointestinal:  Positive for diarrhea and heartburn. Genitourinary:  Positive for dysuria, frequency and urgency. Incontinent. Musculoskeletal:  Positive for back pain. Skin: Negative. Neurological:  Positive for headaches. Ms   Endo/Heme/Allergies: Negative. Patient denies the symptoms of COVID-19 per routine screening guidelines. Physical Exam  Constitutional:       Appearance: Normal appearance. HENT:      Head: Normocephalic. Nose: Nose normal.      Mouth/Throat:      Mouth: Mucous membranes are moist.   Eyes:      Pupils: Pupils are equal, round, and reactive to light. Cardiovascular:      Rate and Rhythm: Normal rate. Pulses: Normal pulses. Pulmonary:      Effort: Pulmonary effort is normal.   Abdominal:      General: Abdomen is flat.       Palpations: Abdomen is soft. Genitourinary:     Comments: deferred  Musculoskeletal:         General: Normal range of motion. Cervical back: Normal range of motion. Skin:     General: Skin is warm. Neurological:      General: No focal deficit present. Mental Status: She is alert and oriented to person, place, and time. Psychiatric:         Mood and Affect: Mood normal.         Behavior: Behavior normal.         Thought Content: Thought content normal.         Judgment: Judgment normal.       ASSESSMENT and PLAN  Diagnoses and all orders for this visit:    1. Urinary incontinence, unspecified type  -     AMB POC URINALYSIS DIP STICK AUTO W/O MICRO  -     AMB POC PVR, MIKHAIL,POST-VOID RES,US,NON-IMAGING  -     CULTURE, URINE  -     estradioL (ESTRACE) 0.01 % (0.1 mg/gram) vaginal cream; Insert 1 g into vagina every Monday, Wednesday, Friday. -     oxybutynin chloride XL (DITROPAN XL) 10 mg CR tablet; Take 1 Tablet by mouth daily. 2. Atrophic vaginitis    3. Cystocele, unspecified (CODE)    4. Rectocele    5. Recurrent UTI  -     CT ABD PELV WO CONT; Future    6. Urinary tract infection without hematuria, site unspecified               Danelle White may have a reminder for a \"due or due soon\" health maintenance. The patient has been encouraged to contact their primary care provider for follow-up on this health maintenance or other necessary and/or routine health screening.      Cristela Witt MD

## 2022-11-03 ENCOUNTER — OFFICE VISIT (OUTPATIENT)
Dept: UROLOGY | Age: 72
End: 2022-11-03
Payer: MEDICARE

## 2022-11-03 VITALS
DIASTOLIC BLOOD PRESSURE: 69 MMHG | BODY MASS INDEX: 23.55 KG/M2 | TEMPERATURE: 98 F | HEART RATE: 70 BPM | SYSTOLIC BLOOD PRESSURE: 122 MMHG | RESPIRATION RATE: 16 BRPM | WEIGHT: 128 LBS | OXYGEN SATURATION: 98 % | HEIGHT: 62 IN

## 2022-11-03 DIAGNOSIS — N95.2 ATROPHIC VAGINITIS: ICD-10-CM

## 2022-11-03 DIAGNOSIS — N81.10 CYSTOCELE, UNSPECIFIED (CODE): ICD-10-CM

## 2022-11-03 DIAGNOSIS — R32 URINARY INCONTINENCE, UNSPECIFIED TYPE: Primary | ICD-10-CM

## 2022-11-03 DIAGNOSIS — N81.6 RECTOCELE: ICD-10-CM

## 2022-11-03 DIAGNOSIS — N39.0 URINARY TRACT INFECTION WITHOUT HEMATURIA, SITE UNSPECIFIED: ICD-10-CM

## 2022-11-03 DIAGNOSIS — N39.0 RECURRENT UTI: ICD-10-CM

## 2022-11-03 LAB
BILIRUB UR QL: NEGATIVE
GLUCOSE UR-MCNC: NEGATIVE MG/DL
KETONES P FAST UR STRIP-MCNC: NEGATIVE MG/DL
PH UR STRIP: 5.5 [PH] (ref 4.6–8)
PROT UR QL STRIP: NEGATIVE
PVR POC: NORMAL CC
SP GR UR STRIP: 1.01 (ref 1–1.03)
UA UROBILINOGEN AMB POC: NORMAL (ref 0.2–1)
URINALYSIS CLARITY POC: CLEAR
URINALYSIS COLOR POC: NORMAL
URINE BLOOD POC: NEGATIVE
URINE LEUKOCYTES POC: NORMAL
URINE NITRITES POC: POSITIVE

## 2022-11-03 PROCEDURE — 3017F COLORECTAL CA SCREEN DOC REV: CPT | Performed by: UROLOGY

## 2022-11-03 PROCEDURE — 1101F PT FALLS ASSESS-DOCD LE1/YR: CPT | Performed by: UROLOGY

## 2022-11-03 PROCEDURE — 3074F SYST BP LT 130 MM HG: CPT | Performed by: UROLOGY

## 2022-11-03 PROCEDURE — 3078F DIAST BP <80 MM HG: CPT | Performed by: UROLOGY

## 2022-11-03 PROCEDURE — G8754 DIAS BP LESS 90: HCPCS | Performed by: UROLOGY

## 2022-11-03 PROCEDURE — G8400 PT W/DXA NO RESULTS DOC: HCPCS | Performed by: UROLOGY

## 2022-11-03 PROCEDURE — 51798 US URINE CAPACITY MEASURE: CPT | Performed by: UROLOGY

## 2022-11-03 PROCEDURE — 1090F PRES/ABSN URINE INCON ASSESS: CPT | Performed by: UROLOGY

## 2022-11-03 PROCEDURE — 99204 OFFICE O/P NEW MOD 45 MIN: CPT | Performed by: UROLOGY

## 2022-11-03 PROCEDURE — 81003 URINALYSIS AUTO W/O SCOPE: CPT | Performed by: UROLOGY

## 2022-11-03 PROCEDURE — G8536 NO DOC ELDER MAL SCRN: HCPCS | Performed by: UROLOGY

## 2022-11-03 PROCEDURE — 1123F ACP DISCUSS/DSCN MKR DOCD: CPT | Performed by: UROLOGY

## 2022-11-03 PROCEDURE — G8510 SCR DEP NEG, NO PLAN REQD: HCPCS | Performed by: UROLOGY

## 2022-11-03 PROCEDURE — G8420 CALC BMI NORM PARAMETERS: HCPCS | Performed by: UROLOGY

## 2022-11-03 PROCEDURE — G8427 DOCREV CUR MEDS BY ELIG CLIN: HCPCS | Performed by: UROLOGY

## 2022-11-03 PROCEDURE — G8752 SYS BP LESS 140: HCPCS | Performed by: UROLOGY

## 2022-11-03 RX ORDER — IRON POLYSACCHARIDE COMPLEX 150 MG
150 CAPSULE ORAL 2 TIMES DAILY
COMMUNITY
Start: 2022-09-21

## 2022-11-03 RX ORDER — OXYBUTYNIN CHLORIDE 10 MG/1
10 TABLET, EXTENDED RELEASE ORAL DAILY
Qty: 30 TABLET | Refills: 5 | Status: SHIPPED | OUTPATIENT
Start: 2022-11-03

## 2022-11-03 RX ORDER — ESTRADIOL 0.1 MG/G
1 CREAM VAGINAL
Qty: 42.5 G | Refills: 5 | Status: SHIPPED | OUTPATIENT
Start: 2022-11-04

## 2022-11-03 NOTE — PROGRESS NOTES
Chief Complaint   Patient presents with    New Patient    Enuresis     Referral by Bj Boykin, [facility] [employer] 9/28/22       PHQ-9 score is    Negative    Vitals:    11/03/22 1424   BP: 122/69   Pulse: 70   Resp: 16   Temp: 98 °F (36.7 °C)   TempSrc: Temporal   SpO2: 98%   Weight: 128 lb (58.1 kg)   Height: 5' 2\" (1.575 m)        1. \"Have you been to the ER, urgent care clinic since your last visit? Hospitalized since your last visit? \" No    2. \"Have you seen or consulted any other health care providers outside of the [address] [place] [address] since your last visit? \" No     3. For patients aged 39-70: Has the patient had a colonoscopy / FIT/ Cologuard? Yes - no Care Gap present      If the patient is female:    4. For patients aged 41-77: Has the patient had a mammogram within the past 2 years? NA - based on age or sex      11. For patients aged 21-65: Has the patient had a pap smear?  NA - based on age or sex

## 2022-11-08 ENCOUNTER — TELEPHONE (OUTPATIENT)
Dept: UROLOGY | Age: 72
End: 2022-11-08

## 2022-11-08 NOTE — TELEPHONE ENCOUNTER
Patient called stating she is having a pain around her belly button area, and she doesn't know if the medication Dr. Ortega Dominguez prescribed her was for her UTI from the last office visit.  Patient wants to know what to do    Please advise

## 2022-11-08 NOTE — TELEPHONE ENCOUNTER
Called the patient advised her to Stop oxybutynin for couple of days and see if abdominal still persist. She denies constipation, reports mild nausea. She is thinking that might have UTI infection. She will drop off urine tomorrow.  She was advised to sick medical attention if abdominal pain persist.

## 2022-11-09 ENCOUNTER — CLINICAL SUPPORT (OUTPATIENT)
Dept: UROLOGY | Age: 72
End: 2022-11-09
Payer: MEDICARE

## 2022-11-09 DIAGNOSIS — R32 URINARY INCONTINENCE, UNSPECIFIED TYPE: Primary | ICD-10-CM

## 2022-11-09 DIAGNOSIS — N39.0 RECURRENT UTI: ICD-10-CM

## 2022-11-09 LAB
BACTERIA UR CULT: ABNORMAL
BILIRUB UR QL: NEGATIVE
GLUCOSE UR-MCNC: NEGATIVE MG/DL
KETONES P FAST UR STRIP-MCNC: NEGATIVE MG/DL
PH UR STRIP: 5.5 [PH] (ref 4.6–8)
PROT UR QL STRIP: NEGATIVE
SP GR UR STRIP: 1.02 (ref 1–1.03)
UA UROBILINOGEN AMB POC: NORMAL (ref 0.2–1)
URINALYSIS CLARITY POC: CLEAR
URINALYSIS COLOR POC: YELLOW
URINE BLOOD POC: NEGATIVE
URINE LEUKOCYTES POC: NORMAL
URINE NITRITES POC: NEGATIVE

## 2022-11-09 PROCEDURE — 81003 URINALYSIS AUTO W/O SCOPE: CPT | Performed by: UROLOGY

## 2022-11-10 ENCOUNTER — HOSPITAL ENCOUNTER (OUTPATIENT)
Dept: CT IMAGING | Age: 72
Discharge: HOME | End: 2022-11-10
Attending: UROLOGY
Payer: MEDICARE

## 2022-11-10 DIAGNOSIS — N39.0 RECURRENT UTI: ICD-10-CM

## 2022-11-10 PROCEDURE — 74176 CT ABD & PELVIS W/O CONTRAST: CPT

## 2022-11-10 RX ORDER — CEPHALEXIN 500 MG/1
500 CAPSULE ORAL 3 TIMES DAILY
Qty: 21 CAPSULE | Refills: 0 | Status: SHIPPED | OUTPATIENT
Start: 2022-11-10 | End: 2022-11-17

## 2022-11-16 LAB
BACTERIA UR CULT: ABNORMAL
BACTERIA UR CULT: ABNORMAL

## 2022-11-28 ENCOUNTER — TELEPHONE (OUTPATIENT)
Dept: UROLOGY | Age: 72
End: 2022-11-28

## 2022-11-30 ENCOUNTER — OFFICE VISIT (OUTPATIENT)
Dept: UROLOGY | Age: 72
End: 2022-11-30
Payer: MEDICARE

## 2022-11-30 DIAGNOSIS — N95.2 ATROPHIC VAGINITIS: ICD-10-CM

## 2022-11-30 DIAGNOSIS — N30.90 CYSTITIS: ICD-10-CM

## 2022-11-30 DIAGNOSIS — R32 URINARY INCONTINENCE, UNSPECIFIED TYPE: ICD-10-CM

## 2022-11-30 DIAGNOSIS — N81.6 RECTOCELE: Primary | ICD-10-CM

## 2022-11-30 LAB
BILIRUB UR QL: NEGATIVE
GLUCOSE UR-MCNC: NEGATIVE MG/DL
KETONES P FAST UR STRIP-MCNC: NEGATIVE MG/DL
PH UR STRIP: 5.5 [PH] (ref 4.6–8)
PROT UR QL STRIP: NEGATIVE
SP GR UR STRIP: 1.02 (ref 1–1.03)
UA UROBILINOGEN AMB POC: NORMAL (ref 0.2–1)
URINALYSIS CLARITY POC: CLEAR
URINALYSIS COLOR POC: YELLOW
URINE BLOOD POC: NEGATIVE
URINE LEUKOCYTES POC: NEGATIVE
URINE NITRITES POC: POSITIVE

## 2022-11-30 RX ORDER — CIPROFLOXACIN 250 MG/1
250 TABLET, FILM COATED ORAL 2 TIMES DAILY
Qty: 20 TABLET | Refills: 0 | Status: SHIPPED | OUTPATIENT
Start: 2022-11-30 | End: 2022-12-10

## 2022-11-30 RX ORDER — CIPROFLOXACIN 500 MG/1
500 TABLET ORAL ONCE
Status: COMPLETED | OUTPATIENT
Start: 2022-11-30 | End: 2022-11-30

## 2022-11-30 RX ADMIN — CIPROFLOXACIN 500 MG: 500 TABLET ORAL at 15:00

## 2022-11-30 NOTE — PROGRESS NOTES
Chief Complaint   Patient presents with    Cystoscopy    Enuresis    Vaginitis    Recurrent UTI       PHQ-9 score is    Negative    There were no vitals filed for this visit. 1. \"Have you been to the ER, urgent care clinic since your last visit? Hospitalized since your last visit? \" No    2. \"Have you seen or consulted any other health care providers outside of the [address] [place] [address] since your last visit? \" No     3. For patients aged 39-70: Has the patient had a colonoscopy / FIT/ Cologuard? No      If the patient is female:    4. For patients aged 41-77: Has the patient had a mammogram within the past 2 years? No      5. For patients aged 21-65: Has the patient had a pap smear?  No

## 2022-11-30 NOTE — PROGRESS NOTES
HISTORY OF PRESENT ILLNESS    Valentín Barclay is a 81 y.o. female is here for cystoscopy and CMG due to urinary incontinence. She has developed signs and symptoms of UTI since her scheduling. She has urgency frequency burning and discomfort. Her urine is positive for nitrates. She is not a candidate for cystoscopy and CMG till infections are cleared up. We will send urine for culture today placed her on Cipro see her back next 2 weeks and set her up for cystoscopy and CMG providing the cultures show that the infection is sensitive to the Cipro. Of fevers and chills nausea vomiting or loss of mentation         Past Medical History:  PMHx (including negatives):  has a past medical history of Acid reflux, Arthritis, Asthma, Back pain, Burning with urination, Chronic obstructive pulmonary disease ([address] Cirrhosis of liver ([address] Diabetes ([address] Headache, Hepatitis C, Hiatal hernia, Hypertension, Multiple sclerosis ([address] Seizures ([address] and Sleep apnea. PSurgHx:  has a past surgical history that includes hx cholecystectomy; hx hysterectomy; hx tonsillectomy; hx lipectomy (05/15/2020); hx orthopaedic; hx hysterectomy; and hx urological (11/30/2022). PSocHx:  reports that she has never smoked. She has never used smokeless tobacco. She reports current alcohol use. She reports that she does not currently use drugs. Home Medications    Medication Sig Start Date End Date Taking? Authorizing Provider   Ferrex 150 150 mg iron capsule Take 150 mg by mouth two (2) times a day. 9/21/22  Yes Provider, Historical   estradioL (ESTRACE) 0.01 % (0.1 mg/gram) vaginal cream Insert 1 g into vagina every Monday, Wednesday, Friday. 11/4/22  Yes Aline Brennan MD   oxybutynin chloride XL (DITROPAN XL) 10 mg CR tablet Take 1 Tablet by mouth daily.  11/3/22  Yes Aline Brennan MD   glucose blood VI test strips (Accu-Chek Luanne Plus test strp) strip TEST BLOOD GLUCOSE TWICE DAILY 9/12/22  Yes Booker Kline MD   calcium carb/vit D3/minerals (CALCIUM-VITAMIN D PO) Take 2 Tablets by mouth daily. Yes Provider, Historical   lactase (LACTAID) 3,000 unit tablet Take  by mouth as needed. Yes Provider, Historical   Janumet XR 50-1,000 mg TM24 TAKE 1 TABLET BY MOUTH TWICE DAILY WITH MEALS 2/24/22  Yes Austin Ramirez MD   levothyroxine (SYNTHROID) 25 mcg tablet Take 1 Tablet by mouth Daily (before breakfast). 1/5/22  Yes Austin Ramirez MD   cloNIDine HCL (CATAPRES) 0.1 mg tablet Take 0.1 mg by mouth daily. If BP over 130   Yes Provider, Historical   colestipoL (COLESTID) 1 gram tablet Take 1 g by mouth three (3) times daily. Yes Provider, Historical   lipase-protease-amylase (CREON 24,000) 24,000-76,000 -120,000 unit capsule Take 2 Caps by mouth three (3) times daily (with meals). Yes Provider, Historical   nadoloL (CORGARD) 20 mg tablet Take 40 mg by mouth two (2) times a day. Yes Provider, Historical   albuterol (PROVENTIL HFA, VENTOLIN HFA, PROAIR HFA) 90 mcg/actuation inhaler  4/25/20  Yes Provider, Historical   hydroCHLOROthiazide (MICROZIDE) 12.5 mg capsule Take 1 Cap by mouth daily. 5/13/20  Yes Provider, Historical   lisinopriL (PRINIVIL, ZESTRIL) 40 mg tablet Take 1 Tab by mouth daily. 5/13/20  Yes Provider, Historical   montelukast (SINGULAIR) 10 mg tablet Take 1 Tab by mouth daily. 5/13/20  Yes Provider, Historical   omeprazole (PRILOSEC) 20 mg capsule Take 1 Cap by mouth daily. 5/13/20  Yes Provider, Historical   Tecfidera 240 mg cpDR Take 1 Tab by mouth two (2) times a day. 5/27/20  Yes Provider, Historical   aspirin delayed-release 81 mg tablet Take 81 mg by mouth nightly. Yes Provider, Historical   multivitamin (ONE A DAY) tablet Take 1 Tab by mouth daily. Yes Provider, Historical   simethicone (MYLICON) 80 mg chewable tablet Take 80 mg by mouth every six (6) hours as needed for Flatulence.    Yes Provider, Historical   loperamide (IMODIUM) 2 mg capsule TK ONE C PO  BID PRN 12/26/16  Yes Provider, Historical   baclofen (LIORESAL) 10 mg tablet Take  by mouth. 1/2 tab PRN  Patient not taking: No sig reported    Provider, Historical        Chronic Conditions Addressed Today       1. Urinary incontinence     Overview      She reports bladder prolapse for a few years  She reports urinary frequency almost every 30-40 mins, and 2-3 times at night. She restricts fluid during the day time  She has a history of UTI, but has no symptoms. Reports getting them every couple of months. She wears pads, and changes them about 2 times at night. Urine culture  Escherichia coli   She has stress and urge incontinence. She had hysterectomy  No history of breast cancer  She reports using  med for urinary incontinence. She has diarrhea, takes imodium          Relevant Medications     ciprofloxacin HCl (CIPRO) tablet 500 mg (Start on 11/30/2022  3:00 PM)     Other Relevant Orders     AMB POC URINALYSIS DIP STICK AUTO W/O MICRO    2. Atrophic vaginitis     Relevant Medications     ciprofloxacin HCl (CIPRO) tablet 500 mg (Start on 11/30/2022  3:00 PM)     Other Relevant Orders     AMB POC URINALYSIS DIP STICK AUTO W/O MICRO    3. Rectocele - Primary     Acute Diagnoses Addressed Today       Cystitis            Relevant Orders        CULTURE, URINE            Review of Systems   Constitutional: Negative. HENT: Negative. Eyes: Negative. Respiratory: Negative. Cardiovascular: Negative. Gastrointestinal:  Positive for diarrhea. Genitourinary:  Positive for flank pain, frequency and urgency. Skin: Negative. Neurological: Negative. Endo/Heme/Allergies: Negative. Patient denies the symptoms of COVID-19 per routine screening guidelines. Physical Exam  Constitutional:       Appearance: Normal appearance. HENT:      Head: Normocephalic. Nose: Nose normal.      Mouth/Throat:      Mouth: Mucous membranes are moist.   Eyes:      Pupils: Pupils are equal, round, and reactive to light.    Cardiovascular: Rate and Rhythm: Normal rate. Pulses: Normal pulses. Pulmonary:      Effort: Pulmonary effort is normal.   Abdominal:      General: Abdomen is flat. Palpations: Abdomen is soft. Genitourinary:     Comments: deferred  Musculoskeletal:         General: Normal range of motion. Cervical back: Normal range of motion. Skin:     General: Skin is warm. Neurological:      General: No focal deficit present. Mental Status: She is alert and oriented to person, place, and time. Psychiatric:         Mood and Affect: Mood normal.         Behavior: Behavior normal.         Thought Content: Thought content normal.      ASSESSMENT and PLAN  Diagnoses and all orders for this visit:    1. Rectocele    2. Atrophic vaginitis  -     AMB POC URINALYSIS DIP STICK AUTO W/O MICRO  -     ciprofloxacin HCl (CIPRO) tablet 500 mg    3. Urinary incontinence, unspecified type  -     AMB POC URINALYSIS DIP STICK AUTO W/O MICRO  -     ciprofloxacin HCl (CIPRO) tablet 500 mg    4. Cystitis  -     CULTURE, URINE     1 to 2 weeks for cystoscopy CMG, keep on Cipro for 10 days. Aware of the side effects Cipro          Danelle White may have a reminder for a \"due or due soon\" health maintenance.  The patient has been encouraged to contact their primary care provider for follow-up on this health maintenance or other necessary and/or routine health s

## 2022-12-05 DIAGNOSIS — E03.9 ACQUIRED HYPOTHYROIDISM: ICD-10-CM

## 2022-12-06 RX ORDER — LEVOTHYROXINE SODIUM 25 UG/1
TABLET ORAL
Qty: 90 TABLET | Refills: 3 | Status: SHIPPED | OUTPATIENT
Start: 2022-12-06

## 2022-12-13 NOTE — PROGRESS NOTES
HISTORY OF PRESENT ILLNESS    Khanh Schwarz is a 81 y.o. female is here for urinary incontinence recurrent UTI   She was scheduled for cystoscopy 2 weeks  ago but developed UTI, cystoscopy was rescheduled. Her urine was positive for Escherichia coli, she was treated with cipro    Ct scan from 11/30/22  1. Punctate nonobstructing right renal stone. No hydronephrosis. 2. Duplicated right renal collecting system with atrophy of the upper pole. 3. Cirrhotic liver. 4. Mild retroperitoneal lymphadenopathy, nonspecific. Patient denies fevers and chills flank pain but continues with her incontinence. No dysuria no gross hematuria       Past Medical History:  PMHx (including negatives):  has a past medical history of Acid reflux, Arthritis, Asthma, Back pain, Burning with urination, Chronic obstructive pulmonary disease ([address] Cirrhosis of liver ([address] Diabetes ([address] Headache, Hepatitis C, Hiatal hernia, Hypertension, Multiple sclerosis ([address] Seizures ([address] and Sleep apnea. PSurgHx:  has a past surgical history that includes hx cholecystectomy; hx hysterectomy; hx tonsillectomy; hx lipectomy (05/15/2020); hx orthopaedic; hx hysterectomy; hx urological (11/30/2022); and hx urological (12/14/2022). PSocHx:  reports that she has never smoked. She has never used smokeless tobacco. She reports current alcohol use. She reports that she does not currently use drugs. Home Medications    Medication Sig Start Date End Date Taking? Authorizing Provider   levothyroxine (SYNTHROID) 25 mcg tablet TAKE 1 TABLET BY MOUTH DAILY BEFORE BREAKFAST 12/6/22   Margie Sandoval MD   Ferrex 150 150 mg iron capsule Take 150 mg by mouth two (2) times a day. 9/21/22   Provider, Historical   estradioL (ESTRACE) 0.01 % (0.1 mg/gram) vaginal cream Insert 1 g into vagina every Monday, Wednesday, Friday. 11/4/22   Linda Ward MD   oxybutynin chloride XL (DITROPAN XL) 10 mg CR tablet Take 1 Tablet by mouth daily.  11/3/22   Joanne Fonseca Sherine Pierre MD   glucose blood VI test strips (Accu-Chek Luanne Plus test strp) strip TEST BLOOD GLUCOSE TWICE DAILY 9/12/22   Viridiana Dominguez MD   calcium carb/vit D3/minerals (CALCIUM-VITAMIN D PO) Take 2 Tablets by mouth daily. Provider, Historical   lactase (LACTAID) 3,000 unit tablet Take  by mouth as needed. Provider, Historical   Janumet XR 50-1,000 mg TM24 TAKE 1 TABLET BY MOUTH TWICE DAILY WITH MEALS 2/24/22   Viridiana Dominguez MD   cloNIDine HCL (CATAPRES) 0.1 mg tablet Take 0.1 mg by mouth daily. If BP over 130    Provider, Historical   baclofen (LIORESAL) 10 mg tablet Take  by mouth. 1/2 tab PRN  Patient not taking: No sig reported    Provider, Historical   colestipoL (COLESTID) 1 gram tablet Take 1 g by mouth three (3) times daily. Provider, Historical   lipase-protease-amylase (CREON 24,000) 24,000-76,000 -120,000 unit capsule Take 2 Caps by mouth three (3) times daily (with meals). Provider, Historical   nadoloL (CORGARD) 20 mg tablet Take 40 mg by mouth two (2) times a day. Provider, Historical   albuterol (PROVENTIL HFA, VENTOLIN HFA, PROAIR HFA) 90 mcg/actuation inhaler  4/25/20   Provider, Historical   hydroCHLOROthiazide (MICROZIDE) 12.5 mg capsule Take 1 Cap by mouth daily. 5/13/20   Provider, Historical   lisinopriL (PRINIVIL, ZESTRIL) 40 mg tablet Take 1 Tab by mouth daily. 5/13/20   Provider, Historical   montelukast (SINGULAIR) 10 mg tablet Take 1 Tab by mouth daily. 5/13/20   Provider, Historical   omeprazole (PRILOSEC) 20 mg capsule Take 1 Cap by mouth daily. 5/13/20   Provider, Historical   Tecfidera 240 mg cpDR Take 1 Tab by mouth two (2) times a day. 5/27/20   Provider, Historical   aspirin delayed-release 81 mg tablet Take 81 mg by mouth nightly. Provider, Historical   multivitamin (ONE A DAY) tablet Take 1 Tab by mouth daily. Provider, Historical   simethicone (MYLICON) 80 mg chewable tablet Take 80 mg by mouth every six (6) hours as needed for Flatulence. Provider, Historical   loperamide (IMODIUM) 2 mg capsule TK ONE C PO  BID PRN 12/26/16   Provider, Historical        Chronic Conditions Addressed Today       1. Urinary incontinence - Primary     Overview      She reports bladder prolapse for a few years  She reports urinary frequency almost every 30-40 mins, and 2-3 times at night. She restricts fluid during the day time  She has a history of UTI, but has no symptoms. Reports getting them every couple of months. She wears pads, and changes them about 2 times at night. Urine culture  Escherichia coli   She has stress and urge incontinence. She had hysterectomy  No history of breast cancer  She reports using  med for urinary incontinence. She has diarrhea, takes imodium          Relevant Orders     AMB POC URINALYSIS DIP STICK AUTO W/O MICRO (Completed)     CULTURE, URINE (Completed)     AMB POC UROFLOWMETRY (Completed)     AMB POC PVR, MIKHAIL,POST-VOID RES,US,NON-IMAGING (Completed)    2. Atrophic vaginitis    3. Cystocele, unspecified (CODE)    4. Urinary tract infection without hematuria       Review of Systems   All other systems reviewed and are negative. Patient denies the symptoms of COVID-19 per routine screening guidelines. Physical Exam    Vitals and nursing note reviewed. Constitutional:       Appearance: Normal appearance. ,  HENT:      Head: Normocephalic. Nose: Nose normal.      Mouth/Throat:      Mouth: Mucous membranes are moist.   Eyes:      Pupils: Pupils are equal, round, and reactive to light. Cardiovascular:      Rate and Rhythm: Normal rate and regular rhythm. Pulmonary:      Effort: Pulmonary effort is normal.   Abdominal:      General: Abdomen is flat. Musculoskeletal:         General: Normal range of motion. Cervical back: Normal range of motion. Skin:     General: Skin is warm. Neurological:      General: No focal deficit present. Mental Status: she is alert and oriented to person, place, and time. Psychiatric:         Mood and Affect: Mood normal.         Behavior: Behavior normal.         Thought Content: Thought content normal.      ASSESSMENT and PLAN  Diagnoses and all orders for this visit:    1. Urinary incontinence, unspecified type  -     AMB POC URINALYSIS DIP STICK AUTO W/O MICRO  -     CULTURE, URINE  -     AMB POC UROFLOWMETRY  -     AMB POC PVR, MIKHAIL,POST-VOID RES,US,NON-IMAGING  -     ciprofloxacin HCl (CIPRO) tablet 500 mg    2. Atrophic vaginitis    3. Recurrent UTI    4. Cystocele, unspecified (CODE)    5. Urinary tract infection without hematuria, site unspecified     We will keep on Estrace cream as well as Ditropan see her back in 3 months          Adama Ace may have a reminder for a \"due or due soon\" health maintenance. The patient has been encouraged to contact their primary care provider for follow-up on this health maintenance or other necessary and/or routine health screening.      Erinn Sheridan MD

## 2022-12-14 ENCOUNTER — OFFICE VISIT (OUTPATIENT)
Dept: UROLOGY | Age: 72
End: 2022-12-14
Payer: MEDICARE

## 2022-12-14 VITALS
HEIGHT: 62 IN | BODY MASS INDEX: 23.55 KG/M2 | DIASTOLIC BLOOD PRESSURE: 69 MMHG | HEART RATE: 70 BPM | OXYGEN SATURATION: 98 % | SYSTOLIC BLOOD PRESSURE: 122 MMHG | RESPIRATION RATE: 16 BRPM | WEIGHT: 128 LBS | TEMPERATURE: 97.4 F

## 2022-12-14 DIAGNOSIS — N95.2 ATROPHIC VAGINITIS: ICD-10-CM

## 2022-12-14 DIAGNOSIS — R32 URINARY INCONTINENCE, UNSPECIFIED TYPE: Primary | ICD-10-CM

## 2022-12-14 DIAGNOSIS — N39.0 URINARY TRACT INFECTION WITHOUT HEMATURIA, SITE UNSPECIFIED: ICD-10-CM

## 2022-12-14 DIAGNOSIS — N39.0 RECURRENT UTI: ICD-10-CM

## 2022-12-14 DIAGNOSIS — N81.10 CYSTOCELE, UNSPECIFIED (CODE): ICD-10-CM

## 2022-12-14 LAB
AVG FLOW RATE POC: NORMAL ML/SECONDS
BILIRUB UR QL: NORMAL
FLOW TIME POC: NORMAL SECONDS
GLUCOSE UR-MCNC: NEGATIVE MG/DL
KETONES P FAST UR STRIP-MCNC: 15 MG/DL
PEAK FLOW: NORMAL ML/SECONDS
PH UR STRIP: 5 [PH] (ref 4.6–8)
PROT UR QL STRIP: NEGATIVE
PVR POC: NORMAL CC
SP GR UR STRIP: 1.02 (ref 1–1.03)
TIME TO PEAK: NORMAL SECONDS
TOTAL VOLUME POC: NORMAL ML
UA UROBILINOGEN AMB POC: NORMAL (ref 0.2–1)
URINALYSIS CLARITY POC: CLEAR
URINALYSIS COLOR POC: YELLOW
URINE BLOOD POC: NEGATIVE
URINE LEUKOCYTES POC: NORMAL
URINE NITRITES POC: NEGATIVE
VOIDING TIME POC: NORMAL SECONDS

## 2022-12-14 PROCEDURE — 1101F PT FALLS ASSESS-DOCD LE1/YR: CPT | Performed by: UROLOGY

## 2022-12-14 PROCEDURE — G8432 DEP SCR NOT DOC, RNG: HCPCS | Performed by: UROLOGY

## 2022-12-14 PROCEDURE — 51798 US URINE CAPACITY MEASURE: CPT | Performed by: UROLOGY

## 2022-12-14 PROCEDURE — 1123F ACP DISCUSS/DSCN MKR DOCD: CPT | Performed by: UROLOGY

## 2022-12-14 PROCEDURE — G8536 NO DOC ELDER MAL SCRN: HCPCS | Performed by: UROLOGY

## 2022-12-14 PROCEDURE — 51741 ELECTRO-UROFLOWMETRY FIRST: CPT | Performed by: UROLOGY

## 2022-12-14 PROCEDURE — 51725 SIMPLE CYSTOMETROGRAM: CPT | Performed by: UROLOGY

## 2022-12-14 PROCEDURE — G8420 CALC BMI NORM PARAMETERS: HCPCS | Performed by: UROLOGY

## 2022-12-14 PROCEDURE — 3017F COLORECTAL CA SCREEN DOC REV: CPT | Performed by: UROLOGY

## 2022-12-14 PROCEDURE — G8427 DOCREV CUR MEDS BY ELIG CLIN: HCPCS | Performed by: UROLOGY

## 2022-12-14 PROCEDURE — 99214 OFFICE O/P EST MOD 30 MIN: CPT | Performed by: UROLOGY

## 2022-12-14 PROCEDURE — 52000 CYSTOURETHROSCOPY: CPT | Performed by: UROLOGY

## 2022-12-14 PROCEDURE — G8400 PT W/DXA NO RESULTS DOC: HCPCS | Performed by: UROLOGY

## 2022-12-14 PROCEDURE — 81003 URINALYSIS AUTO W/O SCOPE: CPT | Performed by: UROLOGY

## 2022-12-14 PROCEDURE — 1090F PRES/ABSN URINE INCON ASSESS: CPT | Performed by: UROLOGY

## 2022-12-14 RX ORDER — CIPROFLOXACIN 500 MG/1
500 TABLET ORAL ONCE
Status: COMPLETED | OUTPATIENT
Start: 2022-12-14 | End: 2022-12-14

## 2022-12-14 RX ADMIN — CIPROFLOXACIN 500 MG: 500 TABLET ORAL at 14:00

## 2022-12-14 NOTE — PROGRESS NOTES
No chief complaint on file. PHQ-9 score is    Negative    Vitals:    12/14/22 1319   BP: 122/69   Pulse: 70   Resp: 16   Temp: 97.4 °F (36.3 °C)   SpO2: 98%   Weight: 128 lb (58.1 kg)   Height: 5' 2\" (1.575 m)        1. \"Have you been to the ER, urgent care clinic since your last visit? Hospitalized since your last visit? \" No    2. \"Have you seen or consulted any other health care providers outside of the [address] [place] [address] since your last visit? \" No     3. For patients aged 39-70: Has the patient had a colonoscopy / FIT/ Cologuard? Yes - no Care Gap present      If the patient is female:    4. For patients aged 41-77: Has the patient had a mammogram within the past 2 years? NA - based on age or sex      11. For patients aged 21-65: Has the patient had a pap smear?  NA - based on age or sex

## 2022-12-19 LAB — BACTERIA UR CULT: NORMAL

## 2022-12-28 PROBLEM — N39.0 URINARY TRACT INFECTION WITHOUT HEMATURIA: Status: ACTIVE | Noted: 2022-11-03

## 2022-12-29 ENCOUNTER — TELEPHONE (OUTPATIENT)
Dept: UROLOGY | Age: 72
End: 2022-12-29

## 2022-12-29 NOTE — TELEPHONE ENCOUNTER
Patient called stating she was supposed to be prescribed a medication she does not know the name but it needed some kind of authorization.      Please advise

## 2022-12-30 NOTE — TELEPHONE ENCOUNTER
I left  letting patient know that we havent rtecieved any prior auth requests.   In  I asked patient to contact pharmacy and have them fax us [phone] a prior auth request.

## 2023-01-10 LAB
CREATININE, EXTERNAL: 0.71
HBA1C MFR BLD HPLC: 5.8 %
LDL-C, EXTERNAL: 77

## 2023-01-24 NOTE — PROGRESS NOTES
HISTORY OF PRESENT ILLNESS    John Bello is a 81 y.o. female is here for follow on urinary incontinence recurrent UTI  She is not taking oxybutynin or estrace cream. She was prescribed gemtesa but reports no noticing any difference. She reports frequency every 2 hors and nocturia x 2. She needs to bend over after voiding to make complete emptying her bladder, which she is to do to 0 cc residual    She is s/p cystoscopy 12/4/22  Findings: Overactive bladder, she does have the ability to empty     Plan keep her on Estrace and the Ditropan  Start her on Botox injections. She is aware the risk of bleeding infection into the bladder urethra may make her worse could make her better may have to be repeated she has no questions          Past Medical History:  PMHx (including negatives):  has a past medical history of Acid reflux, Arthritis, Asthma, Back pain, Burning with urination, Chronic obstructive pulmonary disease ([address] Cirrhosis of liver ([address] Diabetes ([address] Headache, Hepatitis C, Hiatal hernia, Hypertension, Multiple sclerosis ([address] Seizures ([address] and Sleep apnea. PSurgHx:  has a past surgical history that includes hx cholecystectomy; hx hysterectomy; hx tonsillectomy; hx lipectomy (05/15/2020); hx orthopaedic; hx hysterectomy; hx urological (11/30/2022); and hx urological (12/14/2022). PSocHx:  reports that she has never smoked. She has never used smokeless tobacco. She reports current alcohol use. She reports that she does not currently use drugs. Home Medications    Medication Sig Start Date End Date Taking? Authorizing Provider   nadoloL (CORGARD) 80 mg tablet Take 80 mg by mouth daily. 12/15/22  Yes Provider, Historical   levothyroxine (SYNTHROID) 25 mcg tablet TAKE 1 TABLET BY MOUTH DAILY BEFORE BREAKFAST 12/6/22  Yes Radu Trujillo MD   Ferrex 150 150 mg iron capsule Take 150 mg by mouth two (2) times a day.  9/21/22  Yes Provider, Historical   estradioL (ESTRACE) 0.01 % (0.1 mg/gram) vaginal cream Insert 1 g into vagina every Monday, Wednesday, Friday. 11/4/22  Yes Diana Joya MD   oxybutynin chloride XL (DITROPAN XL) 10 mg CR tablet Take 1 Tablet by mouth daily. 11/3/22  Yes Diana Joya MD   glucose blood VI test strips (Accu-Chek Luanne Plus test strp) strip TEST BLOOD GLUCOSE TWICE DAILY 9/12/22  Yes Brenda Muro MD   calcium carb/vit D3/minerals (CALCIUM-VITAMIN D PO) Take 2 Tablets by mouth daily. Yes Provider, Historical   lactase (LACTAID) 3,000 unit tablet Take  by mouth as needed. Yes Provider, Historical   Janumet XR 50-1,000 mg TM24 TAKE 1 TABLET BY MOUTH TWICE DAILY WITH MEALS 2/24/22  Yes Brenda Muro MD   cloNIDine HCL (CATAPRES) 0.1 mg tablet Take 0.1 mg by mouth daily. If BP over 130   Yes Provider, Historical   colestipoL (COLESTID) 1 gram tablet Take 1 g by mouth three (3) times daily. Yes Provider, Historical   lipase-protease-amylase (CREON 24,000) 24,000-76,000 -120,000 unit capsule Take 2 Caps by mouth three (3) times daily (with meals). Yes Provider, Historical   nadoloL (CORGARD) 20 mg tablet Take 40 mg by mouth two (2) times a day. Yes Provider, Historical   albuterol (PROVENTIL HFA, VENTOLIN HFA, PROAIR HFA) 90 mcg/actuation inhaler  4/25/20  Yes Provider, Historical   hydroCHLOROthiazide (MICROZIDE) 12.5 mg capsule Take 1 Cap by mouth daily. 5/13/20  Yes Provider, Historical   lisinopriL (PRINIVIL, ZESTRIL) 40 mg tablet Take 1 Tab by mouth daily. 5/13/20  Yes Provider, Historical   montelukast (SINGULAIR) 10 mg tablet Take 1 Tab by mouth daily. 5/13/20  Yes Provider, Historical   omeprazole (PRILOSEC) 20 mg capsule Take 1 Cap by mouth daily. 5/13/20  Yes Provider, Historical   Tecfidera 240 mg cpDR Take 1 Tab by mouth two (2) times a day. 5/27/20  Yes Provider, Historical   aspirin delayed-release 81 mg tablet Take 81 mg by mouth nightly. Yes Provider, Historical   multivitamin (ONE A DAY) tablet Take 1 Tab by mouth daily.    Yes Provider, Historical   simethicone (MYLICON) 80 mg chewable tablet Take 80 mg by mouth every six (6) hours as needed for Flatulence. Yes Provider, Historical   loperamide (IMODIUM) 2 mg capsule TK ONE C PO  BID PRN 12/26/16  Yes Provider, Historical   baclofen (LIORESAL) 10 mg tablet Take  by mouth. 1/2 tab PRN  Patient not taking: No sig reported    Provider, Historical        Chronic Conditions Addressed Today       1. Seizure disorder ([facility]    2. Multiple sclerosis ([facility]    3. Episode of unresponsiveness    4. Urinary incontinence - Primary     Overview      She reports bladder prolapse for a few years  She reports urinary frequency almost every 30-40 mins, and 2-3 times at night. She restricts fluid during the day time  She has a history of UTI, but has no symptoms. Reports getting them every couple of months. She wears pads, and changes them about 2 times at night. Urine culture  Escherichia coli   She has stress and urge incontinence. She had hysterectomy  No history of breast cancer  She reports using  med for urinary incontinence. She has diarrhea, takes imodium          Relevant Orders     AMB POC PVR, MIKHAIL,POST-VOID RES,US,NON-IMAGING (Completed)     AMB POC URINALYSIS DIP STICK AUTO W/O MICRO (Completed)    5. Atrophic vaginitis    6. Cystocele, unspecified (CODE)    7. Recurrent UTI     Relevant Orders     AMB POC PVR, MIKHAIL,POST-VOID RES,US,NON-IMAGING (Completed)     AMB POC URINALYSIS DIP STICK AUTO W/O MICRO (Completed)    8. Cystitis       Review of Systems   Constitutional: Negative. HENT:  Positive for congestion. Eyes: Negative. Cardiovascular: Negative. Gastrointestinal: Negative. Genitourinary:  Positive for frequency and urgency. Musculoskeletal: Negative. Skin: Negative. Neurological: Negative. Endo/Heme/Allergies: Negative. Psychiatric/Behavioral: Negative. Patient denies the symptoms of COVID-19 per routine screening guidelines.     Physical Exam  Constitutional:       Appearance: Normal appearance. HENT:      Head: Normocephalic. Nose: Nose normal.      Mouth/Throat:      Mouth: Mucous membranes are moist.   Eyes:      Pupils: Pupils are equal, round, and reactive to light. Cardiovascular:      Rate and Rhythm: Normal rate. Pulses: Normal pulses. Pulmonary:      Effort: Pulmonary effort is normal.   Abdominal:      General: Abdomen is flat. Palpations: Abdomen is soft. Genitourinary:     Comments: deferred  Musculoskeletal:         General: Normal range of motion. Cervical back: Normal range of motion. Skin:     General: Skin is warm. Neurological:      General: No focal deficit present. Mental Status: She is alert and oriented to person, place, and time. Psychiatric:         Mood and Affect: Mood normal.         Behavior: Behavior normal.         Thought Content: Thought content normal.         Judgment: Judgment normal.   ASSESSMENT and PLAN  Diagnoses and all orders for this visit:    1. Urinary incontinence, unspecified type  -     AMB POC PVR, MIKHAIL,POST-VOID RES,US,NON-IMAGING  -     AMB POC URINALYSIS DIP STICK AUTO W/O MICRO    2. Atrophic vaginitis    3. Recurrent UTI  -     AMB POC PVR, MIKHAIL,POST-VOID RES,US,NON-IMAGING  -     AMB POC URINALYSIS DIP STICK AUTO W/O MICRO    4. Urinary tract infection without hematuria, site unspecified    5. Cystocele, unspecified (CODE)    6. Cystitis    7. Seizure disorder ([address]    8. Multiple sclerosis ([address]    9. Episode of unresponsiveness         botox in bladder 100 units/10 cc NS 1/2 cc increments in bladder. He is aware of the risk and benefits plan procedure per her request      Kendell Griffith may have a reminder for a \"due or due soon\" health maintenance. The patient has been encouraged to contact their primary care provider for follow-up on this health maintenance or other necessary and/or routine health screening.      Sanjay Gonzalez MD

## 2023-01-25 ENCOUNTER — OFFICE VISIT (OUTPATIENT)
Dept: UROLOGY | Age: 73
End: 2023-01-25
Payer: MEDICARE

## 2023-01-25 VITALS
DIASTOLIC BLOOD PRESSURE: 75 MMHG | TEMPERATURE: 96.9 F | HEART RATE: 60 BPM | WEIGHT: 128 LBS | SYSTOLIC BLOOD PRESSURE: 149 MMHG | HEIGHT: 62 IN | BODY MASS INDEX: 23.55 KG/M2

## 2023-01-25 DIAGNOSIS — N30.90 CYSTITIS: ICD-10-CM

## 2023-01-25 DIAGNOSIS — R32 URINARY INCONTINENCE, UNSPECIFIED TYPE: Primary | ICD-10-CM

## 2023-01-25 DIAGNOSIS — N39.0 RECURRENT UTI: ICD-10-CM

## 2023-01-25 DIAGNOSIS — G35 MULTIPLE SCLEROSIS (HCC): ICD-10-CM

## 2023-01-25 DIAGNOSIS — N81.10 CYSTOCELE, UNSPECIFIED (CODE): ICD-10-CM

## 2023-01-25 DIAGNOSIS — R41.89 EPISODE OF UNRESPONSIVENESS: ICD-10-CM

## 2023-01-25 DIAGNOSIS — N95.2 ATROPHIC VAGINITIS: ICD-10-CM

## 2023-01-25 DIAGNOSIS — G40.909 SEIZURE DISORDER (HCC): ICD-10-CM

## 2023-01-25 DIAGNOSIS — N39.0 URINARY TRACT INFECTION WITHOUT HEMATURIA, SITE UNSPECIFIED: ICD-10-CM

## 2023-01-25 LAB
BILIRUB UR QL: NEGATIVE
GLUCOSE UR-MCNC: NEGATIVE MG/DL
KETONES P FAST UR STRIP-MCNC: NEGATIVE MG/DL
PH UR STRIP: 5.5 [PH] (ref 4.6–8)
PROT UR QL STRIP: NEGATIVE
PVR POC: NORMAL CC
SP GR UR STRIP: 1.01 (ref 1–1.03)
UA UROBILINOGEN AMB POC: NORMAL (ref 0.2–1)
URINALYSIS CLARITY POC: CLEAR
URINALYSIS COLOR POC: YELLOW
URINE BLOOD POC: NEGATIVE
URINE LEUKOCYTES POC: NEGATIVE
URINE NITRITES POC: NEGATIVE

## 2023-01-25 PROCEDURE — 3077F SYST BP >= 140 MM HG: CPT | Performed by: UROLOGY

## 2023-01-25 PROCEDURE — 1101F PT FALLS ASSESS-DOCD LE1/YR: CPT | Performed by: UROLOGY

## 2023-01-25 PROCEDURE — 81003 URINALYSIS AUTO W/O SCOPE: CPT | Performed by: UROLOGY

## 2023-01-25 PROCEDURE — G8427 DOCREV CUR MEDS BY ELIG CLIN: HCPCS | Performed by: UROLOGY

## 2023-01-25 PROCEDURE — 3017F COLORECTAL CA SCREEN DOC REV: CPT | Performed by: UROLOGY

## 2023-01-25 PROCEDURE — G8400 PT W/DXA NO RESULTS DOC: HCPCS | Performed by: UROLOGY

## 2023-01-25 PROCEDURE — G8420 CALC BMI NORM PARAMETERS: HCPCS | Performed by: UROLOGY

## 2023-01-25 PROCEDURE — 1123F ACP DISCUSS/DSCN MKR DOCD: CPT | Performed by: UROLOGY

## 2023-01-25 PROCEDURE — 99214 OFFICE O/P EST MOD 30 MIN: CPT | Performed by: UROLOGY

## 2023-01-25 PROCEDURE — 1090F PRES/ABSN URINE INCON ASSESS: CPT | Performed by: UROLOGY

## 2023-01-25 PROCEDURE — G8536 NO DOC ELDER MAL SCRN: HCPCS | Performed by: UROLOGY

## 2023-01-25 PROCEDURE — G8510 SCR DEP NEG, NO PLAN REQD: HCPCS | Performed by: UROLOGY

## 2023-01-25 PROCEDURE — 51798 US URINE CAPACITY MEASURE: CPT | Performed by: UROLOGY

## 2023-01-25 RX ORDER — NADOLOL 80 MG/1
80 TABLET ORAL DAILY
COMMUNITY
Start: 2022-12-15

## 2023-01-25 NOTE — PROGRESS NOTES
Chief Complaint   Patient presents with    Follow-up    Vaginitis    Incontinence    Recurrent UTI       PHQ-9 score is    Negative    Vitals:    01/25/23 1302   BP: (!) 149/75   Pulse: 60   Temp: 96.9 °F (36.1 °C)   Weight: 128 lb (58.1 kg)   Height: 5' 2\" (1.575 m)        1. \"Have you been to the ER, urgent care clinic since your last visit? Hospitalized since your last visit? \" No    2. \"Have you seen or consulted any other health care providers outside of the [address] [place] [address] since your last visit? \" No     3. For patients aged 39-70: Has the patient had a colonoscopy / FIT/ Cologuard? Yes - no Care Gap present      If the patient is female:    4. For patients aged 41-77: Has the patient had a mammogram within the past 2 years? Yes - no Care Gap present      5. For patients aged 21-65: Has the patient had a pap smear?  Yes - no Care Gap present

## 2023-01-30 ENCOUNTER — OFFICE VISIT (OUTPATIENT)
Dept: UROLOGY | Age: 73
End: 2023-01-30
Payer: MEDICARE

## 2023-01-30 DIAGNOSIS — N39.0 RECURRENT UTI: ICD-10-CM

## 2023-01-30 DIAGNOSIS — N30.90 CYSTITIS: Primary | ICD-10-CM

## 2023-01-30 DIAGNOSIS — N95.2 ATROPHIC VAGINITIS: ICD-10-CM

## 2023-01-30 LAB
BILIRUB UR QL: NEGATIVE
GLUCOSE UR-MCNC: NEGATIVE MG/DL
KETONES P FAST UR STRIP-MCNC: NEGATIVE MG/DL
PH UR STRIP: 6 [PH] (ref 4.6–8)
PROT UR QL STRIP: NEGATIVE
SP GR UR STRIP: 1.01 (ref 1–1.03)
UA UROBILINOGEN AMB POC: NORMAL (ref 0.2–1)
URINALYSIS CLARITY POC: CLEAR
URINALYSIS COLOR POC: YELLOW
URINE BLOOD POC: NEGATIVE
URINE LEUKOCYTES POC: NORMAL
URINE NITRITES POC: NEGATIVE

## 2023-01-30 PROCEDURE — G8428 CUR MEDS NOT DOCUMENT: HCPCS | Performed by: NURSE PRACTITIONER

## 2023-01-30 PROCEDURE — 99213 OFFICE O/P EST LOW 20 MIN: CPT | Performed by: NURSE PRACTITIONER

## 2023-01-30 PROCEDURE — 3017F COLORECTAL CA SCREEN DOC REV: CPT | Performed by: NURSE PRACTITIONER

## 2023-01-30 PROCEDURE — 1101F PT FALLS ASSESS-DOCD LE1/YR: CPT | Performed by: NURSE PRACTITIONER

## 2023-01-30 PROCEDURE — 1123F ACP DISCUSS/DSCN MKR DOCD: CPT | Performed by: NURSE PRACTITIONER

## 2023-01-30 PROCEDURE — 1090F PRES/ABSN URINE INCON ASSESS: CPT | Performed by: NURSE PRACTITIONER

## 2023-01-30 PROCEDURE — G8432 DEP SCR NOT DOC, RNG: HCPCS | Performed by: NURSE PRACTITIONER

## 2023-01-30 PROCEDURE — 81003 URINALYSIS AUTO W/O SCOPE: CPT | Performed by: NURSE PRACTITIONER

## 2023-01-30 PROCEDURE — G8420 CALC BMI NORM PARAMETERS: HCPCS | Performed by: NURSE PRACTITIONER

## 2023-01-30 PROCEDURE — G8400 PT W/DXA NO RESULTS DOC: HCPCS | Performed by: NURSE PRACTITIONER

## 2023-01-30 PROCEDURE — G8536 NO DOC ELDER MAL SCRN: HCPCS | Performed by: NURSE PRACTITIONER

## 2023-01-30 RX ORDER — CIPROFLOXACIN 250 MG/1
250 TABLET, FILM COATED ORAL 2 TIMES DAILY
Qty: 14 TABLET | Refills: 0 | Status: SHIPPED | OUTPATIENT
Start: 2023-01-30 | End: 2023-02-06

## 2023-02-01 NOTE — PROGRESS NOTES
HISTORY OF PRESENT ILLNESS    Na Ferguson is a 81 y.o. female is here with cc of UTI infection She reports recently finishing abx and after 3-4 days getting infection again. She rpeorts burning sensation with voiding, frequency and voiding few drops at times. UA is positive for large blood. PVR is 0 cc  She is s/p cystoscopy 12/4/22  Findings: Overactive bladder, she does have the ability to empty   She is scheduled for bladder Botox    Last urine culture from 11/30/22 positive for Escherichia coli and susceptible to cipro. Will sent urine for culture and put patient on cipro    Chief Complaint   Patient presents with    Follow-up    Recurrent UTI            Past Medical History:  PMHx (including negatives):  has a past medical history of Acid reflux, Arthritis, Asthma, Back pain, Burning with urination, Chronic obstructive pulmonary disease ([address] Cirrhosis of liver ([address] Diabetes ([address] Headache, Hepatitis C, Hiatal hernia, Hypertension, Multiple sclerosis ([address] Seizures ([address] and Sleep apnea. PSurgHx:  has a past surgical history that includes hx cholecystectomy; hx hysterectomy; hx tonsillectomy; hx lipectomy (05/15/2020); hx orthopaedic; hx hysterectomy; hx urological (11/30/2022); and hx urological (12/14/2022). PSocHx:  reports that she has never smoked. She has never used smokeless tobacco. She reports current alcohol use. She reports that she does not currently use drugs. Home Medications    Medication Sig Start Date End Date Taking? Authorizing Provider   ciprofloxacin HCl (CIPRO) 250 mg tablet Take 1 Tablet by mouth two (2) times a day for 7 days. 1/30/23 2/6/23 Yes Gemma Nelson, NP   nadoloL (CORGARD) 80 mg tablet Take 80 mg by mouth daily. 12/15/22   Provider, Historical   levothyroxine (SYNTHROID) 25 mcg tablet TAKE 1 TABLET BY MOUTH DAILY BEFORE BREAKFAST 12/6/22   Greyson Peñaloaz MD   Ferrex 150 150 mg iron capsule Take 150 mg by mouth two (2) times a day.  9/21/22   Provider, Historical   estradioL (ESTRACE) 0.01 % (0.1 mg/gram) vaginal cream Insert 1 g into vagina every Monday, Wednesday, Friday. 11/4/22   Elicia Waller MD   oxybutynin chloride XL (DITROPAN XL) 10 mg CR tablet Take 1 Tablet by mouth daily. 11/3/22   Elicia Waller MD   glucose blood VI test strips (Accu-Chek Luanne Plus test strp) strip TEST BLOOD GLUCOSE TWICE DAILY 9/12/22   Surya Gray MD   calcium carb/vit D3/minerals (CALCIUM-VITAMIN D PO) Take 2 Tablets by mouth daily. Provider, Historical   lactase (LACTAID) 3,000 unit tablet Take  by mouth as needed. Provider, Historical   Janumet XR 50-1,000 mg TM24 TAKE 1 TABLET BY MOUTH TWICE DAILY WITH MEALS 2/24/22   Surya Gray MD   cloNIDine HCL (CATAPRES) 0.1 mg tablet Take 0.1 mg by mouth daily. If BP over 130    Provider, Historical   baclofen (LIORESAL) 10 mg tablet Take  by mouth. 1/2 tab PRN  Patient not taking: No sig reported    Provider, Historical   colestipoL (COLESTID) 1 gram tablet Take 1 g by mouth three (3) times daily. Provider, Historical   lipase-protease-amylase (CREON 24,000) 24,000-76,000 -120,000 unit capsule Take 2 Caps by mouth three (3) times daily (with meals). Provider, Historical   nadoloL (CORGARD) 20 mg tablet Take 40 mg by mouth two (2) times a day. Provider, Historical   albuterol (PROVENTIL HFA, VENTOLIN HFA, PROAIR HFA) 90 mcg/actuation inhaler  4/25/20   Provider, Historical   hydroCHLOROthiazide (MICROZIDE) 12.5 mg capsule Take 1 Cap by mouth daily. 5/13/20   Provider, Historical   lisinopriL (PRINIVIL, ZESTRIL) 40 mg tablet Take 1 Tab by mouth daily. 5/13/20   Provider, Historical   montelukast (SINGULAIR) 10 mg tablet Take 1 Tab by mouth daily. 5/13/20   Provider, Historical   omeprazole (PRILOSEC) 20 mg capsule Take 1 Cap by mouth daily. 5/13/20   Provider, Historical   Tecfidera 240 mg cpDR Take 1 Tab by mouth two (2) times a day.  5/27/20   Provider, Historical   aspirin delayed-release 81 mg tablet Take 81 mg by mouth nightly. Provider, Historical   multivitamin (ONE A DAY) tablet Take 1 Tab by mouth daily. Provider, Historical   simethicone (MYLICON) 80 mg chewable tablet Take 80 mg by mouth every six (6) hours as needed for Flatulence. Provider, Historical   loperamide (IMODIUM) 2 mg capsule TK ONE C PO  BID PRN 12/26/16   Provider, Historical        Chronic Conditions Addressed Today       1. Cystitis - Primary     Relevant Orders     CULTURE, URINE     AMB POC URINALYSIS DIP STICK AUTO W/O MICRO (Completed)       Review of Systems   Constitutional: Negative. HENT: Negative. Respiratory: Negative. Cardiovascular: Negative. Genitourinary:  Positive for frequency and urgency. Musculoskeletal: Negative. Skin: Negative. Neurological: Negative. Endo/Heme/Allergies: Negative. Psychiatric/Behavioral: Negative. Patient denies the symptoms of COVID-19 per routine screening guidelines. Physical Exam  HENT:      Head: Normocephalic. Mouth/Throat:      Mouth: Mucous membranes are moist.   Pulmonary:      Effort: Pulmonary effort is normal.   Musculoskeletal:         General: Normal range of motion. Neurological:      General: No focal deficit present. Mental Status: She is alert. ASSESSMENT and PLAN  Diagnoses and all orders for this visit:    1. Cystitis  -     CULTURE, URINE  -     AMB POC URINALYSIS DIP STICK AUTO W/O MICRO    Other orders  -     ciprofloxacin HCl (CIPRO) 250 mg tablet; Take 1 Tablet by mouth two (2) times a day for 7 days. Catherine Nageotte may have a reminder for a \"due or due soon\" health maintenance. The patient has been encouraged to contact their primary care provider for follow-up on this health maintenance or other necessary and/or routine health screening.      José Miguel Quezada NP

## 2023-02-04 LAB — BACTERIA UR CULT: ABNORMAL

## 2023-02-07 DIAGNOSIS — E11.65 TYPE 2 DIABETES MELLITUS WITH HYPERGLYCEMIA, UNSPECIFIED WHETHER LONG TERM INSULIN USE (HCC): ICD-10-CM

## 2023-02-08 RX ORDER — SITAGLIPTIN AND METFORMIN HYDROCHLORIDE 50; 1000 MG/1; MG/1
TABLET, FILM COATED, EXTENDED RELEASE ORAL
Qty: 180 TABLET | Refills: 3 | Status: SHIPPED | OUTPATIENT
Start: 2023-02-08

## 2023-02-14 ENCOUNTER — OFFICE VISIT (OUTPATIENT)
Dept: UROLOGY | Age: 73
End: 2023-02-14
Payer: MEDICARE

## 2023-02-14 VITALS
HEART RATE: 77 BPM | OXYGEN SATURATION: 100 % | TEMPERATURE: 97.8 F | HEIGHT: 68 IN | BODY MASS INDEX: 19.4 KG/M2 | SYSTOLIC BLOOD PRESSURE: 122 MMHG | DIASTOLIC BLOOD PRESSURE: 69 MMHG | WEIGHT: 128 LBS

## 2023-02-14 DIAGNOSIS — N30.90 CYSTITIS: ICD-10-CM

## 2023-02-14 DIAGNOSIS — N39.0 RECURRENT UTI: Primary | ICD-10-CM

## 2023-02-14 DIAGNOSIS — N95.2 ATROPHIC VAGINITIS: ICD-10-CM

## 2023-02-14 DIAGNOSIS — N39.498 OTHER URINARY INCONTINENCE: ICD-10-CM

## 2023-02-14 LAB
BILIRUB UR QL: NEGATIVE
GLUCOSE UR-MCNC: NEGATIVE MG/DL
KETONES P FAST UR STRIP-MCNC: NEGATIVE MG/DL
PH UR STRIP: 6 [PH] (ref 4.6–8)
PROT UR QL STRIP: NEGATIVE
PVR POC: NORMAL CC
SP GR UR STRIP: 1.02 (ref 1–1.03)
UA UROBILINOGEN AMB POC: NORMAL (ref 0.2–1)
URINALYSIS CLARITY POC: CLEAR
URINALYSIS COLOR POC: YELLOW
URINE BLOOD POC: NEGATIVE
URINE LEUKOCYTES POC: NEGATIVE
URINE NITRITES POC: NEGATIVE

## 2023-02-14 PROCEDURE — 1101F PT FALLS ASSESS-DOCD LE1/YR: CPT | Performed by: NURSE PRACTITIONER

## 2023-02-14 PROCEDURE — G8420 CALC BMI NORM PARAMETERS: HCPCS | Performed by: NURSE PRACTITIONER

## 2023-02-14 PROCEDURE — 51798 US URINE CAPACITY MEASURE: CPT | Performed by: NURSE PRACTITIONER

## 2023-02-14 PROCEDURE — 1090F PRES/ABSN URINE INCON ASSESS: CPT | Performed by: NURSE PRACTITIONER

## 2023-02-14 PROCEDURE — G8427 DOCREV CUR MEDS BY ELIG CLIN: HCPCS | Performed by: NURSE PRACTITIONER

## 2023-02-14 PROCEDURE — 81003 URINALYSIS AUTO W/O SCOPE: CPT | Performed by: NURSE PRACTITIONER

## 2023-02-14 PROCEDURE — G8536 NO DOC ELDER MAL SCRN: HCPCS | Performed by: NURSE PRACTITIONER

## 2023-02-14 PROCEDURE — 99212 OFFICE O/P EST SF 10 MIN: CPT | Performed by: NURSE PRACTITIONER

## 2023-02-14 PROCEDURE — G8400 PT W/DXA NO RESULTS DOC: HCPCS | Performed by: NURSE PRACTITIONER

## 2023-02-14 PROCEDURE — 1123F ACP DISCUSS/DSCN MKR DOCD: CPT | Performed by: NURSE PRACTITIONER

## 2023-02-14 PROCEDURE — 3017F COLORECTAL CA SCREEN DOC REV: CPT | Performed by: NURSE PRACTITIONER

## 2023-02-14 PROCEDURE — G8432 DEP SCR NOT DOC, RNG: HCPCS | Performed by: NURSE PRACTITIONER

## 2023-02-14 NOTE — PROGRESS NOTES
Chief Complaint   Patient presents with    Follow-up    Recurrent UTI    Bladder Infection       PHQ-9 score is    Negative    Vitals:    02/14/23 1252   BP: 122/69   Pulse: 77   Temp: 97.8 °F (36.6 °C)   TempSrc: Temporal   SpO2: 100%   Weight: 128 lb (58.1 kg)   Height: 5' 8\" (1.727 m)   PainSc:   0 - No pain        1. \"Have you been to the ER, urgent care clinic since your last visit? Hospitalized since your last visit? \" No    2. \"Have you seen or consulted any other health care providers outside of the [address] [place] [address] since your last visit? \" No     3. For patients aged 39-70: Has the patient had a colonoscopy / FIT/ Cologuard? No      If the patient is female:    4. For patients aged 41-77: Has the patient had a mammogram within the past 2 years? No      5. For patients aged 21-65: Has the patient had a pap smear?  No

## 2023-02-14 NOTE — PROGRESS NOTES
HISTORY OF PRESENT ILLNESS    Kecia Ervin is a 81 y.o. female is here for follow up on her UTI infection  She had positive urine culture for Klebsiella pneumoniae  on 1/30/23 and was  treated with cipro. Today patient denies and pain/ burning sensation with voiding. She rpeorts still having frequency, but not as bad as with UTI infection . She takes oxybutynin and estrace cream but is not noticing any difference,  She is s/p cystoscopy on 12/4/22 and was found to have overactive bladder  PVR today was 180 cc. She could not void the second time. Past Medical History:  PMHx (including negatives):  has a past medical history of Acid reflux, Arthritis, Asthma, Back pain, Burning with urination, Chronic obstructive pulmonary disease ([address] Cirrhosis of liver ([address] Diabetes ([address] Headache, Hepatitis C, Hiatal hernia, Hypertension, Multiple sclerosis ([address] Seizures ([address] and Sleep apnea. PSurgHx:  has a past surgical history that includes hx cholecystectomy; hx hysterectomy; hx tonsillectomy; hx lipectomy (05/15/2020); hx orthopaedic; hx hysterectomy; hx urological (11/30/2022); and hx urological (12/14/2022). PSocHx:  reports that she has never smoked. She has never used smokeless tobacco. She reports current alcohol use. She reports that she does not currently use drugs. Home Medications    Medication Sig Start Date End Date Taking? Authorizing Provider   Janumet XR 50-1,000 mg TM24 TAKE 1 TABLET BY MOUTH TWICE DAILY WITH MEALS 2/8/23  Yes Angel Rinne, MD   nadoloL (CORGARD) 80 mg tablet Take 80 mg by mouth daily. 12/15/22  Yes Provider, Historical   levothyroxine (SYNTHROID) 25 mcg tablet TAKE 1 TABLET BY MOUTH DAILY BEFORE BREAKFAST 12/6/22  Yes Demetri Flores MD   Ferrex 150 150 mg iron capsule Take 150 mg by mouth two (2) times a day. 9/21/22  Yes Provider, Historical   estradioL (ESTRACE) 0.01 % (0.1 mg/gram) vaginal cream Insert 1 g into vagina every Monday, Wednesday, Friday.  11/4/22 Yes Farzana Rinaldi MD   oxybutynin chloride XL (DITROPAN XL) 10 mg CR tablet Take 1 Tablet by mouth daily. 11/3/22  Yes Farzana Rinaldi MD   glucose blood VI test strips (Accu-Chek Luanne Plus test strp) strip TEST BLOOD GLUCOSE TWICE DAILY 9/12/22  Yes Guanako Whitehead MD   calcium carb/vit D3/minerals (CALCIUM-VITAMIN D PO) Take 2 Tablets by mouth daily. Yes Provider, Historical   lactase (LACTAID) 3,000 unit tablet Take  by mouth as needed. Yes Provider, Historical   cloNIDine HCL (CATAPRES) 0.1 mg tablet Take 0.1 mg by mouth daily. If BP over 130   Yes Provider, Historical   colestipoL (COLESTID) 1 gram tablet Take 1 g by mouth three (3) times daily. Yes Provider, Historical   lipase-protease-amylase (CREON 24,000) 24,000-76,000 -120,000 unit capsule Take 2 Caps by mouth three (3) times daily (with meals). Yes Provider, Historical   nadoloL (CORGARD) 20 mg tablet Take 40 mg by mouth two (2) times a day. Yes Provider, Historical   albuterol (PROVENTIL HFA, VENTOLIN HFA, PROAIR HFA) 90 mcg/actuation inhaler  4/25/20  Yes Provider, Historical   hydroCHLOROthiazide (MICROZIDE) 12.5 mg capsule Take 1 Cap by mouth daily. 5/13/20  Yes Provider, Historical   lisinopriL (PRINIVIL, ZESTRIL) 40 mg tablet Take 1 Tab by mouth daily. 5/13/20  Yes Provider, Historical   montelukast (SINGULAIR) 10 mg tablet Take 1 Tab by mouth daily. 5/13/20  Yes Provider, Historical   omeprazole (PRILOSEC) 20 mg capsule Take 1 Cap by mouth daily. 5/13/20  Yes Provider, Historical   Tecfidera 240 mg cpDR Take 1 Tab by mouth two (2) times a day. 5/27/20  Yes Provider, Historical   aspirin delayed-release 81 mg tablet Take 81 mg by mouth nightly. Yes Provider, Historical   multivitamin (ONE A DAY) tablet Take 1 Tab by mouth daily. Yes Provider, Historical   simethicone (MYLICON) 80 mg chewable tablet Take 80 mg by mouth every six (6) hours as needed for Flatulence.    Yes Provider, Historical   loperamide (IMODIUM) 2 mg capsule TK ONE C PO  BID PRN 12/26/16  Yes Provider, Historical   baclofen (LIORESAL) 10 mg tablet Take  by mouth. 1/2 tab PRN  Patient not taking: No sig reported    Provider, Historical        Chronic Conditions Addressed Today       1. Urinary incontinence     Overview      She reports bladder prolapse for a few years  She reports urinary frequency almost every 30-40 mins, and 2-3 times at night. She restricts fluid during the day time  She has a history of UTI, but has no symptoms. Reports getting them every couple of months. She wears pads, and changes them about 2 times at night. Urine culture  Escherichia coli   She has stress and urge incontinence. She had hysterectomy  No history of breast cancer  She reports using  med for urinary incontinence. She has diarrhea, takes imodium          Relevant Orders     AMB POC URINALYSIS DIP STICK AUTO W/O MICRO (Completed)     AMB POC PVR, MIKHAIL,POST-VOID RES,US,NON-IMAGING (Completed)    2. Atrophic vaginitis    3. Recurrent UTI - Primary     Relevant Orders     AMB POC URINALYSIS DIP STICK AUTO W/O MICRO (Completed)     AMB POC PVR, MIKHAIL,POST-VOID RES,US,NON-IMAGING (Completed)    4. Cystitis     Relevant Orders     AMB POC URINALYSIS DIP STICK AUTO W/O MICRO (Completed)     AMB POC PVR, MIKHAIL,POST-VOID RES,US,NON-IMAGING (Completed)       Review of Systems   Constitutional: Negative. HENT: Negative. Eyes: Negative. Respiratory: Negative. Cardiovascular: Negative. Gastrointestinal: Negative. Genitourinary:  Positive for frequency. Musculoskeletal: Negative. Skin: Negative. Neurological: Negative. Endo/Heme/Allergies: Negative. Psychiatric/Behavioral: Negative. Patient denies the symptoms of COVID-19 per routine screening guidelines. Physical Exam  HENT:      Head: Normocephalic. Nose: Nose normal.   Eyes:      Pupils: Pupils are equal, round, and reactive to light.    Pulmonary:      Effort: Pulmonary effort is normal. Abdominal:      General: Abdomen is flat. Musculoskeletal:      Cervical back: Normal range of motion. Skin:     General: Skin is warm. Capillary Refill: Capillary refill takes less than 2 seconds. Neurological:      General: No focal deficit present. Mental Status: She is alert. Psychiatric:         Mood and Affect: Mood normal.       ASSESSMENT and PLAN  Diagnoses and all orders for this visit:    1. Recurrent UTI  -     AMB POC URINALYSIS DIP STICK AUTO W/O MICRO  -     AMB POC PVR, MIKHAIL,POST-VOID RES,US,NON-IMAGING    2. Cystitis  -     AMB POC URINALYSIS DIP STICK AUTO W/O MICRO  -     AMB POC PVR, MIKHAIL,POST-VOID RES,US,NON-IMAGING    3. Other urinary incontinence  -     AMB POC URINALYSIS DIP STICK AUTO W/O MICRO  -     AMB POC PVR, MIKHAIL,POST-VOID RES,US,NON-IMAGING    4. Atrophic vaginitis     She will continue to use estrace cream and oxybutynin   She is scheduled for bladder Botox in 2 weeks          Brien Lowery may have a reminder for a \"due or due soon\" health maintenance. The patient has been encouraged to contact their primary care provider for follow-up on this health maintenance or other necessary and/or routine health screening.      Lety Pepe NP

## 2023-02-18 NOTE — DISCHARGE INSTRUCTIONS
Post Botox Injection      After the treatment procedure: You may resume your usual activity and diet  Continue taking the antibiotic that was prescribed to you until finished  You should not experience significant pain, although it may sting or burn when you urinate the first few times. You may also see some blood in the urine right after treatment. If either of these symptoms persist please notify doctor. Please call your doctor if you experience any unusual symptoms, especially:    Bleeding that is more than a very small amount or last longer than 24 hours  Signs of infection (fever over 100.4, chills, frequent urination, the strong need to urinate right away [urgency] or pain on urination)  The inability to pass urine of fully empty your bladder                             *Don't forget follow up Appointment*            Use Tylenol for pain. Do NOT use medications such as aspirin, ibuprofen or Aleve for 7 days. Avoid dehydration. Drink plenty of water. Expect blood per the rectum 1-2 days. Expect intermittent blood in the urine or semen for weeks.    No straining or heavy lifting for 2 days

## 2023-02-21 NOTE — PERIOP NOTES
[address] office called with permission given by patient during phone interview, requested most recent office note and any cardiac test results to be faxed to PAT dept as soon as possible, office staff stated they do not have the patient in their records. Patient called at home # and made aware , pt stated when she was last seen by Dr. Brie Bran , her last name was Daja Khanna. 0 Dr. Jazmín [place] office called, requested information as noted above and made aware pt stated her last name was Daja Khanna at that time, office staff stated will fax most recent office note done 2016 as soon as possible. 1 Dr. Ene Chawla office called, with permission given by patient during phone interview, requested for Dr. Mary Jo Mcneill to be made aware that pt stated during phone interview that she was instructed by surgeon to hold her aspirin for 7 days prior to surgery, requested for a note with aspirin recommendations to be faxed to PAT dept as soon as possible.

## 2023-02-21 NOTE — PERIOP NOTES
1300 Patient stated during phone interview that she was instructed by Dr. Boom Park to hold her aspirin 81mg for 7 days prior to procedure.

## 2023-02-22 NOTE — PERIOP NOTES
0800 Nurse called from Dr. Saleem Owens office, stated Dr. Shari Douglass is not the prescriber of aspirin for patient, she stated she is prescribed her aspirin by Dr. Lopez Antis. 0 Dr. Duarte Garcia office called, left a message for [facility], medical assistant re: patient holding aspirin for 1 week for surgery scheduled on 2/24/2023, with Dr. Mandy Van, requested for Dr. Gaby Galeano to be made aware and call to call to PAT dept with recommendations.

## 2023-02-23 NOTE — PERIOP NOTES
[address] [place] [address] Dr. Zayda Spears office called, left a message for medical assistant, Steffi Burks re: no call received back re: message left that patient holding aspirin for surgery, requested for a call back to PAT dept as soon as possible. Dr. Florian Grossman office called, spoke with Donelda Boast, CMA, made aware of above notes re: no aspirin note or call with aspirin recommendations as yet from provider, requested for Dr. Jeronimo Don to be made aware.

## 2023-02-24 ENCOUNTER — HOSPITAL ENCOUNTER (OUTPATIENT)
Age: 73
Discharge: HOME | End: 2023-02-24
Attending: UROLOGY | Admitting: UROLOGY
Payer: MEDICARE

## 2023-02-24 ENCOUNTER — ANESTHESIA (OUTPATIENT)
Dept: SURGERY | Age: 73
End: 2023-02-24
Payer: MEDICARE

## 2023-02-24 ENCOUNTER — ANESTHESIA EVENT (OUTPATIENT)
Dept: SURGERY | Age: 73
End: 2023-02-24
Payer: MEDICARE

## 2023-02-24 VITALS
TEMPERATURE: 97.1 F | DIASTOLIC BLOOD PRESSURE: 80 MMHG | RESPIRATION RATE: 18 BRPM | SYSTOLIC BLOOD PRESSURE: 165 MMHG | BODY MASS INDEX: 22.63 KG/M2 | HEIGHT: 62 IN | WEIGHT: 123 LBS | OXYGEN SATURATION: 98 % | HEART RATE: 80 BPM

## 2023-02-24 PROBLEM — N39.41 URGE INCONTINENCE: Status: ACTIVE | Noted: 2023-02-24

## 2023-02-24 LAB
CA-I BLD-MCNC: 1.36 MMOL/L (ref 1.12–1.32)
CHLORIDE BLD-SCNC: 104 MMOL/L (ref 98–107)
CREAT UR-MCNC: 0.96 MG/DL (ref 0.6–1.3)
GLUCOSE BLD STRIP.AUTO-MCNC: 96 MG/DL (ref 65–100)
POTASSIUM BLD-SCNC: 4.1 MMOL/L (ref 3.5–5.5)
SODIUM BLD-SCNC: 141 MMOL/L (ref 136–145)

## 2023-02-24 PROCEDURE — 76010000160 HC OR TIME 0.5 TO 1 HR INTENSV-TIER 1: Performed by: UROLOGY

## 2023-02-24 PROCEDURE — 74011250636 HC RX REV CODE- 250/636: Performed by: NURSE ANESTHETIST, CERTIFIED REGISTERED

## 2023-02-24 PROCEDURE — 74011000250 HC RX REV CODE- 250: Performed by: UROLOGY

## 2023-02-24 PROCEDURE — 77030010509 HC AIRWY LMA MSK TELE -A: Performed by: ANESTHESIOLOGY

## 2023-02-24 PROCEDURE — 77030038197 HC NDL INJECTAK LABO -B: Performed by: UROLOGY

## 2023-02-24 PROCEDURE — 76210000022 HC REC RM PH II 1.5 TO 2 HR: Performed by: UROLOGY

## 2023-02-24 PROCEDURE — 2709999900 HC NON-CHARGEABLE SUPPLY: Performed by: UROLOGY

## 2023-02-24 PROCEDURE — 77030040361 HC SLV COMPR DVT MDII -B: Performed by: UROLOGY

## 2023-02-24 PROCEDURE — 74011250636 HC RX REV CODE- 250/636: Performed by: ANESTHESIOLOGY

## 2023-02-24 PROCEDURE — 76210000006 HC OR PH I REC 0.5 TO 1 HR: Performed by: UROLOGY

## 2023-02-24 PROCEDURE — 74011250636 HC RX REV CODE- 250/636: Performed by: UROLOGY

## 2023-02-24 PROCEDURE — 74011000250 HC RX REV CODE- 250: Performed by: NURSE ANESTHETIST, CERTIFIED REGISTERED

## 2023-02-24 PROCEDURE — 52287 CYSTOSCOPY CHEMODENERVATION: CPT | Performed by: UROLOGY

## 2023-02-24 PROCEDURE — 52234 CYSTOSCOPY AND TREATMENT: CPT | Performed by: UROLOGY

## 2023-02-24 PROCEDURE — 52260 CYSTOSCOPY AND TREATMENT: CPT | Performed by: UROLOGY

## 2023-02-24 PROCEDURE — 74011250637 HC RX REV CODE- 250/637: Performed by: ANESTHESIOLOGY

## 2023-02-24 PROCEDURE — 80047 BASIC METABLC PNL IONIZED CA: CPT

## 2023-02-24 PROCEDURE — 76060000032 HC ANESTHESIA 0.5 TO 1 HR: Performed by: UROLOGY

## 2023-02-24 RX ORDER — FENTANYL CITRATE 50 UG/ML
INJECTION, SOLUTION INTRAMUSCULAR; INTRAVENOUS AS NEEDED
Status: DISCONTINUED | OUTPATIENT
Start: 2023-02-24 | End: 2023-02-24 | Stop reason: HOSPADM

## 2023-02-24 RX ORDER — KETOROLAC TROMETHAMINE 30 MG/ML
INJECTION, SOLUTION INTRAMUSCULAR; INTRAVENOUS AS NEEDED
Status: DISCONTINUED | OUTPATIENT
Start: 2023-02-24 | End: 2023-02-24 | Stop reason: HOSPADM

## 2023-02-24 RX ORDER — DEXAMETHASONE SODIUM PHOSPHATE 4 MG/ML
INJECTION, SOLUTION INTRA-ARTICULAR; INTRALESIONAL; INTRAMUSCULAR; INTRAVENOUS; SOFT TISSUE AS NEEDED
Status: DISCONTINUED | OUTPATIENT
Start: 2023-02-24 | End: 2023-02-24 | Stop reason: HOSPADM

## 2023-02-24 RX ORDER — MORPHINE SULFATE 2 MG/ML
2 INJECTION, SOLUTION INTRAMUSCULAR; INTRAVENOUS
Status: DISCONTINUED | OUTPATIENT
Start: 2023-02-24 | End: 2023-02-24 | Stop reason: HOSPADM

## 2023-02-24 RX ORDER — LIDOCAINE HYDROCHLORIDE 20 MG/ML
JELLY TOPICAL AS NEEDED
Status: DISCONTINUED | OUTPATIENT
Start: 2023-02-24 | End: 2023-02-24 | Stop reason: HOSPADM

## 2023-02-24 RX ORDER — LIDOCAINE HYDROCHLORIDE 20 MG/ML
INJECTION, SOLUTION EPIDURAL; INFILTRATION; INTRACAUDAL; PERINEURAL AS NEEDED
Status: DISCONTINUED | OUTPATIENT
Start: 2023-02-24 | End: 2023-02-24 | Stop reason: HOSPADM

## 2023-02-24 RX ORDER — MIDAZOLAM HYDROCHLORIDE 1 MG/ML
0.5 INJECTION, SOLUTION INTRAMUSCULAR; INTRAVENOUS
Status: DISCONTINUED | OUTPATIENT
Start: 2023-02-24 | End: 2023-02-24 | Stop reason: HOSPADM

## 2023-02-24 RX ORDER — MIDAZOLAM HYDROCHLORIDE 1 MG/ML
1 INJECTION, SOLUTION INTRAMUSCULAR; INTRAVENOUS AS NEEDED
Status: DISCONTINUED | OUTPATIENT
Start: 2023-02-24 | End: 2023-02-24 | Stop reason: HOSPADM

## 2023-02-24 RX ORDER — LIDOCAINE HYDROCHLORIDE 10 MG/ML
0.1 INJECTION, SOLUTION EPIDURAL; INFILTRATION; INTRACAUDAL; PERINEURAL AS NEEDED
Status: DISCONTINUED | OUTPATIENT
Start: 2023-02-24 | End: 2023-02-24 | Stop reason: HOSPADM

## 2023-02-24 RX ORDER — SULFAMETHOXAZOLE AND TRIMETHOPRIM 800; 160 MG/1; MG/1
1 TABLET ORAL 2 TIMES DAILY
Qty: 90 TABLET | Refills: 5 | Status: SHIPPED | OUTPATIENT
Start: 2023-02-24

## 2023-02-24 RX ORDER — METHENAMINE HIPPURATE 1000 MG/1
1 TABLET ORAL 2 TIMES DAILY WITH MEALS
Qty: 60 TABLET | Refills: 5 | Status: SHIPPED | OUTPATIENT
Start: 2023-02-24

## 2023-02-24 RX ORDER — SODIUM CHLORIDE 0.9 % (FLUSH) 0.9 %
5-40 SYRINGE (ML) INJECTION AS NEEDED
Status: DISCONTINUED | OUTPATIENT
Start: 2023-02-24 | End: 2023-02-24 | Stop reason: HOSPADM

## 2023-02-24 RX ORDER — SODIUM CHLORIDE 0.9 % (FLUSH) 0.9 %
5-40 SYRINGE (ML) INJECTION EVERY 8 HOURS
Status: DISCONTINUED | OUTPATIENT
Start: 2023-02-24 | End: 2023-02-24 | Stop reason: HOSPADM

## 2023-02-24 RX ORDER — ONDANSETRON 2 MG/ML
INJECTION INTRAMUSCULAR; INTRAVENOUS AS NEEDED
Status: DISCONTINUED | OUTPATIENT
Start: 2023-02-24 | End: 2023-02-24 | Stop reason: HOSPADM

## 2023-02-24 RX ORDER — HYDROMORPHONE HYDROCHLORIDE 1 MG/ML
0.5 INJECTION, SOLUTION INTRAMUSCULAR; INTRAVENOUS; SUBCUTANEOUS
Status: DISCONTINUED | OUTPATIENT
Start: 2023-02-24 | End: 2023-02-24 | Stop reason: HOSPADM

## 2023-02-24 RX ORDER — SODIUM CHLORIDE, SODIUM LACTATE, POTASSIUM CHLORIDE, CALCIUM CHLORIDE 600; 310; 30; 20 MG/100ML; MG/100ML; MG/100ML; MG/100ML
1000 INJECTION, SOLUTION INTRAVENOUS CONTINUOUS
Status: DISCONTINUED | OUTPATIENT
Start: 2023-02-24 | End: 2023-02-24 | Stop reason: HOSPADM

## 2023-02-24 RX ORDER — OXYCODONE AND ACETAMINOPHEN 5; 325 MG/1; MG/1
1 TABLET ORAL AS NEEDED
Status: DISCONTINUED | OUTPATIENT
Start: 2023-02-24 | End: 2023-02-24 | Stop reason: HOSPADM

## 2023-02-24 RX ORDER — PROPOFOL 10 MG/ML
INJECTION, EMULSION INTRAVENOUS AS NEEDED
Status: DISCONTINUED | OUTPATIENT
Start: 2023-02-24 | End: 2023-02-24 | Stop reason: HOSPADM

## 2023-02-24 RX ORDER — FENTANYL CITRATE 50 UG/ML
50 INJECTION, SOLUTION INTRAMUSCULAR; INTRAVENOUS AS NEEDED
Status: DISCONTINUED | OUTPATIENT
Start: 2023-02-24 | End: 2023-02-24 | Stop reason: HOSPADM

## 2023-02-24 RX ORDER — SODIUM CHLORIDE, SODIUM LACTATE, POTASSIUM CHLORIDE, CALCIUM CHLORIDE 600; 310; 30; 20 MG/100ML; MG/100ML; MG/100ML; MG/100ML
INJECTION, SOLUTION INTRAVENOUS
Status: DISCONTINUED | OUTPATIENT
Start: 2023-02-24 | End: 2023-02-24 | Stop reason: HOSPADM

## 2023-02-24 RX ORDER — FENTANYL CITRATE 50 UG/ML
50 INJECTION, SOLUTION INTRAMUSCULAR; INTRAVENOUS
Status: DISCONTINUED | OUTPATIENT
Start: 2023-02-24 | End: 2023-02-24 | Stop reason: HOSPADM

## 2023-02-24 RX ORDER — ONDANSETRON 2 MG/ML
4 INJECTION INTRAMUSCULAR; INTRAVENOUS AS NEEDED
Status: DISCONTINUED | OUTPATIENT
Start: 2023-02-24 | End: 2023-02-24 | Stop reason: HOSPADM

## 2023-02-24 RX ORDER — HYOSCYAMINE SULFATE 0.12 MG/1
0.12 TABLET, ORALLY DISINTEGRATING ORAL
Qty: 100 TABLET | Refills: 5 | Status: SHIPPED | OUTPATIENT
Start: 2023-02-24

## 2023-02-24 RX ORDER — GLYCOPYRROLATE 0.2 MG/ML
INJECTION INTRAMUSCULAR; INTRAVENOUS AS NEEDED
Status: DISCONTINUED | OUTPATIENT
Start: 2023-02-24 | End: 2023-02-24 | Stop reason: HOSPADM

## 2023-02-24 RX ORDER — DIPHENHYDRAMINE HYDROCHLORIDE 50 MG/ML
12.5 INJECTION, SOLUTION INTRAMUSCULAR; INTRAVENOUS AS NEEDED
Status: DISCONTINUED | OUTPATIENT
Start: 2023-02-24 | End: 2023-02-24 | Stop reason: HOSPADM

## 2023-02-24 RX ORDER — NORETHINDRONE AND ETHINYL ESTRADIOL 0.5-0.035
5 KIT ORAL AS NEEDED
Status: DISCONTINUED | OUTPATIENT
Start: 2023-02-24 | End: 2023-02-24 | Stop reason: HOSPADM

## 2023-02-24 RX ADMIN — FENTANYL CITRATE 50 MCG: 50 INJECTION, SOLUTION INTRAMUSCULAR; INTRAVENOUS at 10:26

## 2023-02-24 RX ADMIN — LIDOCAINE HYDROCHLORIDE 80 MG: 20 INJECTION, SOLUTION EPIDURAL; INFILTRATION; INTRACAUDAL; PERINEURAL at 10:07

## 2023-02-24 RX ADMIN — PHENYLEPHRINE HYDROCHLORIDE 200 MCG: 10 INJECTION INTRAVENOUS at 10:21

## 2023-02-24 RX ADMIN — ONDANSETRON 4 MG: 2 INJECTION INTRAMUSCULAR; INTRAVENOUS at 10:13

## 2023-02-24 RX ADMIN — PROPOFOL 100 MG: 10 INJECTION, EMULSION INTRAVENOUS at 10:07

## 2023-02-24 RX ADMIN — ONABOTULINUMTOXINA 100 UNITS: 100 INJECTION, POWDER, LYOPHILIZED, FOR SOLUTION INTRADERMAL; INTRAMUSCULAR at 10:36

## 2023-02-24 RX ADMIN — DEXAMETHASONE SODIUM PHOSPHATE 4 MG: 4 INJECTION, SOLUTION INTRA-ARTICULAR; INTRALESIONAL; INTRAMUSCULAR; INTRAVENOUS; SOFT TISSUE at 10:13

## 2023-02-24 RX ADMIN — KETOROLAC TROMETHAMINE 15 MG: 30 INJECTION, SOLUTION INTRAMUSCULAR at 10:18

## 2023-02-24 RX ADMIN — CEFAZOLIN SODIUM 2 G: 1 INJECTION, POWDER, FOR SOLUTION INTRAMUSCULAR; INTRAVENOUS at 10:01

## 2023-02-24 RX ADMIN — FENTANYL CITRATE 25 MCG: 50 INJECTION, SOLUTION INTRAMUSCULAR; INTRAVENOUS at 10:03

## 2023-02-24 RX ADMIN — FENTANYL CITRATE 50 MCG: 0.05 INJECTION, SOLUTION INTRAMUSCULAR; INTRAVENOUS at 11:05

## 2023-02-24 RX ADMIN — MEPERIDINE HYDROCHLORIDE 12.5 MG: 25 INJECTION INTRAMUSCULAR; INTRAVENOUS; SUBCUTANEOUS at 11:11

## 2023-02-24 RX ADMIN — OXYCODONE AND ACETAMINOPHEN 1 TABLET: 5; 325 TABLET ORAL at 11:17

## 2023-02-24 RX ADMIN — PROPOFOL 25 MG: 10 INJECTION, EMULSION INTRAVENOUS at 10:29

## 2023-02-24 RX ADMIN — FENTANYL CITRATE 25 MCG: 50 INJECTION, SOLUTION INTRAMUSCULAR; INTRAVENOUS at 10:13

## 2023-02-24 RX ADMIN — SODIUM CHLORIDE, POTASSIUM CHLORIDE, SODIUM LACTATE AND CALCIUM CHLORIDE 1000 ML: 600; 310; 30; 20 INJECTION, SOLUTION INTRAVENOUS at 08:28

## 2023-02-24 RX ADMIN — SODIUM CHLORIDE, POTASSIUM CHLORIDE, SODIUM LACTATE AND CALCIUM CHLORIDE: 600; 310; 30; 20 INJECTION, SOLUTION INTRAVENOUS at 09:55

## 2023-02-24 RX ADMIN — FENTANYL CITRATE 50 MCG: 0.05 INJECTION, SOLUTION INTRAMUSCULAR; INTRAVENOUS at 10:57

## 2023-02-24 RX ADMIN — GLYCOPYRROLATE 0.2 MG: 0.2 INJECTION INTRAMUSCULAR; INTRAVENOUS at 10:21

## 2023-02-24 RX ADMIN — PROPOFOL 50 MG: 10 INJECTION, EMULSION INTRAVENOUS at 10:10

## 2023-02-24 NOTE — PROGRESS NOTES
Pt assisted to br , voided , no bleeding noted , iv dc'd site intact no swelling noted , discharge instructions given to pt and pt's  Zenaida Garcia , both verbalized understanding , no distress noted

## 2023-02-24 NOTE — ANESTHESIA PREPROCEDURE EVALUATION
Relevant Problems   RESPIRATORY SYSTEM   (+) Obstructive sleep apnea syndrome      NEUROLOGY   (+) Seizure disorder (HCC)      CARDIOVASCULAR   (+) Essential hypertension      GASTROINTESTINAL   (+) Cirrhosis of liver (HCC)      ENDOCRINE   (+) Arthritis   (+) Type 2 diabetes mellitus with hyperglycemia, without long-term current use of insulin (HCC)       Anesthetic History   No history of anesthetic complications            Review of Systems / Medical History  Patient summary reviewed and pertinent labs reviewed    Pulmonary    COPD: mild    Sleep apnea: CPAP    Asthma        Neuro/Psych     seizures: well controlled        Comments: +MS Cardiovascular    Hypertension                   GI/Hepatic/Renal           Liver disease     Endo/Other    Diabetes  Hypothyroidism  Arthritis     Other Findings          Past Medical History:   Diagnosis Date    Acid reflux     Arthritis     Asthma     Back pain     Burning with urination     Chronic obstructive pulmonary disease (HCC)     Cirrhosis of liver (HCC)     Cirrhosis of liver ([address]     Diabetes ([address]     Frequent UTI     Headache     Hepatitis C     pt states 20 years ago    Hiatal hernia     Hypertension     Ill-defined condition     varicose veins in throat    Multiple sclerosis (HCC)     Osteopenia     Pancreatitis     pt states 2021    Seizures ([address]     pt states several years ago    Sleep apnea     pt states uses cpap    Thyroid disease     Urinary incontinence        Past Surgical History:   Procedure Laterality Date    HX CATARACT REMOVAL Bilateral     HX CHOLECYSTECTOMY      HX HYSTERECTOMY      HX HYSTERECTOMY      HX LIPECTOMY  05/15/2020    HX ORTHOPAEDIC      wrist surgery     HX OTHER SURGICAL      pannicalectomy    HX TONSILLECTOMY      HX UROLOGICAL  11/30/2022    cysto & cmg    HX UROLOGICAL  12/14/2022    Cysto, CMG, Uroflow, PVR       Current Outpatient Medications   Medication Instructions    albuterol (PROVENTIL HFA, VENTOLIN HFA, PROAIR HFA) 90 mcg/actuation inhaler 2 Puffs, Inhalation, AS NEEDED    aspirin delayed-release 81 mg, Oral, EVERY BEDTIME    baclofen (LIORESAL) 10 mg tablet Take  by mouth.  1/2 tab PRN    cholecalciferol, vitamin D3, (VITAMIN D3 PO) 1 Tablet, Oral, DAILY, 2,000 units    cloNIDine HCL (CATAPRES) 0.1 mg, Oral, DAILY, If BP over 130     colestipoL (COLESTID) 1 g, Oral, 3 TIMES DAILY    estradioL (ESTRACE) 1 g, Vaginal, 3 TIMES A WEEK (MON,WED & FRI)    Ferrex 150 150 mg, Oral, 2 TIMES DAILY    glucose blood VI test strips (Accu-Chek Luanne Plus test strp) strip TEST BLOOD GLUCOSE TWICE DAILY    hydroCHLOROthiazide (MICROZIDE) 12.5 mg capsule 1 Capsule, Oral, DAILY    Janumet XR 50-1,000 mg TM24 TAKE 1 TABLET BY MOUTH TWICE DAILY WITH MEALS    lactase (LACTAID) 3,000 unit tablet 2 Tablets, Oral, 3 TIMES DAILY    levothyroxine (SYNTHROID) 25 mcg tablet TAKE 1 TABLET BY MOUTH DAILY BEFORE BREAKFAST    lipase-protease-amylase (CREON 24,000) 24,000-76,000 -120,000 unit capsule 2 Capsules, Oral, 3 TIMES DAILY WITH MEALS    lisinopriL (PRINIVIL, ZESTRIL) 40 mg tablet 1 Tablet, Oral, DAILY    loperamide (IMODIUM) 2 mg, Oral, 3 TIMES DAILY    montelukast (SINGULAIR) 10 mg tablet 1 Tablet, Oral, DAILY    multivitamin (ONE A DAY) tablet 1 Tablet, Oral, DAILY    nadoloL (CORGARD) 40 mg, Oral, 2 TIMES DAILY    omeprazole (PRILOSEC) 20 mg capsule 1 Capsule, Oral, DAILY    oxybutynin chloride XL (DITROPAN XL) 10 mg, Oral, DAILY    simethicone (MYLICON) 80 mg, EVERY 6 HOURS AS NEEDED    Tecfidera 240 mg cpDR 1 Tablet, Oral, 2 TIMES DAILY       Current Facility-Administered Medications   Medication Dose Route Frequency    lactated Ringers infusion 1,000 mL  1,000 mL IntraVENous CONTINUOUS    ceFAZolin (ANCEF) 2 g in sterile water (preservative free) 20 mL IV syringe  2 g IntraVENous ONCE    onabotulinumtoxinA (BOTOX) injection 100 Units  100 Units IntraVESical ONCE       Patient Vitals for the past 24 hrs:   Temp Pulse Resp BP SpO2   02/24/23 0826 36.5 °C (97.7 °F) (!) 59 20 (!) 143/56 95 %       Lab Results   Component Value Date/Time    WBC 6.7 11/01/2021 12:51 PM    HGB 12.3 11/01/2021 12:51 PM    HCT 36.9 11/01/2021 12:51 PM    PLATELET 964 18/05/4867 12:51 PM    MCV 89.1 11/01/2021 12:51 PM     Lab Results   Component Value Date/Time    Sodium 145 (H) 10/03/2022 08:21 AM    Potassium 4.1 10/03/2022 08:21 AM    Chloride 105 10/03/2022 08:21 AM    CO2 24 10/03/2022 08:21 AM    Anion gap 5 11/01/2021 12:51 PM    Glucose 109 (H) 10/03/2022 08:21 AM    BUN 28 (H) 10/03/2022 08:21 AM    Creatinine 0.91 10/03/2022 08:21 AM    BUN/Creatinine ratio 31 (H) 10/03/2022 08:21 AM    GFR est AA >60 11/01/2021 12:51 PM    GFR est non-AA 54 (L) 11/01/2021 12:51 PM    Calcium 10.2 10/03/2022 08:21 AM     No results found for: APTT, PTP, INR, INREXT  Lab Results   Component Value Date/Time    Glucose 109 (H) 10/03/2022 08:21 AM    Glucose, POC 96 02/24/2023 08:27 AM         Physical Exam    Airway  Mallampati: III  TM Distance: 4 - 6 cm  Neck ROM: normal range of motion   Mouth opening: Normal     Cardiovascular    Rhythm: regular  Rate: normal         Dental    Dentition: Edentulous     Pulmonary      Decreased breath sounds           Abdominal  GI exam deferred       Other Findings            Anesthetic Plan    ASA: 3  Anesthesia type: general    Monitoring Plan: Continuous noninvasive hemodynamic monitoring      Induction: Intravenous  Anesthetic plan and risks discussed with: Patient

## 2023-02-24 NOTE — ANESTHESIA POSTPROCEDURE EVALUATION
Procedure(s):  CYSTOURETHROSCOPY WITH INJECTION OF BOTOX INTO BLADDER, DILATION OF URETHRAL STRICTURE. general    Anesthesia Post Evaluation      Multimodal analgesia: multimodal analgesia used between 6 hours prior to anesthesia start to PACU discharge  Patient location during evaluation: PACU  Patient participation: complete - patient participated  Level of consciousness: awake  Pain score: 0  Pain management: adequate  Airway patency: patent  Anesthetic complications: no  Cardiovascular status: acceptable  Respiratory status: acceptable  Hydration status: acceptable  Post anesthesia nausea and vomiting:  controlled  Final Post Anesthesia Temperature Assessment:  Normothermia (36.0-37.5 degrees C)      INITIAL Post-op Vital signs:   Vitals Value Taken Time   /84 02/24/23 1115   Temp 36.2 °C (97.1 °F) 02/24/23 1122   Pulse 93 02/24/23 1115   Resp 18 02/24/23 1115   SpO2 89 % 02/24/23 1124   Vitals shown include unvalidated device data.

## 2023-02-24 NOTE — OP NOTES
Cystoscopy with Botox injection  Urethral dilation of bladder neck contracture  Cystoscopy with fulguration of bleeders  Preop diagnosis of patient was overactive bladder postop diagnosis with bladder neck contracture most likely interstitial cystitis fibrotic bladder  Surgeon-Reynold  Anesthesia-General  Complication without  EBL less than 10 cc  Assistant-nursing  Indication-patient has recurrent infection she says was urgency frequency that is not responsive to p.o. medication she is set up for evaluation of bladder and treatment with Botox she is aware of the risk of bleeding infection injury to the bladder pulm embolus and death she is aware of alternatives she has no questions  Procedure-patient was prepped and draped in the usual sterile fashion after undergoing general anesthesia placed lithotomy position. Timeout was performed, preoperative antibiotics were given SCDs were applied  Patient was scoped with [employer] cystoscope. Patient is very tight bladder neck once the bladder finely trabeculated but overall woody type. There is not a lot of pliability of the mucosa. I put in Botox 100 units divided in 10 cc normal saline and a fan type arrangement of the half cc increments.   And she started bleeding and it was at the bladder neck so I dilated wire at the level more and then fulgurated the bleeders there and at the end there was no bleeding urine was clear into the bladder put lidocaine per urethra took her off the table to recovery area without complication

## 2023-02-28 ENCOUNTER — OFFICE VISIT (OUTPATIENT)
Dept: ENDOCRINOLOGY | Age: 73
End: 2023-02-28

## 2023-02-28 VITALS
TEMPERATURE: 98.7 F | OXYGEN SATURATION: 95 % | SYSTOLIC BLOOD PRESSURE: 106 MMHG | HEIGHT: 62 IN | DIASTOLIC BLOOD PRESSURE: 43 MMHG | HEART RATE: 80 BPM | WEIGHT: 124 LBS | RESPIRATION RATE: 18 BRPM | BODY MASS INDEX: 22.82 KG/M2

## 2023-02-28 DIAGNOSIS — E55.9 VITAMIN D DEFICIENCY: ICD-10-CM

## 2023-02-28 DIAGNOSIS — E11.65 TYPE 2 DIABETES MELLITUS WITH HYPERGLYCEMIA, UNSPECIFIED WHETHER LONG TERM INSULIN USE (HCC): Primary | ICD-10-CM

## 2023-02-28 DIAGNOSIS — E83.52 HYPERCALCEMIA: ICD-10-CM

## 2023-02-28 DIAGNOSIS — N18.31 CHRONIC KIDNEY DISEASE, STAGE 3A (HCC): ICD-10-CM

## 2023-02-28 NOTE — LETTER
3/4/2023    Patient: Lamar Christine   YOB: 1950   Date of Visit: 2/28/2023     [id number]  Via Fax: [phone]    Dear Sumeet Rodriguez MD,      Thank you for referring Ms. Mona Gonzalez to [address] [place] [address] for evaluation. My notes for this consultation are attached. If you have questions, please do not hesitate to call me. I look forward to following your patient along with you.       Sincerely,    Rikki Branch MD

## 2023-02-28 NOTE — PROGRESS NOTES
James Kathleen is a 81 y.o. female here for   Chief Complaint   Patient presents with    Diabetes    Vitamin D Deficiency       1. Have you been to the ER or an urgent care clinic since your last visit?  - no    2. Have you been hospitalized since your last visit? - yes [facility] botox in bladder     3. Have you seen or consulted any other health care providers outside of the [address] [place] [address] since your last visit?   Include any pap smears or colon screening.- no

## 2023-02-28 NOTE — PROGRESS NOTES
Yared Parsons MD        Patient Information  Date:3/4/2023  Name : Keith Collado 81 y.o.     YOB: 1950         Referred by: Mars Addison MD     Chief Complaint   Patient presents with    Diabetes    Vitamin D Deficiency     History of Present Illness: Keith Collado is a 81 y.o. female here for follow-up of  Type 2 Diabetes Mellitus. 2/28/23  Had bladder procedure on Friday - botox injection  Trying to stay hydrated  No kidney stones      Type 2 Diabetes was diagnosed 2017  . No hypoglycemia  On LT4 25 mcg, had Hashitoxicosis, was on MMI, now hypothyroid      Prior hx   Hyperthyroidism - on MMI, she is back on levothyroxine, says oncology started her back on levothyroxine. TSH was slightly elevated while she was on methimazole and Dr. Dallas De Paz started her back on levothyroxine  Advised patient to call me before she changes any of the thyroid medication in the future. She has been gaining weight now, eating better, she was losing weight when she had hashitoxicosis. She was on methimazole for some time which helped but she self discontinued now.   Cardiovascular risk factors: dyslipidemia, diabetes mellitus   She is off insulin    Her regular weight is 140 pounds      Current exercise: housecleaning, no regular exercise    Wt Readings from Last 3 Encounters:   02/28/23 124 lb (56.2 kg)   02/21/23 123 lb (55.8 kg)   02/14/23 128 lb (58.1 kg)       BP Readings from Last 3 Encounters:   02/28/23 (!) 106/43   02/24/23 (!) 165/80   02/14/23 122/69           Past Medical History:   Diagnosis Date    Acid reflux     Arthritis     Asthma     Back pain     Burning with urination     Chronic obstructive pulmonary disease (HCC)     Cirrhosis of liver (HCC)     Cirrhosis of liver (HCC)     Diabetes (HCC)     Frequent UTI     Headache     Hepatitis C     pt states 20 years ago    Hiatal hernia     Hypertension     Ill-defined condition varicose veins in throat    Multiple sclerosis (HCC)     Osteopenia     Pancreatitis     pt states 2021    Seizures ([address]     pt states several years ago    Sleep apnea     pt states uses cpap    Thyroid disease     Urinary incontinence      Current Outpatient Medications   Medication Sig    cpap machine kit by Does Not Apply route. nightly    methenamine hippurate (HIPREX) 1 gram tablet Take 1 Tablet by mouth two (2) times daily (with meals). hyoscyamine sulfate (CYSTOSPAZ) 0.125 mg rapid dissolve tablet Take 1 Tablet by mouth every four (4) hours as needed for PRN Reason (Other) (oab). trimethoprim-sulfamethoxazole (BACTRIM DS, SEPTRA DS) 160-800 mg per tablet Take 1 Tablet by mouth two (2) times a day. cholecalciferol, vitamin D3, (VITAMIN D3 PO) Take 1 Tablet by mouth daily. 2,000 units    Janumet XR 50-1,000 mg TM24 TAKE 1 TABLET BY MOUTH TWICE DAILY WITH MEALS    levothyroxine (SYNTHROID) 25 mcg tablet TAKE 1 TABLET BY MOUTH DAILY BEFORE BREAKFAST    Ferrex 150 150 mg iron capsule Take 150 mg by mouth two (2) times a day. estradioL (ESTRACE) 0.01 % (0.1 mg/gram) vaginal cream Insert 1 g into vagina every Monday, Wednesday, Friday. oxybutynin chloride XL (DITROPAN XL) 10 mg CR tablet Take 1 Tablet by mouth daily. glucose blood VI test strips (Accu-Chek Luanne Plus test strp) strip TEST BLOOD GLUCOSE TWICE DAILY    lactase (LACTAID) 3,000 unit tablet Take 2 Tablets by mouth three (3) times daily. cloNIDine HCL (CATAPRES) 0.1 mg tablet Take 0.1 mg by mouth daily. If BP over 130    baclofen (LIORESAL) 10 mg tablet Take  by mouth. 1/2 tab PRN    colestipoL (COLESTID) 1 gram tablet Take 1 g by mouth three (3) times daily. lipase-protease-amylase (CREON 24,000) 24,000-76,000 -120,000 unit capsule Take 2 Caps by mouth three (3) times daily (with meals). nadoloL (CORGARD) 20 mg tablet Take 40 mg by mouth two (2) times a day.     albuterol (PROVENTIL HFA, VENTOLIN HFA, PROAIR HFA) 90 mcg/actuation inhaler Take 2 Puffs by inhalation as needed. hydroCHLOROthiazide (MICROZIDE) 12.5 mg capsule Take 1 Cap by mouth daily. lisinopriL (PRINIVIL, ZESTRIL) 40 mg tablet Take 1 Tab by mouth daily. montelukast (SINGULAIR) 10 mg tablet Take 1 Tab by mouth daily. omeprazole (PRILOSEC) 20 mg capsule Take 1 Cap by mouth daily. Tecfidera 240 mg cpDR Take 1 Tab by mouth two (2) times a day. multivitamin (ONE A DAY) tablet Take 1 Tab by mouth daily. simethicone (MYLICON) 80 mg chewable tablet Take 80 mg by mouth every six (6) hours as needed for Flatulence. loperamide (IMODIUM) 2 mg capsule Take 2 mg by mouth three (3) times daily. aspirin delayed-release 81 mg tablet Take 81 mg by mouth nightly. (Patient not taking: Reported on 2/28/2023)     No current facility-administered medications for this visit. Allergies   Allergen Reactions    Iodinated Contrast Media Anaphylaxis    Iodine Anaphylaxis    Gluten Diarrhea    Lactose Diarrhea    Milk Containing Products Diarrhea    Ziprasidone Hcl Unknown (comments)    Ziprasidone Unknown (comments)       Review of Systems: Per HPI    Physical Examination:   Blood pressure (!) 106/43, pulse 80, temperature 98.7 °F (37.1 °C), temperature source Temporal, resp. rate 18, height 5' 2\" (1.575 m), weight 124 lb (56.2 kg), SpO2 95 %. Estimated body mass index is 22.68 kg/m² as calculated from the following:    Height as of this encounter: 5' 2\" (1.575 m). Weight as of this encounter: 124 lb (56.2 kg).   General: pleasant, no distress, good eye contact  HEENT: no exophthalmos, no periorbital edema, EOMI  Neck: No thyromegaly  CVS: S1-S2 regular  RS: Normal respiratory effort  Musculoskeletal: no tremors  Neurological: alert and oriented  Psychiatric: normal mood and affect  Skin: Normal color          Data Reviewed:       Lab Results   Component Value Date/Time    Hemoglobin A1c 5.5 10/03/2022 08:21 AM    Hemoglobin A1c 5.7 (H) 04/27/2022 07:53 AM Hemoglobin A1c 7.3 (H) 08/23/2021 09:35 AM    Hemoglobin A1c, External 6.3 04/29/2015 10:39 AM    Glucose 109 (H) 10/03/2022 08:21 AM    Glucose, POC 96 02/24/2023 08:27 AM    Microalbumin/Creat ratio (mg/g creat) 10 01/23/2020 10:02 AM    Microalb/Creat ratio (ug/mg creat.) 38 (H) 04/27/2022 07:53 AM    Microalbumin,urine random 0.88 01/23/2020 10:02 AM    LDL,Direct 81 04/27/2022 07:53 AM    Creatinine, POC 0.96 02/24/2023 08:27 AM    Creatinine 0.91 10/03/2022 08:21 AM      Lab Results   Component Value Date/Time    GFR est non-AA 54 (L) 11/01/2021 12:51 PM    GFRNA, POC >60 02/25/2022 11:00 AM    GFR est AA >60 11/01/2021 12:51 PM    GFRAA, POC >60 02/25/2022 11:00 AM    Creatinine 0.91 10/03/2022 08:21 AM    Creatinine, POC 0.96 02/24/2023 08:27 AM    BUN 28 (H) 10/03/2022 08:21 AM    Sodium 145 (H) 10/03/2022 08:21 AM    Sodium,  02/24/2023 08:27 AM    Potassium 4.1 10/03/2022 08:21 AM    Potassium, POC 4.1 02/24/2023 08:27 AM    Chloride 105 10/03/2022 08:21 AM    Chloride,  02/24/2023 08:27 AM    CO2 24 10/03/2022 08:21 AM    PTH, Intact 47 10/03/2022 08:21 AM       Assessment/Plan:     1. Type 2 diabetes mellitus with hyperglycemia, unspecified whether long term insulin use ([facility]    2. Hypercalcemia    3. Chronic kidney disease, stage 3a ([facility]          1. Type 2 Diabetes Mellitus  Lab Results   Component Value Date/Time    Hemoglobin A1c 5.5 10/03/2022 08:21 AM    Hemoglobin A1c (POC) 5.3 01/05/2022 01:45 PM    Hemoglobin A1c, External 6.3 04/29/2015 10:39 AM   controlled   Janumet    No history of pancreatitis  Gastroenterologist - Rohit Goff. She was diagnosed with exogenous pancreatic insufficiency, on Creon      2. HTN : Continue current therapy     3. Hyperlipidemia : myalgia, off statin and refused     4. History of hepatitis C    5. Hashitoxicosis - off LT4 2020  NM scan - 45 % diffuse uptake  Off MMI   On LT4 now, biochemically euthyroid      6. MS     7. Hypercalcemia  - fluctuating   - on HCTZ, on calcium   Take only Vit D , stop calcium supplements   No kidney stones , Osteopenia   PTH,Vit D , BMP   Hold hydrochlorothiazide for 6 weeks and recheck labs    8. Anemia - seeing Hematology ,     Patient Instructions   Hold Hydrochlorothiazide for 6 weeks - then have labs checked   Follow-up and Dispositions    Return in about 6 months (around 8/28/2023) for labs in 6 weeks and bnv. Spent > 40 minutes on the day of the visit reviewing chart, examining, ordering/reviewing labs, counseling, discussing therapeutics and documentation in the medical record  Thank you for allowing me to participate in the care of this patient. Isaiah Chery MD      Patient verbalized understanding     Voice-recognition software was used to generate this report, which may result in some phonetic-based errors in the grammar and contents. Even though attempts were made to correct all the mistakes, some may have been missed and remained in the body of the report.

## 2023-04-13 ENCOUNTER — HOSPITAL ENCOUNTER (OUTPATIENT)
Age: 73
Discharge: HOME | End: 2023-04-13
Attending: UROLOGY | Admitting: UROLOGY
Payer: MEDICARE

## 2023-04-13 VITALS
HEIGHT: 62 IN | RESPIRATION RATE: 18 BRPM | OXYGEN SATURATION: 99 % | DIASTOLIC BLOOD PRESSURE: 62 MMHG | TEMPERATURE: 97.9 F | HEART RATE: 86 BPM | WEIGHT: 121 LBS | BODY MASS INDEX: 22.26 KG/M2 | SYSTOLIC BLOOD PRESSURE: 119 MMHG

## 2023-04-13 LAB
ANION GAP BLD CALC-SCNC: 10
CHLORIDE BLD-SCNC: 113 MMOL/L (ref 98–107)
CO2 BLD-SCNC: 21 MMOL/L
GLUCOSE BLD STRIP.AUTO-MCNC: 95 MG/DL (ref 65–100)
POTASSIUM BLD-SCNC: 5.5 MMOL/L (ref 3.5–5.5)
SODIUM BLD-SCNC: 143 MMOL/L (ref 136–145)

## 2023-04-13 PROCEDURE — 76210000021 HC REC RM PH II 0.5 TO 1 HR: Performed by: UROLOGY

## 2023-04-13 PROCEDURE — 76060000032 HC ANESTHESIA 0.5 TO 1 HR: Performed by: UROLOGY

## 2023-04-13 PROCEDURE — 74011250636 HC RX REV CODE- 250/636: Performed by: UROLOGY

## 2023-04-13 PROCEDURE — 80047 BASIC METABLC PNL IONIZED CA: CPT

## 2023-04-13 PROCEDURE — 52260 CYSTOSCOPY AND TREATMENT: CPT | Performed by: UROLOGY

## 2023-04-13 PROCEDURE — 52287 CYSTOSCOPY CHEMODENERVATION: CPT | Performed by: UROLOGY

## 2023-04-13 PROCEDURE — 77030040361 HC SLV COMPR DVT MDII -B: Performed by: UROLOGY

## 2023-04-13 PROCEDURE — 76010000138 HC OR TIME 0.5 TO 1 HR: Performed by: UROLOGY

## 2023-04-13 PROCEDURE — 2709999900 HC NON-CHARGEABLE SUPPLY: Performed by: UROLOGY

## 2023-04-13 PROCEDURE — 74011000250 HC RX REV CODE- 250: Performed by: UROLOGY

## 2023-04-13 RX ORDER — KETOROLAC TROMETHAMINE 10 MG/1
10 TABLET, FILM COATED ORAL
Qty: 20 TABLET | Refills: 1 | Status: SHIPPED | OUTPATIENT
Start: 2023-04-13

## 2023-04-13 RX ORDER — METHENAMINE HIPPURATE 1000 MG/1
1 TABLET ORAL 2 TIMES DAILY WITH MEALS
Qty: 30 TABLET | Refills: 5 | Status: SHIPPED | OUTPATIENT
Start: 2023-04-13

## 2023-04-13 RX ORDER — SULFAMETHOXAZOLE AND TRIMETHOPRIM 800; 160 MG/1; MG/1
1 TABLET ORAL 2 TIMES DAILY
Qty: 10 TABLET | Refills: 3 | Status: SHIPPED | OUTPATIENT
Start: 2023-04-13

## 2023-04-13 RX ORDER — SODIUM CHLORIDE, SODIUM LACTATE, POTASSIUM CHLORIDE, CALCIUM CHLORIDE 600; 310; 30; 20 MG/100ML; MG/100ML; MG/100ML; MG/100ML
20 INJECTION, SOLUTION INTRAVENOUS CONTINUOUS
Status: DISCONTINUED | OUTPATIENT
Start: 2023-04-13 | End: 2023-04-13 | Stop reason: HOSPADM

## 2023-04-13 RX ORDER — LIDOCAINE HYDROCHLORIDE 20 MG/ML
JELLY TOPICAL AS NEEDED
Status: DISCONTINUED | OUTPATIENT
Start: 2023-04-13 | End: 2023-04-13 | Stop reason: HOSPADM

## 2023-04-13 NOTE — PROGRESS NOTES
IV removed-- tip intact, no redness, swelling or bleeding noted. VSS. Patient in no acute distress. DC instructions reviewed with    Sim Bhandari    -- verbalized understanding. Patient wheeled out to private vehicle-- no distress noted.

## 2023-04-13 NOTE — PROGRESS NOTES
IV removed-- tip intact, no redness, swelling or bleeding noted. VSS. Patient in no acute distress. DC instructions reviewed with    Loraine Holstein    -- verbalized understanding. Patient wheeled out to private vehicle-- no distress noted.

## 2023-04-13 NOTE — OP NOTES
Cystoscopy with hydrodistension  Cystoscopy with botox  Catheterization with placement of rescue solution irrigation    Preop dx- oab with MS and interstitial cystitis  Post op dx -same     Surgeon-roe  Anesthesia-MAC  EBL less than 5 cc  Assistant-nursing  Complication-without  Indication-patient with MS overactive bladder as well as interstitial cystitis set up for combination treatment she is aware the risk of bleeding infection injury to the bladder pulm embolus death may not work could make her worse she is aware of alternatives she has no questions  Procedure-patient prepped draped usual sterile fashion after undergoing anesthesia and placed lithotomy position. Timeout was performed. Preoperative antibiotics were given. SCDs were applied   patient was in scope with 21 [employer] cystoscope, filled the bladder 610 cc once into the bladder there petechiae everywhere consistent with interstitial cystitis  Then I used 10 cc of the Botox solution and injected half cc increments in the base of the bladder going from the trigone back. Patient tolerated that well. Emptied the bladder and placed rescue solution into the bladder which is bicarb, heparin, and lidocaine. Put lidocaine per urethra.   Then took her off the table to the recovery area without complication

## 2023-04-13 NOTE — OP NOTES
Cystoscopy with hydrodistension  Cystoscopy with botox  Catheterization with placement of rescue solution irrigation    Preop dx- oab with MS and interstitial cystitis  Post op dx -same     Surgeon-roe  Anesthesia-MAC  EBL less than 5 cc  Assistant-nursing  Complication-without  Indication-patient with MS overactive bladder as well as interstitial cystitis set up for combination treatment she is aware the risk of bleeding infection injury to the bladder pulm embolus death may not work could make her worse she is aware of alternatives she has no questions  Procedure-patient prepped draped usual sterile fashion after undergoing anesthesia and placed lithotomy position. Timeout was performed. Preoperative antibiotics were given. SCDs were applied   patient was in scope with 21 [place] cystoscope, filled the bladder 610 cc once into the bladder there petechiae everywhere consistent with interstitial cystitis  Then I used 10 cc of the Botox solution and injected half cc increments in the base of the bladder going from the trigone back. Patient tolerated that well. Emptied the bladder and placed rescue solution into the bladder which is bicarb, heparin, and lidocaine. Put lidocaine per urethra.   Then took her off the table to the recovery area without complication

## 2023-04-22 DIAGNOSIS — E83.52 HYPERCALCEMIA: ICD-10-CM

## 2023-04-22 DIAGNOSIS — E11.65 TYPE 2 DIABETES MELLITUS WITH HYPERGLYCEMIA, UNSPECIFIED WHETHER LONG TERM INSULIN USE (HCC): Primary | ICD-10-CM

## 2023-04-22 DIAGNOSIS — N18.31 CHRONIC KIDNEY DISEASE, STAGE 3A (HCC): ICD-10-CM

## 2023-04-24 DIAGNOSIS — N18.31 CHRONIC KIDNEY DISEASE, STAGE 3A (HCC): ICD-10-CM

## 2023-04-24 DIAGNOSIS — E83.52 HYPERCALCEMIA: ICD-10-CM

## 2023-04-24 DIAGNOSIS — E11.65 TYPE 2 DIABETES MELLITUS WITH HYPERGLYCEMIA, UNSPECIFIED WHETHER LONG TERM INSULIN USE (HCC): Primary | ICD-10-CM

## 2023-04-28 DIAGNOSIS — E11.65 TYPE 2 DIABETES MELLITUS WITH HYPERGLYCEMIA, UNSPECIFIED WHETHER LONG TERM INSULIN USE (HCC): Primary | ICD-10-CM

## 2023-04-28 DIAGNOSIS — N18.31 CHRONIC KIDNEY DISEASE, STAGE 3A (HCC): ICD-10-CM

## 2023-04-28 DIAGNOSIS — E83.52 HYPERCALCEMIA: ICD-10-CM

## 2023-05-02 PROBLEM — N30.10 INTERSTITIAL CYSTITIS: Status: ACTIVE | Noted: 2023-05-02

## 2023-05-04 ENCOUNTER — OFFICE VISIT (OUTPATIENT)
Dept: UROLOGY | Age: 73
End: 2023-05-04
Payer: MEDICARE

## 2023-05-04 VITALS
SYSTOLIC BLOOD PRESSURE: 132 MMHG | TEMPERATURE: 97.8 F | WEIGHT: 121 LBS | BODY MASS INDEX: 22.26 KG/M2 | HEART RATE: 67 BPM | OXYGEN SATURATION: 98 % | RESPIRATION RATE: 16 BRPM | DIASTOLIC BLOOD PRESSURE: 65 MMHG | HEIGHT: 62 IN

## 2023-05-04 DIAGNOSIS — N39.0 URINARY TRACT INFECTION WITHOUT HEMATURIA, SITE UNSPECIFIED: ICD-10-CM

## 2023-05-04 DIAGNOSIS — N30.90 CYSTITIS: ICD-10-CM

## 2023-05-04 DIAGNOSIS — N39.41 URGE INCONTINENCE: ICD-10-CM

## 2023-05-04 DIAGNOSIS — N95.2 ATROPHIC VAGINITIS: ICD-10-CM

## 2023-05-04 DIAGNOSIS — R32 URINARY INCONTINENCE, UNSPECIFIED TYPE: ICD-10-CM

## 2023-05-04 DIAGNOSIS — N30.10 INTERSTITIAL CYSTITIS: ICD-10-CM

## 2023-05-04 DIAGNOSIS — N39.498 OTHER URINARY INCONTINENCE: Primary | ICD-10-CM

## 2023-05-04 DIAGNOSIS — N39.0 RECURRENT UTI: ICD-10-CM

## 2023-05-04 LAB
BILIRUB UR QL: NEGATIVE
GLUCOSE UR-MCNC: NEGATIVE MG/DL
KETONES P FAST UR STRIP-MCNC: NEGATIVE MG/DL
PH UR STRIP: 6 [PH] (ref 4.6–8)
PROT UR QL STRIP: NEGATIVE
PVR POC: NORMAL CC
SP GR UR STRIP: 1.01 (ref 1–1.03)
UA UROBILINOGEN AMB POC: NORMAL (ref 0.2–1)
URINALYSIS CLARITY POC: CLEAR
URINALYSIS COLOR POC: YELLOW
URINE BLOOD POC: NEGATIVE
URINE LEUKOCYTES POC: NORMAL
URINE NITRITES POC: POSITIVE

## 2023-05-04 PROCEDURE — 51798 US URINE CAPACITY MEASURE: CPT | Performed by: UROLOGY

## 2023-05-04 PROCEDURE — G8420 CALC BMI NORM PARAMETERS: HCPCS | Performed by: UROLOGY

## 2023-05-04 PROCEDURE — G8432 DEP SCR NOT DOC, RNG: HCPCS | Performed by: UROLOGY

## 2023-05-04 PROCEDURE — 1090F PRES/ABSN URINE INCON ASSESS: CPT | Performed by: UROLOGY

## 2023-05-04 PROCEDURE — 99214 OFFICE O/P EST MOD 30 MIN: CPT | Performed by: UROLOGY

## 2023-05-04 PROCEDURE — 3017F COLORECTAL CA SCREEN DOC REV: CPT | Performed by: UROLOGY

## 2023-05-04 PROCEDURE — 1101F PT FALLS ASSESS-DOCD LE1/YR: CPT | Performed by: UROLOGY

## 2023-05-04 PROCEDURE — 1123F ACP DISCUSS/DSCN MKR DOCD: CPT | Performed by: UROLOGY

## 2023-05-04 PROCEDURE — G8400 PT W/DXA NO RESULTS DOC: HCPCS | Performed by: UROLOGY

## 2023-05-04 PROCEDURE — 3075F SYST BP GE 130 - 139MM HG: CPT | Performed by: UROLOGY

## 2023-05-04 PROCEDURE — 81003 URINALYSIS AUTO W/O SCOPE: CPT | Performed by: UROLOGY

## 2023-05-04 PROCEDURE — G8536 NO DOC ELDER MAL SCRN: HCPCS | Performed by: UROLOGY

## 2023-05-04 PROCEDURE — G8427 DOCREV CUR MEDS BY ELIG CLIN: HCPCS | Performed by: UROLOGY

## 2023-05-04 RX ORDER — OXYBUTYNIN CHLORIDE 10 MG/1
10 TABLET, EXTENDED RELEASE ORAL DAILY
Qty: 90 TABLET | Refills: 3 | Status: SHIPPED | OUTPATIENT
Start: 2023-05-04

## 2023-05-07 LAB — BACTERIA UR CULT: ABNORMAL

## 2023-05-08 LAB — BACTERIA UR CULT: ABNORMAL

## 2023-05-11 RX ORDER — CEPHALEXIN 500 MG/1
500 CAPSULE ORAL 3 TIMES DAILY
Qty: 21 CAPSULE | Refills: 0 | Status: SHIPPED | OUTPATIENT
Start: 2023-05-11 | End: 2023-05-18

## 2023-05-12 ENCOUNTER — TELEPHONE (OUTPATIENT)
Age: 73
End: 2023-05-12

## 2023-05-12 RX ORDER — CIPROFLOXACIN 250 MG/1
250 TABLET, FILM COATED ORAL 2 TIMES DAILY
COMMUNITY
Start: 2023-04-21

## 2023-05-12 RX ORDER — NADOLOL 80 MG/1
80 TABLET ORAL DAILY
COMMUNITY
Start: 2023-03-11

## 2023-05-12 RX ORDER — KETOROLAC TROMETHAMINE 10 MG/1
10 TABLET, FILM COATED ORAL EVERY 6 HOURS PRN
COMMUNITY
Start: 2023-04-13

## 2023-06-06 ENCOUNTER — OFFICE VISIT (OUTPATIENT)
Age: 73
End: 2023-06-06
Payer: MEDICARE

## 2023-06-06 VITALS
RESPIRATION RATE: 16 BRPM | DIASTOLIC BLOOD PRESSURE: 64 MMHG | OXYGEN SATURATION: 98 % | HEIGHT: 62 IN | WEIGHT: 121 LBS | BODY MASS INDEX: 22.26 KG/M2 | SYSTOLIC BLOOD PRESSURE: 102 MMHG | TEMPERATURE: 98.4 F | HEART RATE: 70 BPM

## 2023-06-06 DIAGNOSIS — N39.41 URGE INCONTINENCE: ICD-10-CM

## 2023-06-06 DIAGNOSIS — R32 URINARY INCONTINENCE, UNSPECIFIED TYPE: ICD-10-CM

## 2023-06-06 DIAGNOSIS — G35 MULTIPLE SCLEROSIS (HCC): ICD-10-CM

## 2023-06-06 DIAGNOSIS — N30.10 INTERSTITIAL CYSTITIS: Primary | ICD-10-CM

## 2023-06-06 DIAGNOSIS — R33.9 URINARY RETENTION: ICD-10-CM

## 2023-06-06 PROBLEM — J44.9 CHRONIC OBSTRUCTIVE PULMONARY DISEASE (HCC): Status: ACTIVE | Noted: 2017-06-14

## 2023-06-06 PROBLEM — E66.9 OBESITY: Status: ACTIVE | Noted: 2017-06-14

## 2023-06-06 PROBLEM — G43.909 MIGRAINE: Status: ACTIVE | Noted: 2019-10-30

## 2023-06-06 LAB
BILIRUBIN, URINE, POC: NEGATIVE
BILIRUBIN, URINE, POC: NEGATIVE
BLOOD URINE, POC: NEGATIVE
BLOOD URINE, POC: NEGATIVE
GLUCOSE URINE, POC: NEGATIVE
GLUCOSE URINE, POC: NEGATIVE
KETONES, URINE, POC: NEGATIVE
KETONES, URINE, POC: NEGATIVE
LEUKOCYTE ESTERASE, URINE, POC: NEGATIVE
LEUKOCYTE ESTERASE, URINE, POC: NEGATIVE
NITRITE, URINE, POC: NEGATIVE
NITRITE, URINE, POC: NEGATIVE
PH, URINE, POC: 6 (ref 4.6–8)
PH, URINE, POC: 6.5 (ref 4.6–8)
PROTEIN,URINE, POC: NEGATIVE
PROTEIN,URINE, POC: NEGATIVE
PVR, POC: NORMAL CC
SPECIFIC GRAVITY, URINE, POC: 1.01 (ref 1–1.03)
SPECIFIC GRAVITY, URINE, POC: 1.01 (ref 1–1.03)
URINALYSIS CLARITY, POC: CLEAR
URINALYSIS CLARITY, POC: CLEAR
URINALYSIS COLOR, POC: YELLOW
URINALYSIS COLOR, POC: YELLOW
UROBILINOGEN, POC: NORMAL
UROBILINOGEN, POC: NORMAL

## 2023-06-06 PROCEDURE — 3078F DIAST BP <80 MM HG: CPT | Performed by: UROLOGY

## 2023-06-06 PROCEDURE — G8420 CALC BMI NORM PARAMETERS: HCPCS | Performed by: UROLOGY

## 2023-06-06 PROCEDURE — 1123F ACP DISCUSS/DSCN MKR DOCD: CPT | Performed by: UROLOGY

## 2023-06-06 PROCEDURE — 99214 OFFICE O/P EST MOD 30 MIN: CPT | Performed by: UROLOGY

## 2023-06-06 PROCEDURE — 0509F URINE INCON PLAN DOCD: CPT | Performed by: UROLOGY

## 2023-06-06 PROCEDURE — 1090F PRES/ABSN URINE INCON ASSESS: CPT | Performed by: UROLOGY

## 2023-06-06 PROCEDURE — 3074F SYST BP LT 130 MM HG: CPT | Performed by: UROLOGY

## 2023-06-06 PROCEDURE — 51798 US URINE CAPACITY MEASURE: CPT | Performed by: UROLOGY

## 2023-06-06 PROCEDURE — G8427 DOCREV CUR MEDS BY ELIG CLIN: HCPCS | Performed by: UROLOGY

## 2023-06-06 PROCEDURE — 3017F COLORECTAL CA SCREEN DOC REV: CPT | Performed by: UROLOGY

## 2023-06-06 PROCEDURE — G8400 PT W/DXA NO RESULTS DOC: HCPCS | Performed by: UROLOGY

## 2023-06-06 PROCEDURE — 51702 INSERT TEMP BLADDER CATH: CPT | Performed by: UROLOGY

## 2023-06-06 PROCEDURE — 4004F PT TOBACCO SCREEN RCVD TLK: CPT | Performed by: UROLOGY

## 2023-06-06 PROCEDURE — 81003 URINALYSIS AUTO W/O SCOPE: CPT | Performed by: UROLOGY

## 2023-06-06 ASSESSMENT — ENCOUNTER SYMPTOMS
RESPIRATORY NEGATIVE: 1
DIARRHEA: 1

## 2023-06-06 NOTE — PROGRESS NOTES
Chief Complaint   Patient presents with    Follow-up    Enuresis     cystitis        /64   Pulse 70   Temp 98.4 °F (36.9 °C)   Resp 16   Ht 5' 2\" (1.575 m)   Wt 121 lb (54.9 kg)   SpO2 98%   BMI 22.13 kg/m²      PHQ-9 score is    Negative      1. \"Have you been to the ER, urgent care clinic since your last visit? Hospitalized since your last visit? \" No    2. \"Have you seen or consulted any other health care providers outside of the [address] [place] [address] since your last visit? \" No     3. For patients aged 39-70: Has the patient had a colonoscopy / FIT/ Cologuard? Yes - no Care Gap present      If the patient is female:    4. For patients aged 41-77: Has the patient had a mammogram within the past 2 years? Yes - no Care Gap present      5. For patients aged 21-65: Has the patient had a pap smear?  Yes - no Care Gap present
cystitis  She is s/p from 2/23/23  Cystoscopy with Botox injection  Urethral dilation of bladder neck contracture  Cystoscopy with fulguration of bleeders  DX: bladder neck contracture most likely interstitial cystitis fibrotic   bladder    The patient has had  recurrent infection, urgency frequency that is not   responsive to p.o. medication      Orders:  -     AMB POC URINALYSIS DIP STICK AUTO W/O MICRO  -     AMB POC PVR, BILL,POST-VOID RES,US,NON-IMAGING  -     AMB POC URINALYSIS DIP STICK AUTO W/O MICRO  2. Urge incontinence  Overview:  She is s/p from Cystoscopy with hydrodistension  Cystoscopy with botox  Catheterization with placement of rescue solution irrigation from 4/2023   DX: MS and interstitial cystitis  She is s/p from 2/23/23  Cystoscopy with Botox injection  Urethral dilation of bladder neck contracture  Cystoscopy with fulguration of bleeders  DX: bladder neck contracture most likely interstitial cystitis fibrotic   bladder    The patient has had  recurrent infection, urgency frequency that is not   responsive to p.o. medication      Orders:  -     AMB POC URINALYSIS DIP STICK AUTO W/O MICRO  -     AMB POC PVR, BILL,POST-VOID RES,US,NON-IMAGING  -     AMB POC URINALYSIS DIP STICK AUTO W/O MICRO  3. Urinary incontinence, unspecified type  Overview:  She reports bladder prolapse for a few years  She reports urinary frequency almost every 30-40 mins, and 2-3 times at   night. She restricts fluid during the day time  She has a history of UTI, but has no symptoms. Reports getting them every   couple of months. She wears pads, and changes them about 2 times at night. Urine culture  Escherichia coli   She has stress and urge incontinence. She had hysterectomy  No history of breast cancer  She reports using  med for urinary incontinence. She has diarrhea, takes   imodium      Orders:  -     AMB POC URINALYSIS DIP STICK AUTO W/O MICRO  4.  Multiple sclerosis (HCC)  -     AMB POC URINALYSIS DIP STICK

## 2023-06-20 ENCOUNTER — OFFICE VISIT (OUTPATIENT)
Age: 73
End: 2023-06-20
Payer: MEDICARE

## 2023-06-20 VITALS — BODY MASS INDEX: 22.45 KG/M2 | HEIGHT: 62 IN | WEIGHT: 122 LBS

## 2023-06-20 DIAGNOSIS — G35 MULTIPLE SCLEROSIS (HCC): ICD-10-CM

## 2023-06-20 DIAGNOSIS — N30.90 CYSTITIS: Primary | ICD-10-CM

## 2023-06-20 DIAGNOSIS — N30.10 INTERSTITIAL CYSTITIS (CHRONIC) WITHOUT HEMATURIA: ICD-10-CM

## 2023-06-20 DIAGNOSIS — N39.41 URGE INCONTINENCE OF URINE: ICD-10-CM

## 2023-06-20 DIAGNOSIS — N39.41 URGE INCONTINENCE: ICD-10-CM

## 2023-06-20 DIAGNOSIS — R35.0 FREQUENCY OF MICTURITION: ICD-10-CM

## 2023-06-20 DIAGNOSIS — N95.2 POSTMENOPAUSAL ATROPHIC VAGINITIS: ICD-10-CM

## 2023-06-20 LAB
BILIRUBIN, URINE, POC: NEGATIVE
BLOOD URINE, POC: NORMAL
GLUCOSE URINE, POC: NEGATIVE
KETONES, URINE, POC: NEGATIVE
LEUKOCYTE ESTERASE, URINE, POC: NORMAL
NITRITE, URINE, POC: POSITIVE
PH, URINE, POC: 5.5 (ref 4.6–8)
PROTEIN,URINE, POC: NEGATIVE
PVR, POC: ABNORMAL CC
SPECIFIC GRAVITY, URINE, POC: 1.02 (ref 1–1.03)
URINALYSIS CLARITY, POC: NORMAL
URINALYSIS COLOR, POC: YELLOW
UROBILINOGEN, POC: NORMAL

## 2023-06-20 PROCEDURE — 99214 OFFICE O/P EST MOD 30 MIN: CPT | Performed by: UROLOGY

## 2023-06-20 PROCEDURE — G8427 DOCREV CUR MEDS BY ELIG CLIN: HCPCS | Performed by: UROLOGY

## 2023-06-20 PROCEDURE — 3017F COLORECTAL CA SCREEN DOC REV: CPT | Performed by: UROLOGY

## 2023-06-20 PROCEDURE — 1123F ACP DISCUSS/DSCN MKR DOCD: CPT | Performed by: UROLOGY

## 2023-06-20 PROCEDURE — 4004F PT TOBACCO SCREEN RCVD TLK: CPT | Performed by: UROLOGY

## 2023-06-20 PROCEDURE — 0509F URINE INCON PLAN DOCD: CPT | Performed by: UROLOGY

## 2023-06-20 PROCEDURE — 51798 US URINE CAPACITY MEASURE: CPT | Performed by: UROLOGY

## 2023-06-20 PROCEDURE — G8400 PT W/DXA NO RESULTS DOC: HCPCS | Performed by: UROLOGY

## 2023-06-20 PROCEDURE — 81003 URINALYSIS AUTO W/O SCOPE: CPT | Performed by: UROLOGY

## 2023-06-20 PROCEDURE — G8420 CALC BMI NORM PARAMETERS: HCPCS | Performed by: UROLOGY

## 2023-06-20 PROCEDURE — 1090F PRES/ABSN URINE INCON ASSESS: CPT | Performed by: UROLOGY

## 2023-06-20 RX ORDER — HYOSCYAMINE SULFATE EXTENDED-RELEASE 0.38 MG/1
375 TABLET ORAL EVERY 12 HOURS PRN
Qty: 60 TABLET | Refills: 3 | Status: SHIPPED | OUTPATIENT
Start: 2023-06-20

## 2023-06-20 ASSESSMENT — ENCOUNTER SYMPTOMS: DIARRHEA: 1

## 2023-06-20 NOTE — PROGRESS NOTES
HISTORY OF PRESENT ILLNESS  Tori Verma is a 81 y.o. female. Chief Complaint   Patient presents with    Follow-up    Incontinence    Chronic Kidney Disease    Cystitis    Urinary Tract Infection    Urinary Retention     HPI  Patient returns today with some urgency and frequency she is better than she was residuals 100+ cc. She denied fevers chills flank pain nausea vomiting. We will going to start off slow with some hyoscyamine. Hopefully she will not go back to retention as she did on Ditropan       Patient denies the symptoms of COVID-19 per routine screening guidelines. Review of Systems   Constitutional:  Positive for fatigue. Gastrointestinal:  Positive for diarrhea. Genitourinary:  Positive for frequency and urgency. All other systems reviewed and are negative. Past Medical History:  PMHx (including negatives):  has a past medical history of Acid reflux, Arthritis, Asthma, Back pain, Burning with urination, Chronic obstructive pulmonary disease ([address] Cirrhosis of liver ([address] Cirrhosis of liver ([address] Diabetes ([address] Frequent UTI, Headache, Hepatitis C, Hiatal hernia, Hypertension, Ill-defined condition, Multiple sclerosis ([address] Osteopenia, Pancreatitis, Seizures ([address] Sleep apnea, Thyroid disease, and Urinary incontinence. PSurgHx:  has a past surgical history that includes Urological Surgery (02/24/2023); Urological Surgery (02/24/2023); Cataract removal (Bilateral); other surgical history; Hysterectomy; Urological Surgery (11/30/2022); Cholecystectomy; Hysterectomy; Tonsillectomy; lipectomy (05/15/2020); orthopedic surgery; Urological Surgery (02/24/2023); Urological Surgery (12/14/2022); Cystocopy (04/13/2023); and Cystocopy (04/13/2023). PSocHx:  reports that she has never smoked. She has never used smokeless tobacco. She reports that she does not currently use alcohol. She reports that she does not currently use drugs.    [facility]   Physical Exam  Vitals and nursing note

## 2023-06-21 ENCOUNTER — TELEPHONE (OUTPATIENT)
Age: 73
End: 2023-06-21

## 2023-07-26 ENCOUNTER — TELEPHONE (OUTPATIENT)
Age: 73
End: 2023-07-26

## 2023-07-26 NOTE — TELEPHONE ENCOUNTER
Pt left VM stating she had L knee surgery on 7/24 and was told to take half of her Lisinopril. Wants to know if she needs to go back to taking a full tab. Returned call and advised pt I do not see where Dr. Cailin Grullon made that adjustment and that is usually something a PCP or another doc will do. Pt verbalized understanding.  No further questions or concerns at this time ,

## 2023-08-13 NOTE — PROGRESS NOTES
HISTORY OF PRESENT ILLNESS    Dante Citizen is a 81 y.o. female is here for follow up on her urgency and frequency. H/o MS  She is s/p from Cystoscopy with hydrodistension and Cystoscopy with botox on 4/2023. Previously was prescribed oxybutynin but had high residual( up to 300 cc) Last appointment residual was 100, she was put on hyoscyamine and estrace cream    Today her urine  is positive for nitrites and leuk, PVR is 31cc  She is voiding better she is on Ditropan. She is not on antibiotics that she thought she was. She has had 3 infections in the last few weeks she says. She denies fevers chills flank pain nausea vomiting. Symptoms she has urgency frequency and pressure no gross hematuria         Past Medical History:  PMHx (including negatives):  has a past medical history of Acid reflux, Arthritis, Asthma, Back pain, Burning with urination, Chronic obstructive pulmonary disease ([address]), Cirrhosis of liver ([address]), Cirrhosis of liver ([address]), Diabetes ([address]), Frequent UTI, Headache, Hepatitis C, Hiatal hernia, Hypertension, Ill-defined condition, Multiple sclerosis ([address]), Osteopenia, Pancreatitis, Seizures ([address]), Sleep apnea, Thyroid disease, and Urinary incontinence. PSurgHx:  has a past surgical history that includes Urological Surgery (02/24/2023); Urological Surgery (02/24/2023); Cataract removal (Bilateral); other surgical history; Hysterectomy; Urological Surgery (11/30/2022); Cholecystectomy; Hysterectomy; Tonsillectomy; lipectomy (05/15/2020); orthopedic surgery; Urological Surgery (02/24/2023); Urological Surgery (12/14/2022); Cystocopy (04/13/2023); and Cystocopy (04/13/2023). PSocHx:  reports that she has never smoked. She has never used smokeless tobacco. She reports that she does not currently use alcohol. She reports that she does not currently use drugs. [unfilled]     1. Urinary retention  2.  Frequency of micturition  -     AMB POC URINALYSIS DIP STICK AUTO W/O MICRO  -     Culture,

## 2023-08-14 ENCOUNTER — OFFICE VISIT (OUTPATIENT)
Age: 73
End: 2023-08-14
Payer: MEDICARE

## 2023-08-14 VITALS — DIASTOLIC BLOOD PRESSURE: 70 MMHG | HEART RATE: 69 BPM | TEMPERATURE: 97.6 F | SYSTOLIC BLOOD PRESSURE: 132 MMHG

## 2023-08-14 DIAGNOSIS — R33.9 URINARY RETENTION: Primary | ICD-10-CM

## 2023-08-14 DIAGNOSIS — N39.41 URGE INCONTINENCE: ICD-10-CM

## 2023-08-14 DIAGNOSIS — N30.10 INTERSTITIAL CYSTITIS (CHRONIC) WITHOUT HEMATURIA: ICD-10-CM

## 2023-08-14 DIAGNOSIS — N30.90 CYSTITIS: ICD-10-CM

## 2023-08-14 DIAGNOSIS — R32 URINARY INCONTINENCE, UNSPECIFIED TYPE: ICD-10-CM

## 2023-08-14 DIAGNOSIS — N30.00 ACUTE CYSTITIS WITHOUT HEMATURIA: ICD-10-CM

## 2023-08-14 DIAGNOSIS — R35.0 FREQUENCY OF MICTURITION: ICD-10-CM

## 2023-08-14 LAB
BILIRUBIN, URINE, POC: NEGATIVE
BLOOD URINE, POC: NEGATIVE
GLUCOSE URINE, POC: NEGATIVE
KETONES, URINE, POC: NEGATIVE
LEUKOCYTE ESTERASE, URINE, POC: NORMAL
NITRITE, URINE, POC: POSITIVE
PH, URINE, POC: 5.5 (ref 4.6–8)
PROTEIN,URINE, POC: NEGATIVE
PVR, POC: NORMAL CC
SPECIFIC GRAVITY, URINE, POC: 1.01 (ref 1–1.03)
URINALYSIS CLARITY, POC: CLEAR
URINALYSIS COLOR, POC: YELLOW
UROBILINOGEN, POC: NORMAL

## 2023-08-14 PROCEDURE — G8400 PT W/DXA NO RESULTS DOC: HCPCS | Performed by: UROLOGY

## 2023-08-14 PROCEDURE — 4004F PT TOBACCO SCREEN RCVD TLK: CPT | Performed by: UROLOGY

## 2023-08-14 PROCEDURE — 81003 URINALYSIS AUTO W/O SCOPE: CPT | Performed by: UROLOGY

## 2023-08-14 PROCEDURE — 51798 US URINE CAPACITY MEASURE: CPT | Performed by: UROLOGY

## 2023-08-14 PROCEDURE — 1090F PRES/ABSN URINE INCON ASSESS: CPT | Performed by: UROLOGY

## 2023-08-14 PROCEDURE — G8420 CALC BMI NORM PARAMETERS: HCPCS | Performed by: UROLOGY

## 2023-08-14 PROCEDURE — 0509F URINE INCON PLAN DOCD: CPT | Performed by: UROLOGY

## 2023-08-14 PROCEDURE — 1123F ACP DISCUSS/DSCN MKR DOCD: CPT | Performed by: UROLOGY

## 2023-08-14 PROCEDURE — 3075F SYST BP GE 130 - 139MM HG: CPT | Performed by: UROLOGY

## 2023-08-14 PROCEDURE — G8427 DOCREV CUR MEDS BY ELIG CLIN: HCPCS | Performed by: UROLOGY

## 2023-08-14 PROCEDURE — 99214 OFFICE O/P EST MOD 30 MIN: CPT | Performed by: UROLOGY

## 2023-08-14 PROCEDURE — 3078F DIAST BP <80 MM HG: CPT | Performed by: UROLOGY

## 2023-08-14 PROCEDURE — 3017F COLORECTAL CA SCREEN DOC REV: CPT | Performed by: UROLOGY

## 2023-08-14 RX ORDER — NAPROXEN 500 MG/1
TABLET ORAL
COMMUNITY
Start: 2023-07-12

## 2023-08-14 RX ORDER — HYDROMORPHONE HYDROCHLORIDE 2 MG/1
TABLET ORAL
COMMUNITY
Start: 2023-07-24

## 2023-08-14 RX ORDER — CIPROFLOXACIN 250 MG/1
250 TABLET, FILM COATED ORAL 2 TIMES DAILY
Qty: 14 TABLET | Refills: 0 | Status: SHIPPED | OUTPATIENT
Start: 2023-08-14 | End: 2023-08-21

## 2023-08-15 LAB
ALBUMIN/CREAT UR: 30 MG/G CREAT (ref 0–29)
BUN SERPL-MCNC: 25 MG/DL (ref 8–27)
BUN/CREAT SERPL: 23 (ref 12–28)
CALCIUM SERPL-MCNC: 10.2 MG/DL (ref 8.7–10.3)
CHLORIDE SERPL-SCNC: 108 MMOL/L (ref 96–106)
CO2 SERPL-SCNC: 18 MMOL/L (ref 20–29)
CREAT SERPL-MCNC: 1.08 MG/DL (ref 0.57–1)
CREAT UR-MCNC: 61.5 MG/DL
EGFRCR SERPLBLD CKD-EPI 2021: 55 ML/MIN/1.73
EST. AVERAGE GLUCOSE BLD GHB EST-MCNC: 105 MG/DL
GLUCOSE SERPL-MCNC: 104 MG/DL (ref 70–99)
HBA1C MFR BLD: 5.3 % (ref 4.8–5.6)
LDLC SERPL DIRECT ASSAY-MCNC: 87 MG/DL (ref 0–99)
MICROALBUMIN UR-MCNC: 18.4 UG/ML
POTASSIUM SERPL-SCNC: 5.3 MMOL/L (ref 3.5–5.2)
REPORT: NORMAL
SODIUM SERPL-SCNC: 139 MMOL/L (ref 134–144)

## 2023-08-18 LAB — BACTERIA UR CULT: ABNORMAL

## 2023-08-29 ENCOUNTER — OFFICE VISIT (OUTPATIENT)
Age: 73
End: 2023-08-29
Payer: MEDICARE

## 2023-08-29 VITALS
HEIGHT: 62 IN | OXYGEN SATURATION: 99 % | BODY MASS INDEX: 24.84 KG/M2 | TEMPERATURE: 98.4 F | DIASTOLIC BLOOD PRESSURE: 50 MMHG | HEART RATE: 71 BPM | WEIGHT: 135 LBS | RESPIRATION RATE: 20 BRPM | SYSTOLIC BLOOD PRESSURE: 149 MMHG

## 2023-08-29 DIAGNOSIS — E11.65 TYPE 2 DIABETES MELLITUS WITH HYPERGLYCEMIA, UNSPECIFIED WHETHER LONG TERM INSULIN USE (HCC): ICD-10-CM

## 2023-08-29 DIAGNOSIS — E11.65 TYPE 2 DIABETES MELLITUS WITH HYPERGLYCEMIA, WITHOUT LONG-TERM CURRENT USE OF INSULIN (HCC): Primary | ICD-10-CM

## 2023-08-29 DIAGNOSIS — I10 ESSENTIAL HYPERTENSION: ICD-10-CM

## 2023-08-29 DIAGNOSIS — N18.31 CHRONIC KIDNEY DISEASE, STAGE 3A (HCC): ICD-10-CM

## 2023-08-29 DIAGNOSIS — E83.52 HYPERCALCEMIA: ICD-10-CM

## 2023-08-29 PROCEDURE — 3017F COLORECTAL CA SCREEN DOC REV: CPT | Performed by: INTERNAL MEDICINE

## 2023-08-29 PROCEDURE — 1036F TOBACCO NON-USER: CPT | Performed by: INTERNAL MEDICINE

## 2023-08-29 PROCEDURE — 3044F HG A1C LEVEL LT 7.0%: CPT | Performed by: INTERNAL MEDICINE

## 2023-08-29 PROCEDURE — 3077F SYST BP >= 140 MM HG: CPT | Performed by: INTERNAL MEDICINE

## 2023-08-29 PROCEDURE — G8427 DOCREV CUR MEDS BY ELIG CLIN: HCPCS | Performed by: INTERNAL MEDICINE

## 2023-08-29 PROCEDURE — G8400 PT W/DXA NO RESULTS DOC: HCPCS | Performed by: INTERNAL MEDICINE

## 2023-08-29 PROCEDURE — 1090F PRES/ABSN URINE INCON ASSESS: CPT | Performed by: INTERNAL MEDICINE

## 2023-08-29 PROCEDURE — 3078F DIAST BP <80 MM HG: CPT | Performed by: INTERNAL MEDICINE

## 2023-08-29 PROCEDURE — 2022F DILAT RTA XM EVC RTNOPTHY: CPT | Performed by: INTERNAL MEDICINE

## 2023-08-29 PROCEDURE — 1123F ACP DISCUSS/DSCN MKR DOCD: CPT | Performed by: INTERNAL MEDICINE

## 2023-08-29 PROCEDURE — 99214 OFFICE O/P EST MOD 30 MIN: CPT | Performed by: INTERNAL MEDICINE

## 2023-08-29 PROCEDURE — G8420 CALC BMI NORM PARAMETERS: HCPCS | Performed by: INTERNAL MEDICINE

## 2023-08-29 RX ORDER — METHENAMINE HIPPURATE 1000 MG/1
TABLET ORAL
Qty: 60 TABLET | Refills: 1 | Status: SHIPPED | OUTPATIENT
Start: 2023-08-29

## 2023-08-29 NOTE — PROGRESS NOTES
Ronn Dos Santos MD            Patient Information   Date:3/4/2023   Name : Cyndee Soria 81 y.o.       YOB: 1950           Referred by: Yuliya Barker MD         History of Present Illness: Cyndee Soria is a 81 y.o. female here  for follow-up of  Type 2 Diabetes Mellitus diagnosed in 2017. Has frequent UTIs, followed by urology, intermittently on antibiotics  Potassium has been high, was on hydrochlorothiazide before which has been discontinued recently      She had Hashitoxicosis, was on MMI, now hypothyroid         Prior hx    Hyperthyroidism - on MMI, she is back on levothyroxine, says oncology started her back on levothyroxine. TSH was slightly elevated while she was on methimazole and Dr. Damir Garzon started her back on levothyroxine   Advised patient to call me before she changes any of the thyroid medication in the future. She has been gaining weight now, eating better, she was losing weight when she had hashitoxicosis. She was on methimazole for some time which helped but she self discontinued now.    Cardiovascular risk factors: dyslipidemia, diabetes mellitus    She is off insulin      Her regular weight is 140 pounds       Physical Examination:      General: pleasant, no distress, good eye contact    HEENT: no exophthalmos, no periorbital edema, EOMI    Neck: No thyromegaly    CVS: S1-S2 regular    RS: Normal respiratory effort    Musculoskeletal: no tremors    Neurological: alert and oriented    Psychiatric: normal mood and affect    Skin: Normal color               Data Reviewed:             Lab Results   Component Value Date    GFRAA >60 02/25/2022        Lab Results   Component Value Date    LABA1C 5.3 08/14/2023    LABA1C 5.5 10/03/2022    LABA1C 5.7 (H) 04/27/2022         Lab Results   Component Value Date     08/14/2023    K 5.3 (H) 08/14/2023     (H) 08/14/2023    CO2 18 (L) 08/14/2023    BUN 25

## 2023-08-29 NOTE — PROGRESS NOTES
Mario Mason is a 81 y.o. female here for   Chief Complaint   Patient presents with    Diabetes       1. Have you been to the ER, urgent care clinic since your last visit? Hospitalized since your last visit? -7/24/23 [facility] replacement [facility]    2. Have you seen or consulted any other health care providers outside of the [address] [place] [address] Avenue since your last visit?   Include any pap smears or colon screening.-Neuro, Ortho, GI and PT

## 2023-08-30 LAB
COMMENT:: NORMAL
CREAT UR-MCNC: 89.8 MG/DL
EST. AVERAGE GLUCOSE BLD GHB EST-MCNC: 103 MG/DL
HBA1C MFR BLD: 5.2 % (ref 4–5.6)
LDLC SERPL DIRECT ASSAY-MCNC: 82 MG/DL (ref 0–100)
MICROALBUMIN UR-MCNC: 8.16 MG/DL
MICROALBUMIN/CREAT UR-RTO: 91 MG/G (ref 0–30)
POTASSIUM SERPL-SCNC: 4.5 MMOL/L (ref 3.5–5.1)
SPECIMEN HOLD: NORMAL

## 2023-09-19 PROBLEM — N39.0 RECURRENT UTI: Status: ACTIVE | Noted: 2022-11-03

## 2023-09-19 PROBLEM — N39.41 URGE INCONTINENCE: Status: ACTIVE | Noted: 2023-02-24

## 2023-09-19 PROBLEM — R32 URINARY INCONTINENCE: Status: ACTIVE | Noted: 2022-11-03

## 2023-09-19 NOTE — PROGRESS NOTES
back: Normal range of motion. Skin:     Capillary Refill: Capillary refill takes less than 2 seconds. Neurological:      General: No focal deficit present. Mental Status: she is alert. Mental status is at baseline. Psychiatric:         Mood and Affect: Mood normal.         Behavior: Behavior normal.   ASSESSMENT and PLAN  1. Interstitial cystitis (chronic) without hematuria  -     AMB POC URINALYSIS DIP STICK AUTO W/O MICRO  2. Urge incontinence  -     AMB POC URINALYSIS DIP STICK AUTO W/O MICRO  3. Frequency of micturition  -     AMB POC URINALYSIS DIP STICK AUTO W/O MICRO  -     AMB POC PVR, BILL,POST-VOID RES,US,NON-IMAGING  4. Urinary incontinence, unspecified type  -     AMB POC URINALYSIS DIP STICK AUTO W/O MICRO  5. Recurrent UTI  -     AMB POC URINALYSIS DIP STICK AUTO W/O MICRO  6. Diarrhea, unspecified type  7. Essential hypertension    To her family doctor about her high blood pressure, keep on her hyoscyamine see me back in 3 months    Ernestina Grant MD      Please note that portions of this note was potentially completed with Dragon dictation, the computer voice recognition software. Quite often unanticipated grammatical, syntax, homophones, and other interpretive errors are inadvertently transcribed by the computer software. Please disregard these errors. Please excuse any errors that have escaped final proofreading. Thank you.

## 2023-09-25 ENCOUNTER — OFFICE VISIT (OUTPATIENT)
Age: 73
End: 2023-09-25
Payer: MEDICARE

## 2023-09-25 VITALS — HEIGHT: 62 IN | BODY MASS INDEX: 23.92 KG/M2 | WEIGHT: 130 LBS

## 2023-09-25 DIAGNOSIS — R19.7 DIARRHEA, UNSPECIFIED TYPE: ICD-10-CM

## 2023-09-25 DIAGNOSIS — R35.0 FREQUENCY OF MICTURITION: ICD-10-CM

## 2023-09-25 DIAGNOSIS — N39.41 URGE INCONTINENCE: ICD-10-CM

## 2023-09-25 DIAGNOSIS — N30.10 INTERSTITIAL CYSTITIS (CHRONIC) WITHOUT HEMATURIA: Primary | ICD-10-CM

## 2023-09-25 DIAGNOSIS — R32 URINARY INCONTINENCE, UNSPECIFIED TYPE: ICD-10-CM

## 2023-09-25 DIAGNOSIS — I10 ESSENTIAL HYPERTENSION: ICD-10-CM

## 2023-09-25 DIAGNOSIS — N39.0 RECURRENT UTI: ICD-10-CM

## 2023-09-25 LAB — PVR, POC: 15 CC

## 2023-09-25 PROCEDURE — 1090F PRES/ABSN URINE INCON ASSESS: CPT | Performed by: UROLOGY

## 2023-09-25 PROCEDURE — G8400 PT W/DXA NO RESULTS DOC: HCPCS | Performed by: UROLOGY

## 2023-09-25 PROCEDURE — 3017F COLORECTAL CA SCREEN DOC REV: CPT | Performed by: UROLOGY

## 2023-09-25 PROCEDURE — G8420 CALC BMI NORM PARAMETERS: HCPCS | Performed by: UROLOGY

## 2023-09-25 PROCEDURE — 0509F URINE INCON PLAN DOCD: CPT | Performed by: UROLOGY

## 2023-09-25 PROCEDURE — 99214 OFFICE O/P EST MOD 30 MIN: CPT | Performed by: UROLOGY

## 2023-09-25 PROCEDURE — 51798 US URINE CAPACITY MEASURE: CPT | Performed by: UROLOGY

## 2023-09-25 PROCEDURE — 1036F TOBACCO NON-USER: CPT | Performed by: UROLOGY

## 2023-09-25 PROCEDURE — 1123F ACP DISCUSS/DSCN MKR DOCD: CPT | Performed by: UROLOGY

## 2023-09-25 PROCEDURE — G8427 DOCREV CUR MEDS BY ELIG CLIN: HCPCS | Performed by: UROLOGY

## 2023-09-25 ASSESSMENT — ENCOUNTER SYMPTOMS: DIARRHEA: 1

## 2023-11-09 DIAGNOSIS — I10 ESSENTIAL HYPERTENSION: ICD-10-CM

## 2023-11-09 DIAGNOSIS — E11.65 TYPE 2 DIABETES MELLITUS WITH HYPERGLYCEMIA, WITHOUT LONG-TERM CURRENT USE OF INSULIN (HCC): Primary | ICD-10-CM

## 2023-11-09 RX ORDER — BLOOD SUGAR DIAGNOSTIC
STRIP MISCELLANEOUS
Qty: 200 STRIP | Refills: 3 | Status: SHIPPED | OUTPATIENT
Start: 2023-11-09

## 2023-12-27 DIAGNOSIS — I10 ESSENTIAL (PRIMARY) HYPERTENSION: Primary | ICD-10-CM

## 2023-12-27 DIAGNOSIS — E05.80 IATROGENIC HYPERTHYROIDISM: ICD-10-CM

## 2023-12-29 RX ORDER — LEVOTHYROXINE SODIUM 0.03 MG/1
TABLET ORAL
Qty: 90 TABLET | Refills: 3 | Status: SHIPPED | OUTPATIENT
Start: 2023-12-29

## 2023-12-29 NOTE — TELEPHONE ENCOUNTER
Let her know that she has a pinched nerve on the left side and it looks chronic. As far as the muscle baclofen goes, tell her she can take 2 tablets, two to 3 times a day for the muscle cramps. If that dose is helping, then she can call back and I'll send her in a new prescription. 28-Dec-2023

## 2024-01-22 ENCOUNTER — ANESTHESIA EVENT (OUTPATIENT)
Facility: HOSPITAL | Age: 74
End: 2024-01-22
Payer: MEDICARE

## 2024-01-22 ENCOUNTER — ANESTHESIA (OUTPATIENT)
Facility: HOSPITAL | Age: 74
End: 2024-01-22
Payer: MEDICARE

## 2024-01-22 ENCOUNTER — HOSPITAL ENCOUNTER (OUTPATIENT)
Facility: HOSPITAL | Age: 74
Setting detail: OUTPATIENT SURGERY
Discharge: HOME | End: 2024-01-22
Attending: INTERNAL MEDICINE | Admitting: INTERNAL MEDICINE
Payer: MEDICARE

## 2024-01-22 VITALS
TEMPERATURE: 97.6 F | HEART RATE: 74 BPM | OXYGEN SATURATION: 99 % | DIASTOLIC BLOOD PRESSURE: 69 MMHG | RESPIRATION RATE: 18 BRPM | SYSTOLIC BLOOD PRESSURE: 133 MMHG

## 2024-01-22 DIAGNOSIS — K74.60 HEPATIC CIRRHOSIS, UNSPECIFIED HEPATIC CIRRHOSIS TYPE, UNSPECIFIED WHETHER ASCITES PRESENT (HCC): ICD-10-CM

## 2024-01-22 DIAGNOSIS — Z12.11 SCREENING FOR COLON CANCER: ICD-10-CM

## 2024-01-22 DIAGNOSIS — R19.4 CHANGE IN BOWEL HABITS: ICD-10-CM

## 2024-01-22 LAB
GLUCOSE BLD STRIP.AUTO-MCNC: 141 MG/DL (ref 65–100)
PERFORMED BY:: ABNORMAL

## 2024-01-22 PROCEDURE — 2709999900 HC NON-CHARGEABLE SUPPLY: Performed by: INTERNAL MEDICINE

## 2024-01-22 PROCEDURE — 3600007512: Performed by: INTERNAL MEDICINE

## 2024-01-22 PROCEDURE — 2580000003 HC RX 258: Performed by: INTERNAL MEDICINE

## 2024-01-22 PROCEDURE — 6360000002 HC RX W HCPCS: Performed by: NURSE ANESTHETIST, CERTIFIED REGISTERED

## 2024-01-22 PROCEDURE — 7100000011 HC PHASE II RECOVERY - ADDTL 15 MIN: Performed by: INTERNAL MEDICINE

## 2024-01-22 PROCEDURE — 2500000003 HC RX 250 WO HCPCS: Performed by: NURSE ANESTHETIST, CERTIFIED REGISTERED

## 2024-01-22 PROCEDURE — 3700000000 HC ANESTHESIA ATTENDED CARE: Performed by: INTERNAL MEDICINE

## 2024-01-22 PROCEDURE — 3600007502: Performed by: INTERNAL MEDICINE

## 2024-01-22 PROCEDURE — 7100000010 HC PHASE II RECOVERY - FIRST 15 MIN: Performed by: INTERNAL MEDICINE

## 2024-01-22 PROCEDURE — 3700000001 HC ADD 15 MINUTES (ANESTHESIA): Performed by: INTERNAL MEDICINE

## 2024-01-22 PROCEDURE — 88305 TISSUE EXAM BY PATHOLOGIST: CPT

## 2024-01-22 PROCEDURE — 82962 GLUCOSE BLOOD TEST: CPT

## 2024-01-22 RX ORDER — SODIUM CHLORIDE 9 MG/ML
INJECTION, SOLUTION INTRAVENOUS CONTINUOUS
Status: DISCONTINUED | OUTPATIENT
Start: 2024-01-22 | End: 2024-01-22 | Stop reason: HOSPADM

## 2024-01-22 RX ORDER — SODIUM CHLORIDE, SODIUM LACTATE, POTASSIUM CHLORIDE, CALCIUM CHLORIDE 600; 310; 30; 20 MG/100ML; MG/100ML; MG/100ML; MG/100ML
INJECTION, SOLUTION INTRAVENOUS CONTINUOUS
Status: DISCONTINUED | OUTPATIENT
Start: 2024-01-22 | End: 2024-01-22 | Stop reason: HOSPADM

## 2024-01-22 RX ORDER — LIDOCAINE HYDROCHLORIDE 20 MG/ML
INJECTION, SOLUTION EPIDURAL; INFILTRATION; INTRACAUDAL; PERINEURAL PRN
Status: DISCONTINUED | OUTPATIENT
Start: 2024-01-22 | End: 2024-01-22 | Stop reason: SDUPTHER

## 2024-01-22 RX ORDER — GLYCOPYRROLATE 0.2 MG/ML
INJECTION INTRAMUSCULAR; INTRAVENOUS PRN
Status: DISCONTINUED | OUTPATIENT
Start: 2024-01-22 | End: 2024-01-22 | Stop reason: SDUPTHER

## 2024-01-22 RX ORDER — PROPOFOL 10 MG/ML
INJECTION, EMULSION INTRAVENOUS PRN
Status: DISCONTINUED | OUTPATIENT
Start: 2024-01-22 | End: 2024-01-22 | Stop reason: SDUPTHER

## 2024-01-22 RX ADMIN — PROPOFOL 50 MG: 10 INJECTION, EMULSION INTRAVENOUS at 11:00

## 2024-01-22 RX ADMIN — PROPOFOL 60 MG: 10 INJECTION, EMULSION INTRAVENOUS at 10:47

## 2024-01-22 RX ADMIN — LIDOCAINE HYDROCHLORIDE 40 MG: 20 INJECTION, SOLUTION EPIDURAL; INFILTRATION; INTRACAUDAL; PERINEURAL at 10:47

## 2024-01-22 RX ADMIN — PROPOFOL 30 MG: 10 INJECTION, EMULSION INTRAVENOUS at 10:51

## 2024-01-22 RX ADMIN — PROPOFOL 50 MG: 10 INJECTION, EMULSION INTRAVENOUS at 10:59

## 2024-01-22 RX ADMIN — SODIUM CHLORIDE, POTASSIUM CHLORIDE, SODIUM LACTATE AND CALCIUM CHLORIDE: 600; 310; 30; 20 INJECTION, SOLUTION INTRAVENOUS at 10:41

## 2024-01-22 RX ADMIN — GLYCOPYRROLATE 0.1 MG: 0.2 INJECTION INTRAMUSCULAR; INTRAVENOUS at 10:26

## 2024-01-22 ASSESSMENT — PAIN - FUNCTIONAL ASSESSMENT
PAIN_FUNCTIONAL_ASSESSMENT: NONE - DENIES PAIN

## 2024-01-22 NOTE — PERIOP NOTE
Patient alert and oriented x4, VS stable, no complaints of pain at this time.  at bedside. Discharge instructions/education provided to , Rajesh, he verbalized understanding and had no questions. Patient waiting to see Dr. Peres then will be discharged in wheelchair at main entrance of hospital to home via private vehicle.

## 2024-01-22 NOTE — ANESTHESIA PRE PROCEDURE
Department of Anesthesiology  Preprocedure Note       Name:  Regina Oleary   Age:  73 y.o.  :  1950                                          MRN:  [id number]         Date:  2024      Surgeon: Surgeon(s):  Freda Peres MD    Procedure: Procedure(s):  COLONOSCOPY WITH REMOVAL POLYP/BIOPSY    Medications prior to admission:   Prior to Admission medications    Medication Sig Start Date End Date Taking? Authorizing Provider   levothyroxine (SYNTHROID) 25 MCG tablet TAKE 1 TABLET BY MOUTH DAILY BEFORE BREAKFAST 23   Brea Garcia MD   ACCU-CHEK CHARLIE PLUS strip USE TO TEST BLOOD GLUCOSE TWICE DAILY 23   Madelin Caban MD   Misc. Devices (CPAP MACHINE) MISC by Does not apply route    Selin Barros MD   Multiple Vitamin (MULTIVITAMIN ADULT PO) Take by mouth daily    Selin Barros MD   methenamine (HIPREX) 1 g tablet TAKE 1 TABLET BY MOUTH TWICE DAILY WITH MEALS  Patient not taking: Reported on 2023   Jasmyn Colin, APRN - NP   HYDROmorphone (DILAUDID) 2 MG tablet TAKE 1 TABLET BY MOUTH EVERY 4 HOURS AS NEEDED FOR MODERATE TO SEVERE PAIN  Patient not taking: Reported on 2023   Selin Barros MD   mupirocin (BACTROBAN) 2 % ointment APPLY TOPICALLY TO THE AFFECTED AREA 2 TO 3 TIMES DAILY  Patient not taking: Reported on 2023   Selin Barros MD   naproxen (NAPROSYN) 500 MG tablet Take 1 tablet by mouth 2 times daily as needed 23   Selin Barros MD   hyoscyamine (LEVBID) 375 MCG extended release tablet Take 1 tablet by mouth every 12 hours as needed for Cramping  Patient not taking: Reported on 2023   Jon Pineda MD   ketorolac (TORADOL) 10 MG tablet Take 1 tablet by mouth every 6 hours as needed  Patient not taking: Reported on 2023   Selin Barros MD   nadolol (CORGARD) 80 MG tablet Take 0.5 tablets by mouth daily 3/11/23   Selin Barros MD   albuterol

## 2024-01-22 NOTE — ANESTHESIA POSTPROCEDURE EVALUATION
Department of Anesthesiology  Postprocedure Note    Patient: Regina Oleary  MRN: [id number]  YOB: 1950  Date of evaluation: 1/22/2024    Procedure Summary     Date: 01/22/24 Room / Location: [facility] ENDO 03 / [facility]    Anesthesia Start: 1041 Anesthesia Stop: 1114    Procedures:       COLONOSCOPY WITH REMOVAL POLYP/BIOPSY      EGD DIAGNOSTIC ONLY (Upper GI Region) Diagnosis:       Change in bowel habits      Hepatic cirrhosis, unspecified hepatic cirrhosis type, unspecified whether ascites present (HCC)      Screening for colon cancer      (Change in bowel habits [R19.4])      (Hepatic cirrhosis, unspecified hepatic cirrhosis type, unspecified whether ascites present (HCC) [K74.60])      (Screening for colon cancer [Z12.11])    Surgeons: Freda Peres MD Responsible Provider: Alex Lacey Jr., MD    Anesthesia Type: MAC ASA Status: 3          Anesthesia Type: MAC    Brandi Phase I: Brandi Score: 10    Brandi Phase II:      Anesthesia Post Evaluation    Patient location during evaluation: bedside  Patient participation: complete - patient participated  Level of consciousness: sleepy but conscious  Pain score: 0  Airway patency: patent  Nausea & Vomiting: no vomiting and no nausea  Cardiovascular status: blood pressure returned to baseline  Respiratory status: acceptable  Hydration status: stable  Pain management: adequate        No notable events documented.

## 2024-01-30 ENCOUNTER — OFFICE VISIT (OUTPATIENT)
Age: 74
End: 2024-01-30
Payer: MEDICARE

## 2024-01-30 VITALS
OXYGEN SATURATION: 99 % | HEIGHT: 62 IN | SYSTOLIC BLOOD PRESSURE: 138 MMHG | RESPIRATION RATE: 20 BRPM | TEMPERATURE: 98.1 F | DIASTOLIC BLOOD PRESSURE: 51 MMHG | WEIGHT: 122.2 LBS | HEART RATE: 67 BPM | BODY MASS INDEX: 22.49 KG/M2

## 2024-01-30 DIAGNOSIS — E03.9 PRIMARY HYPOTHYROIDISM: ICD-10-CM

## 2024-01-30 DIAGNOSIS — E55.9 VITAMIN D DEFICIENCY: ICD-10-CM

## 2024-01-30 DIAGNOSIS — E11.65 TYPE 2 DIABETES MELLITUS WITH HYPERGLYCEMIA, WITHOUT LONG-TERM CURRENT USE OF INSULIN (HCC): Primary | ICD-10-CM

## 2024-01-30 DIAGNOSIS — E83.52 HYPERCALCEMIA: ICD-10-CM

## 2024-01-30 LAB — HBA1C MFR BLD: 5.8 %

## 2024-01-30 PROCEDURE — G8484 FLU IMMUNIZE NO ADMIN: HCPCS | Performed by: INTERNAL MEDICINE

## 2024-01-30 PROCEDURE — 3017F COLORECTAL CA SCREEN DOC REV: CPT | Performed by: INTERNAL MEDICINE

## 2024-01-30 PROCEDURE — 2022F DILAT RTA XM EVC RTNOPTHY: CPT | Performed by: INTERNAL MEDICINE

## 2024-01-30 PROCEDURE — G8420 CALC BMI NORM PARAMETERS: HCPCS | Performed by: INTERNAL MEDICINE

## 2024-01-30 PROCEDURE — 83036 HEMOGLOBIN GLYCOSYLATED A1C: CPT | Performed by: INTERNAL MEDICINE

## 2024-01-30 PROCEDURE — G8400 PT W/DXA NO RESULTS DOC: HCPCS | Performed by: INTERNAL MEDICINE

## 2024-01-30 PROCEDURE — 3046F HEMOGLOBIN A1C LEVEL >9.0%: CPT | Performed by: INTERNAL MEDICINE

## 2024-01-30 PROCEDURE — G2211 COMPLEX E/M VISIT ADD ON: HCPCS | Performed by: INTERNAL MEDICINE

## 2024-01-30 PROCEDURE — 1090F PRES/ABSN URINE INCON ASSESS: CPT | Performed by: INTERNAL MEDICINE

## 2024-01-30 PROCEDURE — 1036F TOBACCO NON-USER: CPT | Performed by: INTERNAL MEDICINE

## 2024-01-30 PROCEDURE — 1123F ACP DISCUSS/DSCN MKR DOCD: CPT | Performed by: INTERNAL MEDICINE

## 2024-01-30 PROCEDURE — 99215 OFFICE O/P EST HI 40 MIN: CPT | Performed by: INTERNAL MEDICINE

## 2024-01-30 PROCEDURE — 3075F SYST BP GE 130 - 139MM HG: CPT | Performed by: INTERNAL MEDICINE

## 2024-01-30 PROCEDURE — 3078F DIAST BP <80 MM HG: CPT | Performed by: INTERNAL MEDICINE

## 2024-01-30 PROCEDURE — G8427 DOCREV CUR MEDS BY ELIG CLIN: HCPCS | Performed by: INTERNAL MEDICINE

## 2024-01-30 NOTE — PROGRESS NOTES
Regina Oleary is a 73 y.o. female here for   Chief Complaint   Patient presents with    Diabetes       1. Have you been to the ER, urgent care clinic since your last visit?  Hospitalized since your last visit? -  Upper [facility] Endoscopy on 01/22/2024 at [facility]    2. Have you seen or consulted any other health care providers outside of the [facility] since your last visit?  Include any pap smears or colon screening.- no

## 2024-01-30 NOTE — PROGRESS NOTES
[facility] DIABETES AND ENDOCRINOLOGY                 Madelin Caban MD            Patient Information   Date:3/4/2023   Name : Regina Oleary 72 y.o.       YOB: 1950           Referred by: Lloyd Simon MD         History of Present Illness: Regina Oleary is a 72 y.o. female here  for follow-up of  Type 2 Diabetes Mellitus diagnosed in 2017.   Has frequent UTIs, followed by urology, intermittently on antibiotics  Taking levothyroxine consistently  No recent labs    Under a lot of stress, deaths in the family    She had Hashitoxicosis, was on MMI, now hypothyroid         Prior hx    Hyperthyroidism - on MMI, she is back on levothyroxine, says oncology started her back on levothyroxine.  TSH was slightly elevated while she was on methimazole and Dr. Flores started her back on levothyroxine   Advised patient to call me before she changes any of the thyroid medication in the future.       She has been gaining weight now, eating better, she was losing weight when she had hashitoxicosis.   She was on methimazole for some time which helped but she self discontinued now.   Cardiovascular risk factors: dyslipidemia, diabetes mellitus    She is off insulin      Her regular weight is 140 pounds       Physical Examination:      General: pleasant, no distress, good eye contact    HEENT: no exophthalmos, no periorbital edema, EOMI    Neck: No thyromegaly    CVS: S1-S2 regular    RS: Normal respiratory effort    Musculoskeletal: no tremors    Neurological: alert and oriented    Psychiatric: normal mood and affect    Skin: Normal color               Data Reviewed:             Lab Results   Component Value Date    GFRAA >60 02/25/2022        Lab Results   Component Value Date    LABA1C 5.2 08/29/2023    LABA1C 5.3 08/14/2023    LABA1C 5.5 10/03/2022         Lab Results   Component Value Date     08/14/2023    K 4.5 08/29/2023     (H) 08/14/2023    CO2 18 (L) 08/14/2023    BUN 25 08/14/2023

## 2024-03-05 NOTE — LETTER
"[facility] - [facility] Surg  Infectious Disease  Progress Note    Patient Name: Bradley Collins  MRN: [id number]  Admission Date: 2/27/2024  Length of Stay: 7 days  Attending Physician: Chandra Cheema MD  Primary Care Provider: Administration, [facility]    Isolation Status: No active isolations  Assessment/Plan:      ID  * Cellulitis of right lower limb  66M with asthma, allergic rhinitis (on Dupixent, bi-weekly injection), IBS, h/o prostate cancer (prior tx w/ radiation 2020), and recent h/o persistent RLE cellulitis for the past month; which seems to have begun 24h following routine dupixent injections to R lower side/back. Pt has had multiple recent hospital admissions, but RLE cellulitis failing to resolve with abx tx outpt.     Pt now presents to [facility] for worsening RLE redness, swelling, induration of entire L foot/leg/thigh, with spread to groin/perineum and lower pelvis/abdomen over last week. Also with concerns for scrotal swelling, involvement.     CT of Right lower leg/thigh/pelvis: showed severe soft tissue induration throughout right leg, pelvis. With edema to deep fascia, with scrotal edema, and retroperitoneal/extraperitoneal edema along the sidewalls. No abscess or tissue gas seen. Scrotal US with cellulitis, edema. Without fluid collection. US of lower right extremity without evidence of DVT on 2/16, though noting a "slow flow". US of lower right arteries noting no significant stenosis. Normal ABIs.     ID consulted for abx recs of progressive RLE cellulitis on oral abx outpt.     There was concern for potential tiffany's gangrene. Pt was transitioned to daptomycin, linezolid (toxin production inhibitor), and ceftriaxone. Urology and general surgery following. Urology without concern for tiffany's, though recommending scrotal US. General surgery noting no evidence of fourniers. No surgical/urological recommendations regarding persistent swelling.     3/4/24:  RLE essentially unchanged despite abx.  Vasc sx " 6/11/2022    Patient: Peace Self   YOB: 1950   Date of Visit: 6/6/2022     [address] [phone]  Via Fax: [phone]    Dear Ruben Jo MD,      Thank you for referring Ms. Lucinda Perez to [address] [place] [address] for evaluation. My notes for this consultation are attached. If you have questions, please do not hesitate to call me. I look forward to following your patient along with you.       Sincerely,    Turner Allen MD following and rec CT venogram RLE - agree as vascular cause favored over infection.  Stable without systemic sign of infection.  3/5/24: Afebrile and WBC WNL. Creatinine decreasing.  No new changes.  Stable non septic    Recommendations / Plan:  DC daptomycin and ceftriaxone  Start doxycycline 100mg po bid.   Continue scrotal elevation, and judicious leg elevation.   CT RLE when able given renal dysfunction - discussed with radiology to ensure images capture what needs assessment - venous system  Plan reviewed with ID staff. ID will follow with you.                   Anticipated Disposition: tbd    Thank you for your consult. I will follow-up with patient. Please contact us if you have any additional questions.    EZEKIEL Silverio  Infectious Disease  [facility] - Med Surg    Subjective:     Principal Problem:Cellulitis of right lower limb    HPI: 66M with asthma, allergic rhinitis (on Dupixent, bi-weekly injection), IBS, h/o prostate cancer (prior tx w/ radiation 2020), and recent h/o persistent RLE cellulitis requiring multiple recent hospital admissions, failing to resolve with abx tx.     [facility]; which apparently began 1-2 days following routine bi-weekly injection of Dupixent to Left thigh. Ct-leg unremarkable for deeper infection then. Given cephalexin, but went to [facility] (02/16) for worsening RLE cellulitis, noted to have significant DALIA (Cr 2.0; baseline 1.1?). Given zosyn, de-escalated to CTX inpatient. Erythema improved but edema persisted. He had induration of the thigh. CT showed diffuse cutaneous thickening and edema with prominent right inguinal lymph nodes. IV-CTX switched to oral augmentin and doxycycline on 2/19/2024. Redness and Edema improved; but induration persisted. He was discharged home on doxy / augmentin for 14d, HERNANDO 03/01.     Pt seen for f/u at [facility] 02/27; worsening redness/ swelling with spread up to his  abdomen. He had edema and erythema of the entire right lower extremity, right groin, and right lower abdomen. It was tender and warm.  Thus, pt re-admitted to [facility] ([facility]) for worsening RLE cellulitis and ongoing DALIA (Cr 1.8).     Pt arrived AF, VSS, HDS, wbc nml, started on empiric Iv-zosyn / vanc.    Non-contrast CT showed soft tissue induration throughout the right anterior pelvis, thigh,and leg, skin thickening and edema of the subcutaneous tissue, superficial fascia, and deep fascia, scrotal edema, and retroperitoneal/extraperitoneal edema along the sidewalls.  ID consulted for abx recs of progressive RLE cellulitis on oral abx.           Interval History:   No acute events.   Afebrile and WBC WNL.  Leg edema and redness essentially unchanged.  Denies pain at rest except some pins and needles at night  CRP improved.   The patient denies any recent fever, chills, or sweats.      Review of Systems   Constitutional:  Negative for chills, diaphoresis and fever.   Respiratory:  Negative for shortness of breath.    Cardiovascular:  Positive for leg swelling (RLE). Negative for chest pain.   Gastrointestinal:  Negative for abdominal pain, diarrhea, nausea and vomiting.   Genitourinary:  Positive for penile swelling and scrotal swelling. Negative for dysuria and hematuria.     Objective:     Vital Signs (Most Recent):  Temp: 98.3 °F (36.8 °C) (03/05/24 1306)  Pulse: 82 (03/05/24 1306)  Resp: 17 (03/05/24 1306)  BP: (!) 171/88 (03/05/24 1306)  SpO2: 99 % (03/05/24 1306) Vital Signs (24h Range):  Temp:  [98.1 °F (36.7 °C)-99 °F (37.2 °C)] 98.3 °F (36.8 °C)  Pulse:  [74-82] 82  Resp:  [16-18] 17  SpO2:  [97 %-99 %] 99 %  BP: (145-171)/(79-91) 171/88     Weight: 83.5 kg (184 lb 1.4 oz)  Body mass index is 28.83 kg/m².    Estimated Creatinine Clearance: 51.4 mL/min (A) (based on SCr of 1.5 mg/dL (H)).     Physical Exam  Constitutional:       General: He is not in acute distress.     Appearance: Normal appearance. He is  "well-developed. He is not ill-appearing, toxic-appearing or diaphoretic.   HENT:      Head: Normocephalic and atraumatic.   Cardiovascular:      Rate and Rhythm: Normal rate and regular rhythm.      Heart sounds: Normal heart sounds. No murmur heard.     No friction rub. No gallop.   Pulmonary:      Effort: Pulmonary effort is normal. No respiratory distress.      Breath sounds: Normal breath sounds. No wheezing or rales.   Abdominal:      General: Bowel sounds are normal. There is no distension.      Palpations: Abdomen is soft. There is no mass.      Tenderness: There is no abdominal tenderness. There is no guarding or rebound.   Musculoskeletal:      Right lower leg: Edema (with slight rubor and warmth) present.      Left lower leg: No edema.   Skin:     General: Skin is warm and dry.   Neurological:      Mental Status: He is alert and oriented to person, place, and time.   Psychiatric:         Behavior: Behavior normal.          Significant Labs: Blood Culture:   Recent Labs   Lab 02/16/24  1843 02/16/24  1919   LABBLOO No growth after 5 days. No growth after 5 days.       CBC:   Recent Labs   Lab 03/04/24  0411 03/05/24  0359   WBC 8.83 8.76   HGB 10.4* 10.4*   HCT 31.2* 31.0*    353       CMP:   Recent Labs   Lab 03/04/24  0411 03/05/24  0359    136   K 4.0 4.3    104   CO2 24 22*   GLU 83 87   BUN 16 16   CREATININE 1.6* 1.5*   CALCIUM 8.8 9.3   ANIONGAP 9 10       Wound Culture: No results for input(s): "LABAERO" in the last 4320 hours.  All pertinent labs within the past 24 hours have been reviewed.    Significant Imaging: I have reviewed all pertinent imaging results/findings within the past 24 hours.  Procedure Component Value Units Date/Time   US Retroperitoneal Complete [[id number]] Resulted: 03/04/24 0055   Order Status: Completed Updated: 03/04/24 0058   Narrative:     EXAMINATION:  US RETROPERITONEAL COMPLETE    CLINICAL HISTORY:  DALIA;    TECHNIQUE:  Ultrasound of the kidneys and " urinary bladder was performed including color flow and Doppler evaluation of the kidneys.    COMPARISON:  None.    FINDINGS:  Right kidney: The right kidney measures 11.2 cm. No cortical thinning. No loss of corticomedullary distinction. Resistive index measures 0.61.  Multiple simple appearing renal cysts, with the largest measuring up to 2.1 cm in size.  No mass. No renal stone. No hydronephrosis.    Left kidney: The left kidney measures 11.0 cm. No cortical thinning. No loss of corticomedullary distinction. Resistive index measures 0.69.  Multiple simple appearing renal cysts, with the largest measuring up to 2.3 cm in size.  No mass. No renal stone. No hydronephrosis.    The bladder is partially distended at the time of scanning and has an unremarkable appearance.   Impression:       No hydronephrosis.    Bilateral simple appearing renal cysts.      Electronically signed by: Tee Silverman MD  Date: 03/04/2024  Time: 00:55   US Scrotum And Testicles [[id number]] Resulted: 02/29/24 0816   Order Status: Completed Updated: 02/29/24 0819   Narrative:     EXAMINATION:  US SCROTUM AND TESTICLES    CLINICAL HISTORY:  scrotal swelling;    TECHNIQUE:  Sonography of the scrotum and testes.    COMPARISON:  None.    FINDINGS:  Right Testicle:    *Size: 4.2 x 2.9 x 2.9 cm  *Appearance: Normal.  *Flow: Normal arterial and venous flow  *Epididymis: Normal.  *Hydrocele: None.  *Varicocele: None.  .    Left Testicle:    *Size: 4.6 x 3.0 x 2.7 cm  *Appearance: Normal.  *Flow: Normal arterial and venous flow  *Epididymis: Normal.  *Hydrocele: None.  *Varicocele: None.  .    Other findings: Diffuse thickening throughout the scrotal wall with increased perfusion which may be seen with cellulitis or edema.  No discrete fluid collection.   Impression:       Diffuse scrotal wall thickening with increased perfusion which may be seen with cellulitis or edema.  No evidence of fluid collection.  No intratesticular  abnormality.      Electronically signed by: Pastor Taveras MD  Date: 02/29/2024  Time: 08:16   CT Leg (Tibia-Fibula) Without Contrast Right [[id number]] Resulted: 02/27/24 1613   Order Status: Completed Updated: 02/27/24 1616   Narrative:     EXAMINATION:  CT PELVIS WITHOUT CONTRAST; CT THIGH WITHOUT CONTRAST RIGHT; CT LEG (TIBIA-FIBULA) WITHOUT CONTRAST RIGHT    CLINICAL HISTORY:  Soft tissue infection suspected;    TECHNIQUE:  CT of pelvis, right thigh, and right leg performed without contrast.  Coronal and sagittal reformats provided.    COMPARISON:  CT dated 02/18/2024    FINDINGS:  There is severe soft tissue induration throughout the right thigh and leg as well as the anterior pelvis.  There is skin thickening with subcutaneous, superficial fascial, and deep fascial edema.  There is skin thickening and subcutaneous edema of the leg.  There is no soft tissue gas.  There is no evidence for confluent fluid collection, noting somewhat limited noncontrast assessment.    Note made of scrotal edema.    No pelvic ascites.  Mild retroperitoneal/extraperitoneal edema noted along the sidewalls.  Metallic bodies within the prostate, presumably fiducial markers.    No fracture.  No lytic or blastic lesion.  Degenerative changes are seen in the lower lumbar spine.  There is a small knee effusion.   Impression:       Extensive soft tissue induration throughout the right thigh and leg as well as anterior pelvis.  Deep fascial edema noted at the level of the thigh.  No evidence for soft tissue gas.  Diagnostic considerations include infectious process such as cellulitis, myositis, and fasciitis.  Alternatively, appearance may be seen with inflammatory process, venous/lymphatic obstruction, or drug/allergic reaction.      Electronically signed by: Javier Rutherford MD  Date: 02/27/2024  Time: 16:13   CT Thigh Without Contrast Right [[id number]] Resulted: 02/27/24 1613   Order Status: Completed Updated: 02/27/24 1616   Narrative:      EXAMINATION:  CT PELVIS WITHOUT CONTRAST; CT THIGH WITHOUT CONTRAST RIGHT; CT LEG (TIBIA-FIBULA) WITHOUT CONTRAST RIGHT    CLINICAL HISTORY:  Soft tissue infection suspected;    TECHNIQUE:  CT of pelvis, right thigh, and right leg performed without contrast.  Coronal and sagittal reformats provided.    COMPARISON:  CT dated 02/18/2024    FINDINGS:  There is severe soft tissue induration throughout the right thigh and leg as well as the anterior pelvis.  There is skin thickening with subcutaneous, superficial fascial, and deep fascial edema.  There is skin thickening and subcutaneous edema of the leg.  There is no soft tissue gas.  There is no evidence for confluent fluid collection, noting somewhat limited noncontrast assessment.    Note made of scrotal edema.    No pelvic ascites.  Mild retroperitoneal/extraperitoneal edema noted along the sidewalls.  Metallic bodies within the prostate, presumably fiducial markers.    No fracture.  No lytic or blastic lesion.  Degenerative changes are seen in the lower lumbar spine.  There is a small knee effusion.   Impression:       Extensive soft tissue induration throughout the right thigh and leg as well as anterior pelvis.  Deep fascial edema noted at the level of the thigh.  No evidence for soft tissue gas.  Diagnostic considerations include infectious process such as cellulitis, myositis, and fasciitis.  Alternatively, appearance may be seen with inflammatory process, venous/lymphatic obstruction, or drug/allergic reaction.      Electronically signed by: Javier Rutherford MD  Date: 02/27/2024  Time: 16:13   CT Pelvis Without Contrast [[id number]] Resulted: 02/27/24 1613   Order Status: Completed Updated: 02/27/24 1616   Narrative:     EXAMINATION:  CT PELVIS WITHOUT CONTRAST; CT THIGH WITHOUT CONTRAST RIGHT; CT LEG (TIBIA-FIBULA) WITHOUT CONTRAST RIGHT    CLINICAL HISTORY:  Soft tissue infection suspected;    TECHNIQUE:  CT of pelvis, right thigh, and right leg performed  without contrast.  Coronal and sagittal reformats provided.    COMPARISON:  CT dated 02/18/2024    FINDINGS:  There is severe soft tissue induration throughout the right thigh and leg as well as the anterior pelvis.  There is skin thickening with subcutaneous, superficial fascial, and deep fascial edema.  There is skin thickening and subcutaneous edema of the leg.  There is no soft tissue gas.  There is no evidence for confluent fluid collection, noting somewhat limited noncontrast assessment.    Note made of scrotal edema.    No pelvic ascites.  Mild retroperitoneal/extraperitoneal edema noted along the sidewalls.  Metallic bodies within the prostate, presumably fiducial markers.    No fracture.  No lytic or blastic lesion.  Degenerative changes are seen in the lower lumbar spine.  There is a small knee effusion.   Impression:       Extensive soft tissue induration throughout the right thigh and leg as well as anterior pelvis.  Deep fascial edema noted at the level of the thigh.  No evidence for soft tissue gas.  Diagnostic considerations include infectious process such as cellulitis, myositis, and fasciitis.  Alternatively, appearance may be seen with inflammatory process, venous/lymphatic obstruction, or drug/allergic reaction.      Electronically signed by: Javier Rutherford MD  Date: 02/27/2024  Time: 16:13     Imaging History    2024    Date Procedure Name Study Review Link PACS Link Status Accession Number Location   03/03/24 10:57 PM US Retroperitoneal Complete Study Review  Images Final [id number] [facility]   02/29/24 07:58 AM US Scrotum And Testicles Study Review  Images Final [id number] [facility]   02/27/24 03:55 PM CT Thigh Without Contrast Right Study Review  Images Final [id number] [facility]   02/27/24 03:55 PM CT Leg (Tibia-Fibula) Without Contrast Right Study Review  Images Final [id number] [facility]   02/27/24 03:54 PM CT Pelvis Without Contrast Study Review  Images Final [id number] [facility]   02/20/24 11:45 AM US Lower Extrem  Arteries Right with JOSE CARLOS (xpd) Study Review  Images Final [id number] [facility]   02/18/24 02:02 PM CT Leg (Tibia-Fibula) Wtih Contrast Right Study Review  Images Final [id number] [facility]   02/18/24 02:02 PM CT Thigh With Contrast Right Study Review  Images Final [id number] [facility]   02/16/24 08:06 PM US Lower Extremity Veins Right Study Review  Images Final [id number] [facility]   02/16/24 12:00 AM CARDIAC MONITORING STRIPS Study Review  Final

## 2024-03-25 ENCOUNTER — OFFICE VISIT (OUTPATIENT)
Age: 74
End: 2024-03-25
Payer: MEDICARE

## 2024-03-25 VITALS
DIASTOLIC BLOOD PRESSURE: 68 MMHG | BODY MASS INDEX: 23 KG/M2 | WEIGHT: 125 LBS | HEIGHT: 62 IN | SYSTOLIC BLOOD PRESSURE: 113 MMHG | HEART RATE: 71 BPM

## 2024-03-25 DIAGNOSIS — R35.0 FREQUENCY OF MICTURITION: ICD-10-CM

## 2024-03-25 DIAGNOSIS — N20.0 KIDNEY STONE: ICD-10-CM

## 2024-03-25 DIAGNOSIS — N30.10 INTERSTITIAL CYSTITIS (CHRONIC) WITHOUT HEMATURIA: ICD-10-CM

## 2024-03-25 DIAGNOSIS — N39.0 RECURRENT UTI: Primary | ICD-10-CM

## 2024-03-25 DIAGNOSIS — N30.00 ACUTE CYSTITIS WITHOUT HEMATURIA: ICD-10-CM

## 2024-03-25 DIAGNOSIS — N39.41 URGE INCONTINENCE: ICD-10-CM

## 2024-03-25 DIAGNOSIS — E11.65 TYPE 2 DIABETES MELLITUS WITH HYPERGLYCEMIA, WITHOUT LONG-TERM CURRENT USE OF INSULIN (HCC): ICD-10-CM

## 2024-03-25 LAB
BILIRUBIN, URINE, POC: NEGATIVE
BLOOD URINE, POC: NEGATIVE
GLUCOSE URINE, POC: NEGATIVE
KETONES, URINE, POC: NEGATIVE
LEUKOCYTE ESTERASE, URINE, POC: ABNORMAL
NITRITE, URINE, POC: POSITIVE
PH, URINE, POC: 5.5 (ref 4.6–8)
PROTEIN,URINE, POC: NEGATIVE
SPECIFIC GRAVITY, URINE, POC: 1.01 (ref 1–1.03)
URINALYSIS CLARITY, POC: CLEAR
URINALYSIS COLOR, POC: YELLOW
UROBILINOGEN, POC: ABNORMAL

## 2024-03-25 PROCEDURE — 2022F DILAT RTA XM EVC RTNOPTHY: CPT | Performed by: UROLOGY

## 2024-03-25 PROCEDURE — G8400 PT W/DXA NO RESULTS DOC: HCPCS | Performed by: UROLOGY

## 2024-03-25 PROCEDURE — 3074F SYST BP LT 130 MM HG: CPT | Performed by: UROLOGY

## 2024-03-25 PROCEDURE — 1036F TOBACCO NON-USER: CPT | Performed by: UROLOGY

## 2024-03-25 PROCEDURE — G8420 CALC BMI NORM PARAMETERS: HCPCS | Performed by: UROLOGY

## 2024-03-25 PROCEDURE — 3078F DIAST BP <80 MM HG: CPT | Performed by: UROLOGY

## 2024-03-25 PROCEDURE — 1123F ACP DISCUSS/DSCN MKR DOCD: CPT | Performed by: UROLOGY

## 2024-03-25 PROCEDURE — 0509F URINE INCON PLAN DOCD: CPT | Performed by: UROLOGY

## 2024-03-25 PROCEDURE — 99214 OFFICE O/P EST MOD 30 MIN: CPT | Performed by: UROLOGY

## 2024-03-25 PROCEDURE — G8484 FLU IMMUNIZE NO ADMIN: HCPCS | Performed by: UROLOGY

## 2024-03-25 PROCEDURE — 81003 URINALYSIS AUTO W/O SCOPE: CPT | Performed by: UROLOGY

## 2024-03-25 PROCEDURE — 3046F HEMOGLOBIN A1C LEVEL >9.0%: CPT | Performed by: UROLOGY

## 2024-03-25 PROCEDURE — 3017F COLORECTAL CA SCREEN DOC REV: CPT | Performed by: UROLOGY

## 2024-03-25 PROCEDURE — G8427 DOCREV CUR MEDS BY ELIG CLIN: HCPCS | Performed by: UROLOGY

## 2024-03-25 PROCEDURE — 1090F PRES/ABSN URINE INCON ASSESS: CPT | Performed by: UROLOGY

## 2024-03-25 RX ORDER — SULFAMETHOXAZOLE AND TRIMETHOPRIM 800; 160 MG/1; MG/1
1 TABLET ORAL 2 TIMES DAILY
Qty: 40 TABLET | Refills: 5 | Status: SHIPPED | OUTPATIENT
Start: 2024-03-25 | End: 2024-04-24

## 2024-03-25 NOTE — PROGRESS NOTES
HISTORY OF PRESENT ILLNESS  Regina Oleary is a 73 y.o. female   Patient states now she is having persistent UTIs a 70 years of antibiotics to complete back is urgency frequency and burning.  Her urine has pus cells and today.  She has a small stone right kidney 2 years ago I will repeat a CT scan to make sure is not GI  fistula and she does not have a change in her stones.  I will put her on Bactrim suppression warned the possible side effects and see her back in about a month to 6 weeks  1. Recurrent UTI  Overview:  Last urine culture 8/14/23: E.coli - 25,000-50,000 colonies. Treated with Cipro.    CT scan on 11/2022  -Punctate nonobstructing right renal stone. No hydronephrosis.   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    Orders:  -     AMB POC URINALYSIS DIP STICK AUTO W/O MICRO  -     Culture, Urine  -     CT ABDOMEN PELVIS WO CONTRAST Additional Contrast? Radiologist Recommendation  -     sulfamethoxazole-trimethoprim (BACTRIM DS;SEPTRA DS) 800-160 MG per tablet; Take 1 tablet by mouth 2 times daily 1 p.o. twice daily then 1 nightly, stay on the antibiotic suppression till I see her again, Disp-40 tablet, R-5Normal  2. Interstitial cystitis (chronic) without hematuria  Overview:  Procedures:   4/13/23: Cystoscopy with hydrodistension, Botox injection  2/23/23: Cystoscopy with fulguration of bleeders, Urethral dilation, Botox injection  She is was/is On hyoscyamine.   3. Acute cystitis without hematuria  -     sulfamethoxazole-trimethoprim (BACTRIM DS;SEPTRA DS) 800-160 MG per tablet; Take 1 tablet by mouth 2 times daily 1 p.o. twice daily then 1 nightly, stay on the antibiotic suppression till I see her again, Disp-40 tablet, R-5Normal  4. Frequency of micturition  Overview:  Component 8/14/23 0928 6/20/23 0908 6/6/23 1208   PVR, POC 31ml  116ml  194ml CM     5. Urge incontinence  -     sulfamethoxazole-trimethoprim (BACTRIM DS;SEPTRA DS) 800-160 MG per tablet; Take 1

## 2024-03-26 ENCOUNTER — TELEPHONE (OUTPATIENT)
Age: 74
End: 2024-03-26

## 2024-03-26 NOTE — TELEPHONE ENCOUNTER
[facility] called, urine culture not completed for patient because there was no name on the tube.  I did contact [facility] to see if there was anything we could do. Unfortunately no.

## 2024-03-28 NOTE — TELEPHONE ENCOUNTER
I contacted patient, she is wityh mothernlaw whos having surgery and will drop off another urine when she has time.

## 2024-03-29 ENCOUNTER — NURSE ONLY (OUTPATIENT)
Age: 74
End: 2024-03-29
Payer: MEDICARE

## 2024-03-29 DIAGNOSIS — N39.0 URINARY TRACT INFECTION WITH HEMATURIA, SITE UNSPECIFIED: Primary | ICD-10-CM

## 2024-03-29 DIAGNOSIS — R31.9 URINARY TRACT INFECTION WITH HEMATURIA, SITE UNSPECIFIED: Primary | ICD-10-CM

## 2024-03-29 LAB
BILIRUBIN, URINE, POC: NEGATIVE
BLOOD URINE, POC: NORMAL
GLUCOSE URINE, POC: NEGATIVE
KETONES, URINE, POC: NEGATIVE
LEUKOCYTE ESTERASE, URINE, POC: NORMAL
NITRITE, URINE, POC: NEGATIVE
PH, URINE, POC: 5.5 (ref 4.6–8)
PROTEIN,URINE, POC: NEGATIVE
SPECIFIC GRAVITY, URINE, POC: 1.01 (ref 1–1.03)
URINALYSIS CLARITY, POC: CLEAR
URINALYSIS COLOR, POC: YELLOW
UROBILINOGEN, POC: NORMAL

## 2024-03-29 PROCEDURE — 81003 URINALYSIS AUTO W/O SCOPE: CPT | Performed by: UROLOGY

## 2024-04-01 ENCOUNTER — TELEPHONE (OUTPATIENT)
Age: 74
End: 2024-04-01

## 2024-04-02 LAB — BACTERIA UR CULT: ABNORMAL

## 2024-04-02 NOTE — TELEPHONE ENCOUNTER
I have preliminary results for urine culture, no sensitivity report. As  soon as sensitivity report is back will let the patient know if she needs to change her abx

## 2024-04-03 ENCOUNTER — HOSPITAL ENCOUNTER (OUTPATIENT)
Facility: HOSPITAL | Age: 74
Discharge: HOME | End: 2024-04-06
Attending: UROLOGY
Payer: MEDICARE

## 2024-04-03 DIAGNOSIS — N20.0 KIDNEY STONE: ICD-10-CM

## 2024-04-03 DIAGNOSIS — N39.0 RECURRENT UTI: ICD-10-CM

## 2024-04-03 PROCEDURE — 74176 CT ABD & PELVIS W/O CONTRAST: CPT

## 2024-04-03 RX ORDER — CEPHALEXIN 500 MG/1
500 CAPSULE ORAL 2 TIMES DAILY
Qty: 20 CAPSULE | Refills: 0 | Status: SHIPPED | OUTPATIENT
Start: 2024-04-03 | End: 2024-04-13

## 2024-04-17 RX ORDER — OXYBUTYNIN CHLORIDE 10 MG/1
10 TABLET, EXTENDED RELEASE ORAL DAILY
Qty: 90 TABLET | OUTPATIENT
Start: 2024-04-17

## 2024-04-20 LAB
25(OH)D3+25(OH)D2 SERPL-MCNC: 35.7 NG/ML (ref 30–100)
ALBUMIN/CREAT UR: 30 MG/G CREAT (ref 0–29)
BUN SERPL-MCNC: 40 MG/DL (ref 8–27)
BUN/CREAT SERPL: 45 (ref 12–28)
CALCIUM SERPL-MCNC: 10.6 MG/DL (ref 8.7–10.3)
CHLORIDE SERPL-SCNC: 103 MMOL/L (ref 96–106)
CO2 SERPL-SCNC: 22 MMOL/L (ref 20–29)
CREAT SERPL-MCNC: 0.89 MG/DL (ref 0.57–1)
CREAT UR-MCNC: 62.5 MG/DL
EGFRCR SERPLBLD CKD-EPI 2021: 69 ML/MIN/1.73
GLUCOSE SERPL-MCNC: 112 MG/DL (ref 70–99)
HBA1C MFR BLD: 6.2 % (ref 4.8–5.6)
MICROALBUMIN UR-MCNC: 18.9 UG/ML
POTASSIUM SERPL-SCNC: 4.1 MMOL/L (ref 3.5–5.2)
SODIUM SERPL-SCNC: 140 MMOL/L (ref 134–144)
T4 FREE SERPL-MCNC: 1.53 NG/DL (ref 0.82–1.77)
TSH SERPL DL<=0.005 MIU/L-ACNC: 3.29 UIU/ML (ref 0.45–4.5)

## 2024-04-24 ENCOUNTER — OFFICE VISIT (OUTPATIENT)
Age: 74
End: 2024-04-24
Payer: MEDICARE

## 2024-04-24 VITALS — DIASTOLIC BLOOD PRESSURE: 74 MMHG | HEART RATE: 80 BPM | SYSTOLIC BLOOD PRESSURE: 157 MMHG

## 2024-04-24 DIAGNOSIS — R32 URINARY INCONTINENCE, UNSPECIFIED TYPE: ICD-10-CM

## 2024-04-24 DIAGNOSIS — N39.41 URGE INCONTINENCE: ICD-10-CM

## 2024-04-24 DIAGNOSIS — N39.0 RECURRENT UTI: Primary | ICD-10-CM

## 2024-04-24 DIAGNOSIS — R35.0 FREQUENCY OF MICTURITION: ICD-10-CM

## 2024-04-24 DIAGNOSIS — N30.10 INTERSTITIAL CYSTITIS (CHRONIC) WITHOUT HEMATURIA: ICD-10-CM

## 2024-04-24 LAB
BILIRUBIN, URINE, POC: NEGATIVE
BLOOD URINE, POC: NEGATIVE
GLUCOSE URINE, POC: NEGATIVE
KETONES, URINE, POC: NEGATIVE
LEUKOCYTE ESTERASE, URINE, POC: ABNORMAL
NITRITE, URINE, POC: POSITIVE
PH, URINE, POC: 5.5 (ref 4.6–8)
PROTEIN,URINE, POC: NEGATIVE
SPECIFIC GRAVITY, URINE, POC: 1.02 (ref 1–1.03)
URINALYSIS CLARITY, POC: CLEAR
URINALYSIS COLOR, POC: YELLOW
UROBILINOGEN, POC: ABNORMAL

## 2024-04-24 PROCEDURE — G8428 CUR MEDS NOT DOCUMENT: HCPCS | Performed by: UROLOGY

## 2024-04-24 PROCEDURE — 1090F PRES/ABSN URINE INCON ASSESS: CPT | Performed by: UROLOGY

## 2024-04-24 PROCEDURE — 1036F TOBACCO NON-USER: CPT | Performed by: UROLOGY

## 2024-04-24 PROCEDURE — G8400 PT W/DXA NO RESULTS DOC: HCPCS | Performed by: UROLOGY

## 2024-04-24 PROCEDURE — 99214 OFFICE O/P EST MOD 30 MIN: CPT | Performed by: UROLOGY

## 2024-04-24 PROCEDURE — 3078F DIAST BP <80 MM HG: CPT | Performed by: UROLOGY

## 2024-04-24 PROCEDURE — 3077F SYST BP >= 140 MM HG: CPT | Performed by: UROLOGY

## 2024-04-24 PROCEDURE — 81003 URINALYSIS AUTO W/O SCOPE: CPT | Performed by: UROLOGY

## 2024-04-24 PROCEDURE — G8420 CALC BMI NORM PARAMETERS: HCPCS | Performed by: UROLOGY

## 2024-04-24 PROCEDURE — 1123F ACP DISCUSS/DSCN MKR DOCD: CPT | Performed by: UROLOGY

## 2024-04-24 PROCEDURE — 3017F COLORECTAL CA SCREEN DOC REV: CPT | Performed by: UROLOGY

## 2024-04-24 PROCEDURE — 0509F URINE INCON PLAN DOCD: CPT | Performed by: UROLOGY

## 2024-04-24 RX ORDER — CEPHALEXIN 500 MG/1
500 CAPSULE ORAL 2 TIMES DAILY
Qty: 14 CAPSULE | Refills: 0 | Status: SHIPPED | OUTPATIENT
Start: 2024-04-24 | End: 2024-05-01

## 2024-04-24 RX ORDER — NITROFURANTOIN MACROCRYSTALS 100 MG/1
100 CAPSULE ORAL NIGHTLY
Qty: 90 CAPSULE | Refills: 3 | Status: SHIPPED | OUTPATIENT
Start: 2024-04-24 | End: 2024-07-23

## 2024-04-24 NOTE — PROGRESS NOTES
HISTORY OF PRESENT ILLNESS  Regina Oleary is a 73 y.o. female   Patient came in today for follow-up still have a little burning and frequency.  She had low-grade E. coli infection which was resistant to Bactrim before.  Will put her on Keflex for 7 days and Macrodantin suppression as Macrodantin and Keflex both were identified as antibiotics that the bacteria was sensitive to.  I will see her back in 3 months and go from there.  The CAT scan did not show anything different than what was seen in November 2022 no sign of fistula etc.  1. Recurrent UTI  Overview:  Last urine culture 8/14/23: E.coli - 25,000-50,000 colonies. Treated with Cipro.    CT scan on 11/2022  -Punctate nonobstructing right renal stone. No hydronephrosis.   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  Ct scan 4/2024  Duplicated right renal collecting system with atrophy of the  upper pole again noted. Punctate nonobstructing right renal stone. No ureteral  stone or hydronephrosis.  Orders:  -     AMB POC URINALYSIS DIP STICK AUTO W/O MICRO  -     Culture, Urine  -     cephALEXin (KEFLEX) 500 MG capsule; Take 1 capsule by mouth 2 times daily for 7 days, Disp-14 capsule, R-0Normal  -     nitrofurantoin (MACRODANTIN) 100 MG capsule; Take 1 capsule by mouth nightly Start Macrodantin after she has finished the Keflex, Disp-90 capsule, R-3Normal  2. Interstitial cystitis (chronic) without hematuria  Overview:  Procedures:   4/13/23: Cystoscopy with hydrodistension, Botox injection  2/23/23: Cystoscopy with fulguration of bleeders, Urethral dilation, Botox injection  She is was/is On hyoscyamine.   3. Frequency of micturition  Overview:  Component 8/14/23 0928 6/20/23 0908 6/6/23 1208   PVR, POC 31ml  116ml  194ml CM     4. Urge incontinence  5. Urinary incontinence, unspecified type        PAST MEDICAL HISTORY  PMHx (including negatives):  has a past medical history of Acid reflux, Arthritis, Asthma, Back pain, Burning

## 2024-04-24 NOTE — PROGRESS NOTES
Chief Complaint   Patient presents with    Results        BP (!) 157/74 (Site: Left Upper Arm, Position: Sitting, Cuff Size: Medium Adult)   Pulse 80      PHQ-9 score is    Negative      1. \"Have you been to the ER, urgent care clinic since your last visit?  Hospitalized since your last visit?\" No    2. \"Have you seen or consulted any other health care providers outside of the [facility] since your last visit?\" No     3. For patients aged 45-75: Has the patient had a colonoscopy / FIT/ Cologuard? NA - based on age      If the patient is female:    4. For patients aged 40-74: Has the patient had a mammogram within the past 2 years? NA - based on age or sex      5. For patients aged 21-65: Has the patient had a pap smear? NA - based on age or sex

## 2024-04-26 LAB
BACTERIA UR CULT: ABNORMAL
LDLC SERPL DIRECT ASSAY-MCNC: 84 MG/DL (ref 0–99)

## 2024-04-29 ENCOUNTER — TELEPHONE (OUTPATIENT)
Age: 74
End: 2024-04-29

## 2024-04-29 NOTE — TELEPHONE ENCOUNTER
Pt called and left a voicemail that she had questions and some confusion regarding medication. Could you please advise

## 2024-05-02 ENCOUNTER — OFFICE VISIT (OUTPATIENT)
Age: 74
End: 2024-05-02

## 2024-05-02 VITALS
RESPIRATION RATE: 20 BRPM | BODY MASS INDEX: 24.84 KG/M2 | TEMPERATURE: 98.2 F | HEIGHT: 62 IN | OXYGEN SATURATION: 100 % | DIASTOLIC BLOOD PRESSURE: 54 MMHG | WEIGHT: 135 LBS | HEART RATE: 65 BPM | SYSTOLIC BLOOD PRESSURE: 131 MMHG

## 2024-05-02 DIAGNOSIS — E11.65 TYPE 2 DIABETES MELLITUS WITH HYPERGLYCEMIA, WITHOUT LONG-TERM CURRENT USE OF INSULIN (HCC): Primary | ICD-10-CM

## 2024-05-02 DIAGNOSIS — I10 ESSENTIAL HYPERTENSION: ICD-10-CM

## 2024-05-02 DIAGNOSIS — E05.80 IATROGENIC HYPERTHYROIDISM: ICD-10-CM

## 2024-05-02 RX ORDER — SITAGLIPTIN AND METFORMIN HYDROCHLORIDE 1000; 50 MG/1; MG/1
1 TABLET, FILM COATED, EXTENDED RELEASE ORAL DAILY
Qty: 90 TABLET | Refills: 3 | Status: SHIPPED | OUTPATIENT
Start: 2024-05-02

## 2024-05-02 RX ORDER — NITROFURANTOIN MACROCRYSTALS 100 MG/1
100 CAPSULE ORAL NIGHTLY
COMMUNITY

## 2024-05-02 NOTE — PROGRESS NOTES
From: Farnaz Ellis  To: Amber Skinner MD  Sent: 1/9/2019 11:25 AM CST  Subject: Prescription Question    Doc, would it be possible to get a refill on the Tussionex cough medicine?  I have started coughing in the evening & is the only thing that helps 02/25/2022    GLUCOSE 112 (H) 04/19/2024    CALCIUM 10.6 (H) 04/19/2024    MALBCR 30 (H) 04/19/2024              Lab Results   Component Value Date    TSH 3.290 04/19/2024    T4FREE 1.53 04/19/2024       No results found for: \"TRAB\", \"TSI\", \"TSILT\", \"TPO\"           Assessment/Plan:                1. Type 2 Diabetes Mellitus     Hemoglobin A1C, POC   Date Value Ref Range Status   01/30/2024 5.8 % Final       controlled    Janumet extended release 1 tablet,      No history of pancreatitis   Gastroenterologist - .  She was diagnosed with exogenous pancreatic insufficiency, on Creon         2.  HTN :  Continue current therapy       3. Hyperlipidemia :  myalgia, off statin and refused       4.  History of hepatitis C      5. Hashitoxicosis - off LT4 2020   NM scan - 45 % diffuse uptake   Off MMI    On LT4 now, biochemically euthyroid  The labs      6.MS       7.Hypercalcemia  - fluctuating    - on HCTZ, on MVI   Holding the hydrochlorothiazide improved the calcium, as long as there is mild hypercalcemia she can go back on hydrochlorothiazide  Hydration         8.Anemia - seeing Hematology ,               Madelin Caban MD         Patient verbalized understanding       Voice-recognition software was used to generate this report, which may result in some phonetic-based errors in the grammar and contents.  Even though attempts were made to correct all the mistakes,  some may have been missed and remained in the body of the report.

## 2024-07-24 ENCOUNTER — OFFICE VISIT (OUTPATIENT)
Age: 74
End: 2024-07-24

## 2024-07-24 VITALS — DIASTOLIC BLOOD PRESSURE: 65 MMHG | HEART RATE: 83 BPM | SYSTOLIC BLOOD PRESSURE: 110 MMHG

## 2024-07-24 DIAGNOSIS — E11.65 TYPE 2 DIABETES MELLITUS WITH HYPERGLYCEMIA, WITHOUT LONG-TERM CURRENT USE OF INSULIN (HCC): ICD-10-CM

## 2024-07-24 DIAGNOSIS — R35.0 FREQUENCY OF MICTURITION: ICD-10-CM

## 2024-07-24 DIAGNOSIS — N39.0 URINARY TRACT INFECTION WITH HEMATURIA, SITE UNSPECIFIED: ICD-10-CM

## 2024-07-24 DIAGNOSIS — N39.0 RECURRENT UTI: Primary | ICD-10-CM

## 2024-07-24 DIAGNOSIS — R30.0 DYSURIA: ICD-10-CM

## 2024-07-24 DIAGNOSIS — R82.81 PYURIA: ICD-10-CM

## 2024-07-24 DIAGNOSIS — R31.9 URINARY TRACT INFECTION WITH HEMATURIA, SITE UNSPECIFIED: ICD-10-CM

## 2024-07-24 LAB
BILIRUBIN, URINE, POC: NEGATIVE
BLOOD URINE, POC: NEGATIVE
GLUCOSE URINE, POC: NEGATIVE
KETONES, URINE, POC: NEGATIVE
LEUKOCYTE ESTERASE, URINE, POC: ABNORMAL
NITRITE, URINE, POC: NEGATIVE
PH, URINE, POC: 5.5 (ref 4.6–8)
PROTEIN,URINE, POC: NEGATIVE
PVR, POC: NORMAL CC
SPECIFIC GRAVITY, URINE, POC: 1.02 (ref 1–1.03)
URINALYSIS CLARITY, POC: CLEAR
URINALYSIS COLOR, POC: YELLOW
UROBILINOGEN, POC: ABNORMAL

## 2024-07-24 RX ORDER — CIPROFLOXACIN 500 MG/1
500 TABLET, FILM COATED ORAL 2 TIMES DAILY
Qty: 14 TABLET | Refills: 0 | Status: SHIPPED | OUTPATIENT
Start: 2024-07-24 | End: 2024-07-31

## 2024-07-24 NOTE — PROGRESS NOTES
Chief Complaint   Patient presents with    Follow-up     cystitis        /65   Pulse 83      PHQ-9 score is    Negative      1. \"Have you been to the ER, urgent care clinic since your last visit?  Hospitalized since your last visit?\" No    2. \"Have you seen or consulted any other health care providers outside of the [facility] since your last visit?\" No     3. For patients aged 45-75: Has the patient had a colonoscopy / FIT/ Cologuard? Yes - no Care Gap present      If the patient is female:    4. For patients aged 40-74: Has the patient had a mammogram within the past 2 years? Yes - no Care Gap present      5. For patients aged 21-65: Has the patient had a pap smear? Yes - no Care Gap present

## 2024-07-24 NOTE — PROGRESS NOTES
HISTORY OF PRESENT ILLNESS  Regina Oleary is a 73 y.o. female   Patient returns today and feels like she has a little bladder infection.  She denies fevers chills flank pain but does have urgency frequency and burning  1. Recurrent UTI  Overview:  Urine culture;  8/14/23: E.coli - 25,000-50,000 colonies. Treated with Cipro.    4/24/24- Escherichia coli   Greater than 100,000 colony forming units per mL; Tx with keflex    She was put on Macrodantin suppression 4/2024    CT scan on 11/2022  -Punctate nonobstructing right renal stone. No hydronephrosis.   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  Ct scan 4/2024  Duplicated right renal collecting system with atrophy of the  upper pole again noted. Punctate nonobstructing right renal stone. No ureteral  stone or hydronephrosis.      Orders:  -     AMB POC URINALYSIS DIP STICK AUTO W/O MICRO  -     AMB POC PVR, BILL,POST-VOID RES,US,NON-IMAGING  -     Culture, Urine  -     ciprofloxacin (CIPRO) 500 MG tablet; Take 1 tablet by mouth 2 times daily for 7 days, Disp-14 tablet, R-0Normal  2. Pyuria  -     AMB POC PVR, BILL,POST-VOID RES,US,NON-IMAGING  -     Culture, Urine  -     ciprofloxacin (CIPRO) 500 MG tablet; Take 1 tablet by mouth 2 times daily for 7 days, Disp-14 tablet, R-0Normal  3. Type 2 diabetes mellitus with hyperglycemia, without long-term current use of insulin (HCC)  4. Urinary tract infection with hematuria, site unspecified  Overview:  Urine culture;  8/14/23: E.coli - 25,000-50,000 colonies. Treated with Cipro.    4/24/24- Escherichia coli   Greater than 100,000 colony forming units per mL; Tx with keflex    She was put on Macrodantin suppression 4/2024    CT scan on 11/2022  -Punctate nonobstructing right renal stone. No hydronephrosis.   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  Ct scan 4/2024  Duplicated right renal collecting system with atrophy of the  upper pole again noted. Punctate

## 2024-07-26 LAB — BACTERIA UR CULT: NORMAL

## 2024-08-27 LAB — HBA1C MFR BLD: 6 % (ref 4.8–5.6)

## 2024-09-06 ENCOUNTER — OFFICE VISIT (OUTPATIENT)
Age: 74
End: 2024-09-06

## 2024-09-06 VITALS
BODY MASS INDEX: 25.3 KG/M2 | TEMPERATURE: 99 F | OXYGEN SATURATION: 98 % | HEIGHT: 62 IN | DIASTOLIC BLOOD PRESSURE: 63 MMHG | HEART RATE: 79 BPM | SYSTOLIC BLOOD PRESSURE: 132 MMHG | WEIGHT: 137.5 LBS

## 2024-09-06 DIAGNOSIS — E11.65 TYPE 2 DIABETES MELLITUS WITH HYPERGLYCEMIA, WITHOUT LONG-TERM CURRENT USE OF INSULIN (HCC): Primary | ICD-10-CM

## 2024-09-06 DIAGNOSIS — E03.9 PRIMARY HYPOTHYROIDISM: ICD-10-CM

## 2024-09-06 DIAGNOSIS — I10 ESSENTIAL HYPERTENSION: ICD-10-CM

## 2024-09-06 RX ORDER — CARVEDILOL 6.25 MG/1
6.25 TABLET ORAL 2 TIMES DAILY
COMMUNITY
Start: 2024-06-07 | End: 2025-06-07

## 2024-09-06 ASSESSMENT — PATIENT HEALTH QUESTIONNAIRE - PHQ9
2. FEELING DOWN, DEPRESSED OR HOPELESS: NOT AT ALL
SUM OF ALL RESPONSES TO PHQ QUESTIONS 1-9: 0
1. LITTLE INTEREST OR PLEASURE IN DOING THINGS: NOT AT ALL
SUM OF ALL RESPONSES TO PHQ QUESTIONS 1-9: 0
SUM OF ALL RESPONSES TO PHQ QUESTIONS 1-9: 0
SUM OF ALL RESPONSES TO PHQ9 QUESTIONS 1 & 2: 0
SUM OF ALL RESPONSES TO PHQ QUESTIONS 1-9: 0

## 2024-09-06 NOTE — PROGRESS NOTES
Regina Oleary is a 73 y.o. female here for   Chief Complaint   Patient presents with    Follow-up    Diabetes       All medication reviewed.     Vitals:    09/06/24 0853   BP: 132/63   Site: Left Upper Arm   Position: Sitting   Cuff Size: Large Adult   Pulse: 79   Temp: 99 °F (37.2 °C)   TempSrc: Temporal   SpO2: 98%   Weight: 62.4 kg (137 lb 8 oz)   Height: 1.575 m (5' 2\")        1. Have you been to the ER, urgent care clinic since your last visit?  Hospitalized since your last visit? -no    2. Have you seen or consulted any other health care providers outside of the [facility] since your last visit?  Include any pap smears or colon screening.-no Mammogram completed at [facility] 2024      
MALBCR 30 (H) 04/19/2024              Lab Results   Component Value Date    TSH 3.290 04/19/2024    T4FREE 1.53 04/19/2024       No results found for: \"TRAB\", \"TSI\", \"TSILT\", \"TPO\"           Assessment/Plan:                1. Type 2 Diabetes Mellitus     Hemoglobin A1C, POC   Date Value Ref Range Status   01/30/2024 5.8 % Final       controlled    Janumet extended release 1 tablet,      No history of pancreatitis   Gastroenterologist - .  She was diagnosed with exogenous pancreatic insufficiency, on Creon         2.  HTN :  Continue current therapy       3. Hyperlipidemia :  myalgia, off statin and refused       4.  History of hepatitis C      5. Hashitoxicosis - off LT4 2020   NM scan - 45 % diffuse uptake   Off MMI    On LT4 now, biochemically euthyroid        6.MS       7.Hypercalcemia  - fluctuating    - on HCTZ, on MVI   Holding the hydrochlorothiazide improved the calcium, as long as there is mild hypercalcemia she can go back on hydrochlorothiazide  Hydration         8.Anemia - seeing Hematology ,      Urinary/stool incontinence: Agustina Caban MD         Patient verbalized understanding       Voice-recognition software was used to generate this report, which may result in some phonetic-based errors in the grammar and contents.  Even though attempts were made to correct all the mistakes,  some may have been missed and remained in the body of the report.

## 2024-09-18 DIAGNOSIS — E11.65 TYPE 2 DIABETES MELLITUS WITH HYPERGLYCEMIA, WITHOUT LONG-TERM CURRENT USE OF INSULIN (HCC): ICD-10-CM

## 2024-09-18 DIAGNOSIS — I10 ESSENTIAL HYPERTENSION: ICD-10-CM

## 2024-09-18 RX ORDER — BLOOD SUGAR DIAGNOSTIC
STRIP MISCELLANEOUS
Qty: 200 STRIP | Refills: 3 | Status: SHIPPED | OUTPATIENT
Start: 2024-09-18

## 2024-12-12 DIAGNOSIS — E05.80 IATROGENIC HYPERTHYROIDISM: ICD-10-CM

## 2024-12-12 RX ORDER — LEVOTHYROXINE SODIUM 25 UG/1
TABLET ORAL
Qty: 90 TABLET | Refills: 3 | Status: SHIPPED | OUTPATIENT
Start: 2024-12-12

## 2025-01-09 LAB
ALBUMIN/CREAT UR: 12 MG/G CREAT (ref 0–29)
BUN SERPL-MCNC: 32 MG/DL (ref 8–27)
BUN/CREAT SERPL: 39 (ref 12–28)
CALCIUM SERPL-MCNC: 10.1 MG/DL (ref 8.7–10.3)
CHLORIDE SERPL-SCNC: 104 MMOL/L (ref 96–106)
CO2 SERPL-SCNC: 24 MMOL/L (ref 20–29)
CREAT SERPL-MCNC: 0.82 MG/DL (ref 0.57–1)
CREAT UR-MCNC: 56.4 MG/DL
EGFRCR SERPLBLD CKD-EPI 2021: 76 ML/MIN/1.73
GLUCOSE SERPL-MCNC: 140 MG/DL (ref 70–99)
HBA1C MFR BLD: 6.1 % (ref 4.8–5.6)
LDLC SERPL DIRECT ASSAY-MCNC: 88 MG/DL (ref 0–99)
MICROALBUMIN UR-MCNC: 7 UG/ML
POTASSIUM SERPL-SCNC: 4.1 MMOL/L (ref 3.5–5.2)
SODIUM SERPL-SCNC: 141 MMOL/L (ref 134–144)
T4 FREE SERPL-MCNC: 1.64 NG/DL (ref 0.82–1.77)
TSH SERPL DL<=0.005 MIU/L-ACNC: 2.36 UIU/ML (ref 0.45–4.5)

## 2025-01-10 ENCOUNTER — OFFICE VISIT (OUTPATIENT)
Age: 75
End: 2025-01-10
Payer: MEDICAID

## 2025-01-10 VITALS
HEIGHT: 62 IN | BODY MASS INDEX: 26.04 KG/M2 | HEART RATE: 68 BPM | SYSTOLIC BLOOD PRESSURE: 117 MMHG | DIASTOLIC BLOOD PRESSURE: 63 MMHG | TEMPERATURE: 97.8 F | OXYGEN SATURATION: 100 % | WEIGHT: 141.5 LBS

## 2025-01-10 DIAGNOSIS — I10 ESSENTIAL HYPERTENSION: ICD-10-CM

## 2025-01-10 DIAGNOSIS — E11.65 TYPE 2 DIABETES MELLITUS WITH HYPERGLYCEMIA, WITHOUT LONG-TERM CURRENT USE OF INSULIN (HCC): Primary | ICD-10-CM

## 2025-01-10 DIAGNOSIS — E03.9 PRIMARY HYPOTHYROIDISM: ICD-10-CM

## 2025-01-10 PROCEDURE — 99214 OFFICE O/P EST MOD 30 MIN: CPT | Performed by: INTERNAL MEDICINE

## 2025-01-10 PROCEDURE — 3044F HG A1C LEVEL LT 7.0%: CPT | Performed by: INTERNAL MEDICINE

## 2025-01-10 PROCEDURE — 3074F SYST BP LT 130 MM HG: CPT | Performed by: INTERNAL MEDICINE

## 2025-01-10 PROCEDURE — 3078F DIAST BP <80 MM HG: CPT | Performed by: INTERNAL MEDICINE

## 2025-01-10 PROCEDURE — 1123F ACP DISCUSS/DSCN MKR DOCD: CPT | Performed by: INTERNAL MEDICINE

## 2025-01-10 NOTE — PROGRESS NOTES
Regina Oleary is a 74 y.o. female here for   Chief Complaint   Patient presents with    Diabetes       1. Have you been to the ER, urgent care clinic since your last visit?  Hospitalized since your last visit? -NO    2. Have you seen or consulted any other health care providers outside of the [facility] since your last visit?  Include any pap smears or colon screening.-NO      
01/08/2025    LABA1C 6.0 (H) 08/26/2024    LABA1C 6.2 (H) 04/19/2024         Lab Results   Component Value Date     01/08/2025    K 4.1 01/08/2025     01/08/2025    CO2 24 01/08/2025    BUN 32 (H) 01/08/2025    CREATININE 0.82 01/08/2025    GFRAA >60 02/25/2022    GLUCOSE 140 (H) 01/08/2025    CALCIUM 10.1 01/08/2025              Lab Results   Component Value Date    TSH 2.360 01/08/2025    T4FREE 1.64 01/08/2025       No results found for: \"TRAB\", \"TSI\", \"TSILT\", \"TPO\"           Assessment/Plan:                1. Type 2 Diabetes Mellitus     Hemoglobin A1C, POC   Date Value Ref Range Status   01/30/2024 5.8 % Final       controlled    Janumet extended release 1 tablet,      No history of pancreatitis   Gastroenterologist - .  She was diagnosed with exogenous pancreatic insufficiency, on Creon         2.  HTN :  Continue current therapy       3. Hyperlipidemia :  myalgia, off statin and refused       4.  History of hepatitis C      5. Hashitoxicosis - off LT4 2020   NM scan - 45 % diffuse uptake   Off MMI    On LT4 now,         6.MS       7.Hypercalcemia  - fluctuating    - on HCTZ, on MVI   Holding the hydrochlorothiazide improved the calcium, as long as there is mild hypercalcemia she can go back on hydrochlorothiazide  Hydration         8.Anemia - seeing Hematology ,               Madelin Caban MD         Patient verbalized understanding       Voice-recognition software was used to generate this report, which may result in some phonetic-based errors in the grammar and contents.  Even though attempts were made to correct all the mistakes,  some may have been missed and remained in the body of the report.

## 2025-01-27 ENCOUNTER — OFFICE VISIT (OUTPATIENT)
Age: 75
End: 2025-01-27
Payer: MEDICAID

## 2025-01-27 VITALS — DIASTOLIC BLOOD PRESSURE: 68 MMHG | SYSTOLIC BLOOD PRESSURE: 108 MMHG | HEART RATE: 97 BPM

## 2025-01-27 DIAGNOSIS — N39.0 RECURRENT UTI: ICD-10-CM

## 2025-01-27 DIAGNOSIS — N30.01 ACUTE CYSTITIS WITH HEMATURIA: Primary | ICD-10-CM

## 2025-01-27 DIAGNOSIS — N95.2 POSTMENOPAUSAL ATROPHIC VAGINITIS: ICD-10-CM

## 2025-01-27 DIAGNOSIS — N39.41 URGE INCONTINENCE: ICD-10-CM

## 2025-01-27 DIAGNOSIS — R82.81 PYURIA: ICD-10-CM

## 2025-01-27 LAB
BILIRUBIN, URINE, POC: NEGATIVE
BLOOD URINE, POC: ABNORMAL
GLUCOSE URINE, POC: NEGATIVE
KETONES, URINE, POC: ABNORMAL
LEUKOCYTE ESTERASE, URINE, POC: ABNORMAL
NITRITE, URINE, POC: NEGATIVE
PH, URINE, POC: 5.5 (ref 4.6–8)
PROTEIN,URINE, POC: 30
SPECIFIC GRAVITY, URINE, POC: 1.02 (ref 1–1.03)
URINALYSIS CLARITY, POC: CLEAR
URINALYSIS COLOR, POC: YELLOW
UROBILINOGEN, POC: ABNORMAL

## 2025-01-27 PROCEDURE — 1123F ACP DISCUSS/DSCN MKR DOCD: CPT | Performed by: UROLOGY

## 2025-01-27 PROCEDURE — 3078F DIAST BP <80 MM HG: CPT | Performed by: UROLOGY

## 2025-01-27 PROCEDURE — 81003 URINALYSIS AUTO W/O SCOPE: CPT | Performed by: UROLOGY

## 2025-01-27 PROCEDURE — 3074F SYST BP LT 130 MM HG: CPT | Performed by: UROLOGY

## 2025-01-27 PROCEDURE — 99214 OFFICE O/P EST MOD 30 MIN: CPT | Performed by: UROLOGY

## 2025-01-27 RX ORDER — CEPHALEXIN 500 MG/1
500 CAPSULE ORAL 2 TIMES DAILY
Qty: 14 CAPSULE | Refills: 0 | Status: SHIPPED | OUTPATIENT
Start: 2025-01-27 | End: 2025-02-03

## 2025-01-27 RX ORDER — ESTRADIOL 0.1 MG/G
1 CREAM VAGINAL
Qty: 42.5 G | Refills: 5 | Status: SHIPPED | OUTPATIENT
Start: 2025-01-27

## 2025-01-27 ASSESSMENT — ENCOUNTER SYMPTOMS: DIARRHEA: 1

## 2025-01-27 NOTE — PROGRESS NOTES
HISTORY OF PRESENT ILLNESS  Regina Oleary is a 74 y.o. female   Patient returns today she feels like she has another UTI.  Her urine was not that impressive but may be infected I sent for culture we will put her on Keflex treatment and then Keflex suppression as she think she is getting more UTIs.  She is also having stool incontinence and leg weakness.  She is scheduled for MRI of her lumbar spine by another physician.  She very well may have a disc in her lumbar area but it could be her MS etc.  Today she denies fevers and chills flank pain nausea vomiting weight loss or bone pain.  She is using her Estrace cream to prevent infections and help her urethra  1. Acute cystitis with hematuria  Overview:  Urine culture;  8/14/23: E.coli - 25,000-50,000 colonies. Treated with Cipro.    4/24/24- Escherichia coli   Greater than 100,000 colony forming units per mL; Tx with keflex    She was put on Macrodantin suppression 4/2024    CT scan on 11/2022  -Punctate nonobstructing right renal stone. No hydronephrosis.   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  Ct scan 4/2024  Duplicated right renal collecting system with atrophy of the  upper pole again noted. Punctate nonobstructing right renal stone. No ureteral  stone or hydronephrosis.      Orders:  -     AMB POC URINALYSIS DIP STICK AUTO W/O MICRO  -     Culture, Urine  -     estradiol (ESTRACE) 0.1 MG/GM vaginal cream; Place 1 g vaginally three times a week Do not use applicator use fingertip amount, pea size, directly to the opening of her urethra, Disp-42.5 g, R-5Normal  -     cephALEXin (KEFLEX) 500 MG capsule; Take 1 capsule by mouth 2 times daily for 7 days, Disp-14 capsule, R-0Normal  2. Pyuria  3. Recurrent UTI  -     cephALEXin (KEFLEX) 250 MG capsule; Take 1 capsule by mouth nightly for 365 doses Start taking after she finishes the 7 days of 500 twice daily, Disp-90 capsule, R-3Normal  4. Urge incontinence  5. Postmenopausal

## 2025-01-27 NOTE — PROGRESS NOTES
Chief Complaint   Patient presents with    Follow-up Chronic Condition             PHQ-9 score is    Negative        2/28/2023     1:55 PM   Amb Fall Risk Assessment and TUG Test   Fall in past 12 months? 0   Able to walk? Yes          1. \"Have you been to the ER, urgent care clinic since your last visit?  Hospitalized since your last visit?\" No    2. \"Have you seen or consulted any other health care providers outside of the [facility] since your last visit?\" No     3. For patients aged 45-75: Has the patient had a colonoscopy / FIT/ Cologuard? Yes - no Care Gap present      If the patient is female:    4. For patients aged 40-74: Has the patient had a mammogram within the past 2 years? Yes - no Care Gap present      5. For patients aged 21-65: Has the patient had a pap smear? Yes - no Care Gap present

## 2025-01-29 LAB — BACTERIA UR CULT: ABNORMAL

## 2025-01-30 RX ORDER — CIPROFLOXACIN 250 MG/1
250 TABLET, FILM COATED ORAL 2 TIMES DAILY
Qty: 14 TABLET | Refills: 0 | Status: SHIPPED | OUTPATIENT
Start: 2025-01-30 | End: 2025-02-06

## 2025-02-08 ENCOUNTER — APPOINTMENT (OUTPATIENT)
Facility: HOSPITAL | Age: 75
End: 2025-02-08
Payer: MEDICARE

## 2025-02-08 ENCOUNTER — HOSPITAL ENCOUNTER (EMERGENCY)
Facility: HOSPITAL | Age: 75
Discharge: HOME | End: 2025-02-08
Payer: MEDICARE

## 2025-02-08 VITALS
OXYGEN SATURATION: 99 % | TEMPERATURE: 98.2 F | HEIGHT: 63 IN | WEIGHT: 135 LBS | SYSTOLIC BLOOD PRESSURE: 135 MMHG | BODY MASS INDEX: 23.92 KG/M2 | DIASTOLIC BLOOD PRESSURE: 65 MMHG | RESPIRATION RATE: 18 BRPM | HEART RATE: 66 BPM

## 2025-02-08 DIAGNOSIS — S09.90XA CLOSED HEAD INJURY, INITIAL ENCOUNTER: Primary | ICD-10-CM
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
## 2025-02-08 DIAGNOSIS — S80.02XA CONTUSION OF LEFT KNEE, INITIAL ENCOUNTER: ICD-10-CM

## 2025-02-08 PROCEDURE — 99284 EMERGENCY DEPT VISIT MOD MDM: CPT

## 2025-02-08 PROCEDURE — 73562 X-RAY EXAM OF KNEE 3: CPT

## 2025-02-08 PROCEDURE — 72125 CT NECK SPINE W/O DYE: CPT

## 2025-02-08 PROCEDURE — 73060 X-RAY EXAM OF HUMERUS: CPT

## 2025-02-08 PROCEDURE — 70450 CT HEAD/BRAIN W/O DYE: CPT

## 2025-02-08 ASSESSMENT — PAIN SCALES - GENERAL
PAINLEVEL_OUTOF10: 8
PAINLEVEL_OUTOF10: 5

## 2025-02-08 ASSESSMENT — PAIN - FUNCTIONAL ASSESSMENT: PAIN_FUNCTIONAL_ASSESSMENT: 0-10

## 2025-02-08 NOTE — ED PROVIDER NOTES
Reassessment: {Sepsis reassessment smartlist:82509}    Clinical Management Tools:  {CMT List:29117::\"Not Applicable\"}    Patient was given the following medications:  Medications - No data to display    CONSULTS: See ED Course/MDM for further details.  None     Social Determinants affecting Diagnosis/Treatment: {Social Determinants:82564}    Smoking Cessation: {smoking cessation smartlist:46610}    PROCEDURES   Unless otherwise noted above, none.  Procedures      CRITICAL CARE TIME   {critical care smartlist:78047}    ED IMPRESSION   No diagnosis found.      DISPOSITION/PLAN   DISPOSITION              {ED Dispositions:85898}     PATIENT REFERRED TO:  No follow-up provider specified.      DISCHARGE MEDICATIONS:     Medication List        CONTINUE taking these medications      CPAP Machine Misc            ASK your doctor about these medications      Accu-Chek Isabel Plus strip  Generic drug: blood glucose test strips  Check BG twice daily. Dx code E11.65     albuterol sulfate  (90 Base) MCG/ACT inhaler  Commonly known as: PROVENTIL;VENTOLIN;PROAIR     aspirin 81 MG EC tablet     carvedilol 6.25 MG tablet  Commonly known as: COREG     cephALEXin 250 MG capsule  Commonly known as: Keflex  Take 1 capsule by mouth nightly for 365 doses Start taking after she finishes the 7 days of 500 twice daily     cloNIDine 0.1 MG tablet  Commonly known as: CATAPRES     colestipol 1 g tablet  Commonly known as: COLESTID     dimethyl fumarate 240 MG delayed release capsule  Commonly known as: TECFIDERA     estradiol 0.1 MG/GM vaginal cream  Commonly known as: ESTRACE  Place 1 g vaginally three times a week Do not use applicator use fingertip amount, pea size, directly to the opening of her urethra     hydroCHLOROthiazide 12.5 MG capsule     Janumet XR  MG Tb24 per extended release tablet  Generic drug: SITagliptin-metFORMIN  Take 1 tablet by mouth daily     lactase 3000 units tablet  Commonly known as: LACTAID    tablet  Commonly known as: SYNTHROID  TAKE 1 TABLET BY MOUTH DAILY BEFORE BREAKFAST     lipase-protease-amylase 71832-73576 units delayed release capsule  Commonly known as: CREON     lisinopril 40 MG tablet  Commonly known as: PRINIVIL;ZESTRIL     loperamide 2 MG capsule  Commonly known as: IMODIUM     montelukast 10 MG tablet  Commonly known as: SINGULAIR     MULTIVITAMIN ADULT PO     nadolol 80 MG tablet  Commonly known as: CORGARD     omeprazole 20 MG delayed release capsule  Commonly known as: PRILOSEC     SYMBICORT IN                DISCONTINUED MEDICATIONS:  Discharge Medication List as of 2/8/2025  1:23 PM          I am the Primary Clinician of Record: OSMAR Sierra NP (electronically signed)    (Please note that parts of this dictation were completed with voice recognition software. Quite often unanticipated grammatical, syntax, homophones, and other interpretive errors are inadvertently transcribed by the computer software. Please disregards these errors. Please excuse any errors that have escaped final proofreading.)     Flako Foss APRN - NP  02/10/25 [id number]

## 2025-02-08 NOTE — ED TRIAGE NOTES
Reports fall last night. Reports struck head, rt upper arm and lt knee.  No LOC. Takes ASA daily. Denies dizziness, denies nausea.   Reports odd feeling in head    Reports she may have tripped on rug but was not dizzy or weak.

## 2025-03-20 ENCOUNTER — OFFICE VISIT (OUTPATIENT)
Age: 75
End: 2025-03-20
Payer: MEDICARE

## 2025-03-20 VITALS
HEART RATE: 68 BPM | WEIGHT: 135 LBS | HEIGHT: 63 IN | OXYGEN SATURATION: 99 % | DIASTOLIC BLOOD PRESSURE: 80 MMHG | BODY MASS INDEX: 23.92 KG/M2 | SYSTOLIC BLOOD PRESSURE: 136 MMHG

## 2025-03-20 DIAGNOSIS — N39.0 RECURRENT UTI: ICD-10-CM

## 2025-03-20 DIAGNOSIS — N30.01 ACUTE CYSTITIS WITH HEMATURIA: Primary | ICD-10-CM

## 2025-03-20 DIAGNOSIS — N95.2 POSTMENOPAUSAL ATROPHIC VAGINITIS: ICD-10-CM

## 2025-03-20 DIAGNOSIS — N39.41 URGE INCONTINENCE: ICD-10-CM

## 2025-03-20 DIAGNOSIS — R82.81 PYURIA: ICD-10-CM

## 2025-03-20 LAB
BILIRUBIN, URINE, POC: NEGATIVE
BLOOD URINE, POC: ABNORMAL
GLUCOSE URINE, POC: NEGATIVE
KETONES, URINE, POC: NEGATIVE
LEUKOCYTE ESTERASE, URINE, POC: NEGATIVE
NITRITE, URINE, POC: NEGATIVE
PH, URINE, POC: 7 (ref 4.6–8)
PROTEIN,URINE, POC: NEGATIVE
SPECIFIC GRAVITY, URINE, POC: 1.01 (ref 1–1.03)
URINALYSIS CLARITY, POC: CLEAR
URINALYSIS COLOR, POC: YELLOW
UROBILINOGEN, POC: ABNORMAL

## 2025-03-20 PROCEDURE — 81003 URINALYSIS AUTO W/O SCOPE: CPT | Performed by: UROLOGY

## 2025-03-20 PROCEDURE — 1090F PRES/ABSN URINE INCON ASSESS: CPT | Performed by: UROLOGY

## 2025-03-20 PROCEDURE — 1123F ACP DISCUSS/DSCN MKR DOCD: CPT | Performed by: UROLOGY

## 2025-03-20 PROCEDURE — G8427 DOCREV CUR MEDS BY ELIG CLIN: HCPCS | Performed by: UROLOGY

## 2025-03-20 PROCEDURE — 0509F URINE INCON PLAN DOCD: CPT | Performed by: UROLOGY

## 2025-03-20 PROCEDURE — G8400 PT W/DXA NO RESULTS DOC: HCPCS | Performed by: UROLOGY

## 2025-03-20 PROCEDURE — 3079F DIAST BP 80-89 MM HG: CPT | Performed by: UROLOGY

## 2025-03-20 PROCEDURE — 1036F TOBACCO NON-USER: CPT | Performed by: UROLOGY

## 2025-03-20 PROCEDURE — G8420 CALC BMI NORM PARAMETERS: HCPCS | Performed by: UROLOGY

## 2025-03-20 PROCEDURE — 99214 OFFICE O/P EST MOD 30 MIN: CPT | Performed by: UROLOGY

## 2025-03-20 PROCEDURE — 3075F SYST BP GE 130 - 139MM HG: CPT | Performed by: UROLOGY

## 2025-03-20 PROCEDURE — 3017F COLORECTAL CA SCREEN DOC REV: CPT | Performed by: UROLOGY

## 2025-03-20 NOTE — PROGRESS NOTES
HISTORY OF PRESENT ILLNESS  Regina Oleary is a 74 y.o. female   Patient returns today with atrophic vaginitis urethra-itis recurrent infections.  She is present on estradiol cream to help with atrophic urethra-itis help prevent UTIs.  The Keflex suppression appears to be working her urine today is clear  1. Acute cystitis with hematuria  Overview:  Urine culture;  1/2025 Pseudomonas aeruginosa Tx with cipro  8/14/23: E.coli - 25,000-50,000 colonies. Treated with Cipro.    4/24/24- Escherichia coli   Greater than 100,000 colony forming units per mL; Tx with keflex    She was put on Macrodantin suppression 4/2024    CT scan on 11/2022  -Punctate nonobstructing right renal stone. No hydronephrosis.   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  Ct scan 4/2024  Duplicated right renal collecting system with atrophy of the  upper pole again noted. Punctate nonobstructing right renal stone. No ureteral  stone or hydronephrosis.     She is on prophylactic Keflex  1/2025 urine culture Pseudomonas aeruginosa  tx with cipro  Orders:  -     AMB POC URINALYSIS DIP STICK AUTO W/O MICRO  -     estradiol (ESTRACE) 0.1 MG/GM vaginal cream; Place 1 g vaginally three times a week Do not use applicator use fingertip amount, pea size, directly to the opening of her urethra, Disp-42.5 g, R-5Normal  2. Pyuria  3. Urge incontinence  4. Recurrent UTI  Overview:  Urine culture;  1/2025 Pseudomonas aeruginosa Tx with cipro  8/14/23: E.coli - 25,000-50,000 colonies. Treated with Cipro.    4/24/24- Escherichia coli   Greater than 100,000 colony forming units per mL; Tx with keflex    She was put on Macrodantin suppression 4/2024    CT scan on 11/2022  -Punctate nonobstructing right renal stone. No hydronephrosis.   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  Ct scan 4/2024  Duplicated right renal collecting system with atrophy of the  upper pole again noted. Punctate nonobstructing

## 2025-03-20 NOTE — PROGRESS NOTES
1. Have you been to the ER, urgent care clinic since your last visit?  Hospitalized since your last visit?yes [facility] for fall and concussion.    2. Have you seen or consulted any other health care providers outside of the [facility] since your last visit?  Include any pap smears or colon screening. no

## 2025-03-31 RX ORDER — ESTRADIOL 0.1 MG/G
1 CREAM VAGINAL
Qty: 42.5 G | Refills: 5 | Status: SHIPPED | OUTPATIENT
Start: 2025-03-31

## 2025-04-18 DIAGNOSIS — E11.65 TYPE 2 DIABETES MELLITUS WITH HYPERGLYCEMIA, WITHOUT LONG-TERM CURRENT USE OF INSULIN (HCC): ICD-10-CM

## 2025-04-18 RX ORDER — SITAGLIPTIN AND METFORMIN HYDROCHLORIDE 1000; 50 MG/1; MG/1
1 TABLET, FILM COATED, EXTENDED RELEASE ORAL DAILY
Qty: 90 TABLET | Refills: 3 | Status: SHIPPED | OUTPATIENT
Start: 2025-04-18

## 2025-04-25 DIAGNOSIS — N39.0 RECURRENT UTI: ICD-10-CM

## 2025-04-30 RX ORDER — NITROFURANTOIN MACROCRYSTALS 100 MG/1
CAPSULE ORAL
Qty: 90 CAPSULE | Refills: 3 | OUTPATIENT
Start: 2025-04-30

## 2025-05-13 LAB
BUN SERPL-MCNC: 48 MG/DL (ref 8–27)
BUN/CREAT SERPL: 38 (ref 12–28)
CALCIUM SERPL-MCNC: 10.4 MG/DL (ref 8.7–10.3)
CHLORIDE SERPL-SCNC: 106 MMOL/L (ref 96–106)
CO2 SERPL-SCNC: 21 MMOL/L (ref 20–29)
CREAT SERPL-MCNC: 1.28 MG/DL (ref 0.57–1)
EGFRCR SERPLBLD CKD-EPI 2021: 45 ML/MIN/1.73
GLUCOSE SERPL-MCNC: 142 MG/DL (ref 70–99)
HBA1C MFR BLD: 6.6 % (ref 4.8–5.6)
Lab: NORMAL
POTASSIUM SERPL-SCNC: 5.2 MMOL/L (ref 3.5–5.2)
REPORT: NORMAL
SODIUM SERPL-SCNC: 141 MMOL/L (ref 134–144)

## 2025-05-16 ENCOUNTER — OFFICE VISIT (OUTPATIENT)
Age: 75
End: 2025-05-16
Payer: MEDICAID

## 2025-05-16 VITALS
RESPIRATION RATE: 18 BRPM | WEIGHT: 141 LBS | DIASTOLIC BLOOD PRESSURE: 52 MMHG | SYSTOLIC BLOOD PRESSURE: 115 MMHG | BODY MASS INDEX: 24.98 KG/M2 | OXYGEN SATURATION: 99 % | HEART RATE: 75 BPM | HEIGHT: 63 IN | TEMPERATURE: 98 F

## 2025-05-16 DIAGNOSIS — I10 ESSENTIAL HYPERTENSION: ICD-10-CM

## 2025-05-16 DIAGNOSIS — E11.65 TYPE 2 DIABETES MELLITUS WITH HYPERGLYCEMIA, WITHOUT LONG-TERM CURRENT USE OF INSULIN (HCC): ICD-10-CM

## 2025-05-16 DIAGNOSIS — E11.65 TYPE 2 DIABETES MELLITUS WITH HYPERGLYCEMIA, WITHOUT LONG-TERM CURRENT USE OF INSULIN (HCC): Primary | ICD-10-CM

## 2025-05-16 DIAGNOSIS — E03.9 PRIMARY HYPOTHYROIDISM: ICD-10-CM

## 2025-05-16 DIAGNOSIS — N17.9 ACUTE RENAL FAILURE, UNSPECIFIED ACUTE RENAL FAILURE TYPE: ICD-10-CM

## 2025-05-16 LAB
ANION GAP SERPL CALC-SCNC: 6 MMOL/L (ref 2–12)
BUN SERPL-MCNC: 38 MG/DL (ref 6–20)
BUN/CREAT SERPL: 37 (ref 12–20)
CALCIUM SERPL-MCNC: 10.2 MG/DL (ref 8.5–10.1)
CHLORIDE SERPL-SCNC: 106 MMOL/L (ref 97–108)
CO2 SERPL-SCNC: 26 MMOL/L (ref 21–32)
CREAT SERPL-MCNC: 1.04 MG/DL (ref 0.55–1.02)
GLUCOSE SERPL-MCNC: 190 MG/DL (ref 65–100)
POTASSIUM SERPL-SCNC: 4 MMOL/L (ref 3.5–5.1)
SODIUM SERPL-SCNC: 138 MMOL/L (ref 136–145)

## 2025-05-16 PROCEDURE — 1123F ACP DISCUSS/DSCN MKR DOCD: CPT | Performed by: INTERNAL MEDICINE

## 2025-05-16 PROCEDURE — 1036F TOBACCO NON-USER: CPT | Performed by: INTERNAL MEDICINE

## 2025-05-16 PROCEDURE — 2022F DILAT RTA XM EVC RTNOPTHY: CPT | Performed by: INTERNAL MEDICINE

## 2025-05-16 PROCEDURE — 99214 OFFICE O/P EST MOD 30 MIN: CPT | Performed by: INTERNAL MEDICINE

## 2025-05-16 PROCEDURE — G8427 DOCREV CUR MEDS BY ELIG CLIN: HCPCS | Performed by: INTERNAL MEDICINE

## 2025-05-16 PROCEDURE — G8419 CALC BMI OUT NRM PARAM NOF/U: HCPCS | Performed by: INTERNAL MEDICINE

## 2025-05-16 PROCEDURE — 3074F SYST BP LT 130 MM HG: CPT | Performed by: INTERNAL MEDICINE

## 2025-05-16 PROCEDURE — 1090F PRES/ABSN URINE INCON ASSESS: CPT | Performed by: INTERNAL MEDICINE

## 2025-05-16 PROCEDURE — 3044F HG A1C LEVEL LT 7.0%: CPT | Performed by: INTERNAL MEDICINE

## 2025-05-16 PROCEDURE — 3017F COLORECTAL CA SCREEN DOC REV: CPT | Performed by: INTERNAL MEDICINE

## 2025-05-16 PROCEDURE — 3078F DIAST BP <80 MM HG: CPT | Performed by: INTERNAL MEDICINE

## 2025-05-16 PROCEDURE — G2211 COMPLEX E/M VISIT ADD ON: HCPCS | Performed by: INTERNAL MEDICINE

## 2025-05-16 PROCEDURE — G8400 PT W/DXA NO RESULTS DOC: HCPCS | Performed by: INTERNAL MEDICINE

## 2025-05-16 RX ORDER — BACLOFEN 10 MG/1
10 TABLET ORAL 2 TIMES DAILY
COMMUNITY

## 2025-05-16 RX ORDER — NITROFURANTOIN MACROCRYSTALS 100 MG/1
100 CAPSULE ORAL NIGHTLY
COMMUNITY

## 2025-05-16 NOTE — PROGRESS NOTES
[facility] DIABETES AND ENDOCRINOLOGY                 Madelin Caban MD                Referred by: Lloyd Simon MD        The patient (or guardian, if applicable) and other individuals in attendance with the patient were advised that Artificial Intelligence will be utilized during this visit to record, process the conversation to generate a clinical note, and support improvement of the AI technology. The patient (or guardian, if applicable) and other individuals in attendance at the appointment consented to the use of AI, including the recording.      History of Present Illness  The patient presents for evaluation of diabetes mellitus,     Diabetes Mellitus  - Diabetes mellitus is well-managed with monotherapy,     Urinary Tract Infection (UTI)  - The patient experienced a UTI last month   - She has been on Bactrim since January 26, 2025.  - She reports adequate hydration.  - There has been no recent imaging with contrast dye.  - A change in renal function has been noted, potentially attributable to the UTI or the temporary administration of Celebrex for knee edema.    Multiple Sclerosis (MS)  - The patient had an MS exacerbation on Sunday, resulting in imbalance and lower back pain.  - She continues to perform exercises despite the pain.          She had Hashitoxicosis, was on MMI, now hypothyroid         Prior hx    Hyperthyroidism - on MMI, she is back on levothyroxine, says oncology started her back on levothyroxine.  TSH was slightly elevated while she was on methimazole and Dr. Flores started her back on levothyroxine   Advised patient to call me before she changes any of the thyroid medication in the future.       She has been gaining weight now, eating better, she was losing weight when she had hashitoxicosis.   She was on methimazole for some time which helped but she self discontinued now.   Cardiovascular risk factors: dyslipidemia, diabetes mellitus    She is off insulin      Her

## 2025-05-16 NOTE — PROGRESS NOTES
Regina Oleary is a 74 y.o. female here for   Chief Complaint   Patient presents with    Diabetes    Thyroid Problem       1. Have you been to the ER, urgent care clinic since your last visit?  Hospitalized since your last visit? -[facility] 2/8/25 in [facility]    2. Have you seen or consulted any other health care providers outside of the [facility] since your last visit?  Include any pap smears or colon screening.-Ortho

## 2025-06-05 ENCOUNTER — TRANSCRIBE ORDERS (OUTPATIENT)
Facility: HOSPITAL | Age: 75
End: 2025-06-05

## 2025-06-05 DIAGNOSIS — K76.0 METABOLIC DYSFUNCTION-ASSOCIATED STEATOTIC LIVER DISEASE (MASLD): ICD-10-CM

## 2025-06-05 DIAGNOSIS — K74.60 HEPATIC CIRRHOSIS, UNSPECIFIED HEPATIC CIRRHOSIS TYPE, UNSPECIFIED WHETHER ASCITES PRESENT (HCC): ICD-10-CM

## 2025-06-05 DIAGNOSIS — K52.9 CHRONIC DIARRHEA: Primary | ICD-10-CM

## 2025-06-16 ENCOUNTER — HOSPITAL ENCOUNTER (OUTPATIENT)
Facility: HOSPITAL | Age: 75
Discharge: HOME | End: 2025-06-19
Attending: INTERNAL MEDICINE
Payer: MEDICARE

## 2025-06-16 DIAGNOSIS — K74.60 HEPATIC CIRRHOSIS, UNSPECIFIED HEPATIC CIRRHOSIS TYPE, UNSPECIFIED WHETHER ASCITES PRESENT (HCC): ICD-10-CM

## 2025-06-16 DIAGNOSIS — K76.0 METABOLIC DYSFUNCTION-ASSOCIATED STEATOTIC LIVER DISEASE (MASLD): ICD-10-CM

## 2025-06-16 DIAGNOSIS — K52.9 CHRONIC DIARRHEA: ICD-10-CM

## 2025-06-16 PROCEDURE — 74181 MRI ABDOMEN W/O CONTRAST: CPT

## 2025-09-03 LAB
ALBUMIN/CREAT UR: 39 MG/G CREAT (ref 0–29)
BUN SERPL-MCNC: 33 MG/DL (ref 8–27)
BUN/CREAT SERPL: 35 (ref 12–28)
CALCIUM SERPL-MCNC: 9.7 MG/DL (ref 8.7–10.3)
CHLORIDE SERPL-SCNC: 101 MMOL/L (ref 96–106)
CO2 SERPL-SCNC: 28 MMOL/L (ref 20–29)
CREAT SERPL-MCNC: 0.95 MG/DL (ref 0.57–1)
CREAT UR-MCNC: 69.9 MG/DL
EGFRCR SERPLBLD CKD-EPI 2021: 64 ML/MIN/1.73
GLUCOSE SERPL-MCNC: 184 MG/DL (ref 70–99)
HBA1C MFR BLD: 6.3 % (ref 4.8–5.6)
LDLC SERPL DIRECT ASSAY-MCNC: 90 MG/DL (ref 0–99)
MICROALBUMIN UR-MCNC: 27.5 UG/ML
POTASSIUM SERPL-SCNC: 4.7 MMOL/L (ref 3.5–5.2)
SODIUM SERPL-SCNC: 138 MMOL/L (ref 134–144)

## (undated) DEVICE — Device: Brand: JELCO

## (undated) DEVICE — SOL SCRUB DYNA HEX 4% 4OZ --

## (undated) DEVICE — SOLUTION IRRIG 500ML STRL H2O NONPYROGENIC

## (undated) DEVICE — GOWN,SURGICAL,AURORA,SLEEVE: Brand: MEDLINE

## (undated) DEVICE — Device: Brand: INJETAK ADJUSTABLE TIP NEEDLE 35CM

## (undated) DEVICE — TUBING, SUCTION, 1/4" X 12', STRAIGHT: Brand: MEDLINE

## (undated) DEVICE — SOLUTION IRRIG 3000ML 0.9% SOD CHL USP UROMATIC PLAS CONT

## (undated) DEVICE — GARMENT,MEDLINE,DVT,INT,CALF,MED, GEN2: Brand: MEDLINE

## (undated) DEVICE — BLUNTFILL: Brand: MONOJECT

## (undated) DEVICE — VINYL ACETATE UROLOGICAL DRAINAGE BAG FOR GE/OEC SYSTEMS W/ 8MM PLUG - NON-STERILE: Brand: CUSTOM MEDICAL SPECIALTIES, INC.

## (undated) DEVICE — PREP PAD BNS: Brand: CONVERTORS

## (undated) DEVICE — GLOVE ORANGE PI 7 1/2   MSG9075

## (undated) DEVICE — KIT ENDOSCOPIC  PROC VIA

## (undated) DEVICE — BITE BLOCK ENDOSCP AD 60 FR W/ ADJ STRP PLAS GRN BLOX

## (undated) DEVICE — CYSTO PACK: Brand: MEDLINE INDUSTRIES, INC.

## (undated) DEVICE — MASK O2 MED AD 7 FT 3 IN 1 W/ STD CONN LTX

## (undated) DEVICE — LINE SAMPLING AD NSL O2 TBNG MICROSTREAM ADV FILTERLINE MVAO

## (undated) DEVICE — MASK ANES INF SZ 2 PREM TAIL VLV INFL PRT UNSCENTED SGL PT

## (undated) DEVICE — CORD LAPAROSCOPIC MONOPOLAR